# Patient Record
Sex: FEMALE | Race: WHITE | NOT HISPANIC OR LATINO | Employment: UNEMPLOYED | URBAN - METROPOLITAN AREA
[De-identification: names, ages, dates, MRNs, and addresses within clinical notes are randomized per-mention and may not be internally consistent; named-entity substitution may affect disease eponyms.]

---

## 2017-01-03 ENCOUNTER — TELEPHONE (OUTPATIENT)
Dept: OBSTETRICS AND GYNECOLOGY | Facility: CLINIC | Age: 32
End: 2017-01-03

## 2017-01-03 ENCOUNTER — ROUTINE PRENATAL (OUTPATIENT)
Dept: OBSTETRICS AND GYNECOLOGY | Facility: CLINIC | Age: 32
End: 2017-01-03
Payer: MEDICAID

## 2017-01-03 VITALS
BODY MASS INDEX: 23.12 KG/M2 | DIASTOLIC BLOOD PRESSURE: 64 MMHG | SYSTOLIC BLOOD PRESSURE: 102 MMHG | WEIGHT: 118.38 LBS

## 2017-01-03 DIAGNOSIS — Z36.89 ENCOUNTER FOR FETAL ANATOMIC SURVEY: ICD-10-CM

## 2017-01-03 DIAGNOSIS — O09.292 HISTORY OF INCOMPETENT CERVIX, CURRENTLY PREGNANT IN SECOND TRIMESTER: ICD-10-CM

## 2017-01-03 DIAGNOSIS — O34.32 CERVICAL CERCLAGE SUTURE PRESENT IN SECOND TRIMESTER: ICD-10-CM

## 2017-01-03 DIAGNOSIS — Z3A.18 18 WEEKS GESTATION OF PREGNANCY: Primary | ICD-10-CM

## 2017-01-03 PROCEDURE — 99213 OFFICE O/P EST LOW 20 MIN: CPT | Mod: TH,S$PBB,, | Performed by: OBSTETRICS & GYNECOLOGY

## 2017-01-03 PROCEDURE — 96372 THER/PROPH/DIAG INJ SC/IM: CPT | Mod: PBBFAC,PN

## 2017-01-03 PROCEDURE — 99212 OFFICE O/P EST SF 10 MIN: CPT | Mod: PBBFAC,PN,25 | Performed by: OBSTETRICS & GYNECOLOGY

## 2017-01-03 PROCEDURE — 99999 PR PBB SHADOW E&M-EST. PATIENT-LVL II: CPT | Mod: PBBFAC,,, | Performed by: OBSTETRICS & GYNECOLOGY

## 2017-01-03 RX ORDER — HYDROXYPROGESTERONE CAPROATE 250 MG/ML
250 INJECTION INTRAMUSCULAR
Status: DISCONTINUED | OUTPATIENT
Start: 2017-01-03 | End: 2017-01-10

## 2017-01-03 RX ORDER — HYDROXYPROGESTERONE CAPROATE 250 MG/ML
250 INJECTION INTRAMUSCULAR
Status: DISCONTINUED | OUTPATIENT
Start: 2017-01-04 | End: 2017-01-03

## 2017-01-03 RX ADMIN — HYDROXYPROGESTERONE CAPROATE 250 MG: 250 INJECTION INTRAMUSCULAR at 04:01

## 2017-01-03 NOTE — PROGRESS NOTES
Patient states feeling well.  7# weight gain noted.  No cramping, spotting or lof.  Continue weekly Neisha 250 mg IM.  For injection today and nurse visit next week.  RTC 2 wks for OV and anatomical survey.  Aware Quad screen was negative.

## 2017-01-03 NOTE — MR AVS SNAPSHOT
[facility]  [address]  [place] [address]  Phone: [phone]                  Ebony Rodriguez   1/3/2017 3:00 PM   Routine Prenatal    Description:  Female : 1985   Provider:  Radha Romero CNM   Department:  [facility]           Reason for Visit     Routine Prenatal Visit           Diagnoses this Visit        Comments    18 weeks gestation of pregnancy    -  Primary     Cervical cerclage suture present in second trimester         History of incompetent cervix, currently pregnant in second trimester         Encounter for fetal anatomic survey                To Do List           Future Appointments        Provider Department Dept Phone    1/10/2017 2:30 PM OB GYN NURSE, [facility] Gynecology [phone]    2017 2:30 PM ULTRA SOUND, OB-GYN-[facility] [phone]    2017 3:00 PM Radha Romero CNM [facility] Gynecology [phone]      Goals (5 Years of Data)     None      Follow-Up and Disposition     Return in about 2 weeks (around 2017).      [facility]     [facility] On Call Nurse [facility] -  Assistance  Registered nurses in the [facility] On Call Center provide clinical advisement, health education, appointment booking, and other advisory services.  Call for this free service at [phone].             Medications           Message regarding Medications     Verify the changes and/or additions to your medication regime listed below are the same as discussed with your clinician today.  If any of these changes or additions are incorrect, please notify your healthcare provider.             Verify that the below list of medications is an accurate representation of the medications you are currently taking.  If none reported, the list may be blank. If incorrect, please contact your healthcare provider. Carry this list with you in case of  emergency.           Current Medications     PRENATAL VIT/IRON FUMARATE/FA (PRENATAL 1+1 ORAL) Take by mouth.           Clinical Reference Information           Prenatal Vitals     Enc. Date GA Prenatal Vitals Prenatal Pulse Pain Level Urine Albumin/Glucose Edema Presentation Dilation/Effacement/Station    1/3/17 18w5d 102/64 / 53.7 kg (118 lb 6.2 oz) 18 cm / 145 / Present  0 Negative / Negative       16 16w5d 100/42 (A) / 50.7 kg (111 lb 12.4 oz)  / 133 u/s  / Absent  0        16 15w5d 108/72 / 51.5 kg (113 lb 8.6 oz)           16 14w5d 97/66 / 50.8 kg (111 lb 15.9 oz)  / 147 / Absent  0 Negative / Negative       16 14w0d 100/60 / 50.8 kg (111 lb 15.9 oz)  / 144 / Absent  0  None / None / None / No      16 13w5d 112/72 / 50.7 kg (111 lb 12.4 oz)  / 154 / Absent  0 Negative / Negative       16 13w1d 98/68 / 51 kg (112 lb 7 oz) 13 cm / 150 / Absent  0 2+ / Negative None / None / None / No         TW.7 kg (5 lb 15.2 oz)   Pregravid weight: 51 kg (112 lb 7 oz)   Number of fetuses: 1   Height: 5' (1.524 m)   BMI: 22.0       Vital Signs - Last Recorded  Most recent update: 1/3/2017  3:35 PM by Paula Jenkins MA    BP Wt LMP BMI       102/64 53.7 kg (118 lb 6.2 oz) 2016 23.12 kg/m2       Allergies as of 1/3/2017     No Known Allergies      Immunizations Administered on Date of Encounter - 1/3/2017     None      Orders Placed During Today's Visit     Future Labs/Procedures Expected by Expires    US OB/GYN Procedure ([facility])  As directed 1/3/2018      [facility] Sign-Up     Activating your [facility] account is as easy as 1-2-3!     1) Visit [email or web address], select Sign Up Now, enter this activation code and your date of birth, then select Next.  [email or web address]  Expires: 2017  4:37 PM      2) Create a username and password to use when you visit [facility] in the future and select a security question in case you lose your password and select Next.    3) Enter your e-mail  address and click Sign Up!    Additional Information  If you have questions, please e-mail [email or web address] or call [phone] to talk to our [facility] staff. Remember, [facility] is NOT to be used for urgent needs. For medical emergencies, dial 911.

## 2017-01-08 ENCOUNTER — HOSPITAL ENCOUNTER (EMERGENCY)
Facility: HOSPITAL | Age: 32
Discharge: HOME | End: 2017-01-08
Payer: MEDICAID

## 2017-01-08 VITALS
HEIGHT: 60 IN | DIASTOLIC BLOOD PRESSURE: 65 MMHG | TEMPERATURE: 98 F | BODY MASS INDEX: 22.38 KG/M2 | HEART RATE: 66 BPM | RESPIRATION RATE: 16 BRPM | SYSTOLIC BLOOD PRESSURE: 95 MMHG | WEIGHT: 114 LBS | OXYGEN SATURATION: 100 %

## 2017-01-08 DIAGNOSIS — O21.9 NAUSEA/VOMITING IN PREGNANCY: Primary | ICD-10-CM

## 2017-01-08 DIAGNOSIS — R20.2 PARESTHESIAS: ICD-10-CM

## 2017-01-08 DIAGNOSIS — N30.00 ACUTE CYSTITIS WITHOUT HEMATURIA: ICD-10-CM

## 2017-01-08 LAB
ALBUMIN SERPL BCP-MCNC: 3.3 G/DL
ALP SERPL-CCNC: 38 U/L
ALT SERPL W/O P-5'-P-CCNC: 5 U/L
ANION GAP SERPL CALC-SCNC: 11 MMOL/L
AST SERPL-CCNC: 21 U/L
BACTERIA #/AREA URNS HPF: ABNORMAL /HPF
BASOPHILS # BLD AUTO: 0 K/UL
BASOPHILS NFR BLD: 0 %
BILIRUB SERPL-MCNC: 0.6 MG/DL
BILIRUB UR QL STRIP: NEGATIVE
BUN SERPL-MCNC: 6 MG/DL
CALCIUM SERPL-MCNC: 8.6 MG/DL
CHLORIDE SERPL-SCNC: 104 MMOL/L
CLARITY UR: CLEAR
CO2 SERPL-SCNC: 21 MMOL/L
COLOR UR: YELLOW
CREAT SERPL-MCNC: 0.6 MG/DL
DIFFERENTIAL METHOD: ABNORMAL
EOSINOPHIL # BLD AUTO: 0 K/UL
EOSINOPHIL NFR BLD: 0.5 %
ERYTHROCYTE [DISTWIDTH] IN BLOOD BY AUTOMATED COUNT: 14 %
EST. GFR  (AFRICAN AMERICAN): >60 ML/MIN/1.73 M^2
EST. GFR  (NON AFRICAN AMERICAN): >60 ML/MIN/1.73 M^2
GLUCOSE SERPL-MCNC: 96 MG/DL
GLUCOSE UR QL STRIP: NEGATIVE
HCT VFR BLD AUTO: 35.2 %
HGB BLD-MCNC: 12.8 G/DL
HGB UR QL STRIP: NEGATIVE
KETONES UR QL STRIP: NEGATIVE
LEUKOCYTE ESTERASE UR QL STRIP: ABNORMAL
LYMPHOCYTES # BLD AUTO: 0.8 K/UL
LYMPHOCYTES NFR BLD: 12.7 %
MCH RBC QN AUTO: 32.7 PG
MCHC RBC AUTO-ENTMCNC: 36.4 %
MCV RBC AUTO: 90 FL
MICROSCOPIC COMMENT: ABNORMAL
MONOCYTES # BLD AUTO: 0.3 K/UL
MONOCYTES NFR BLD: 4.8 %
NEUTROPHILS # BLD AUTO: 5 K/UL
NEUTROPHILS NFR BLD: 82.3 %
NITRITE UR QL STRIP: NEGATIVE
PH UR STRIP: 6 [PH] (ref 5–8)
PLATELET # BLD AUTO: 135 K/UL
PMV BLD AUTO: 9.6 FL
POTASSIUM SERPL-SCNC: 4.2 MMOL/L
PROT SERPL-MCNC: 6.5 G/DL
PROT UR QL STRIP: NEGATIVE
RBC # BLD AUTO: 3.92 M/UL
RBC #/AREA URNS HPF: 0 /HPF (ref 0–4)
SODIUM SERPL-SCNC: 136 MMOL/L
SP GR UR STRIP: 1.02 (ref 1–1.03)
SQUAMOUS #/AREA URNS HPF: 20 /HPF
URN SPEC COLLECT METH UR: ABNORMAL
UROBILINOGEN UR STRIP-ACNC: ABNORMAL EU/DL
WBC # BLD AUTO: 6.06 K/UL
WBC #/AREA URNS HPF: 20 /HPF (ref 0–5)

## 2017-01-08 PROCEDURE — 96361 HYDRATE IV INFUSION ADD-ON: CPT

## 2017-01-08 PROCEDURE — 63600175 PHARM REV CODE 636 W HCPCS: Performed by: NURSE PRACTITIONER

## 2017-01-08 PROCEDURE — 96374 THER/PROPH/DIAG INJ IV PUSH: CPT

## 2017-01-08 PROCEDURE — 81000 URINALYSIS NONAUTO W/SCOPE: CPT

## 2017-01-08 PROCEDURE — 87088 URINE BACTERIA CULTURE: CPT

## 2017-01-08 PROCEDURE — 85025 COMPLETE CBC W/AUTO DIFF WBC: CPT

## 2017-01-08 PROCEDURE — 99283 EMERGENCY DEPT VISIT LOW MDM: CPT | Mod: 25

## 2017-01-08 PROCEDURE — 87186 SC STD MICRODIL/AGAR DIL: CPT

## 2017-01-08 PROCEDURE — 80053 COMPREHEN METABOLIC PANEL: CPT

## 2017-01-08 PROCEDURE — 25000003 PHARM REV CODE 250: Performed by: NURSE PRACTITIONER

## 2017-01-08 PROCEDURE — 87077 CULTURE AEROBIC IDENTIFY: CPT

## 2017-01-08 PROCEDURE — 87086 URINE CULTURE/COLONY COUNT: CPT

## 2017-01-08 RX ORDER — METOCLOPRAMIDE HYDROCHLORIDE 5 MG/ML
10 INJECTION INTRAMUSCULAR; INTRAVENOUS
Status: COMPLETED | OUTPATIENT
Start: 2017-01-08 | End: 2017-01-08

## 2017-01-08 RX ORDER — NITROFURANTOIN 25; 75 MG/1; MG/1
100 CAPSULE ORAL 2 TIMES DAILY
Qty: 14 CAPSULE | Refills: 0 | Status: SHIPPED | OUTPATIENT
Start: 2017-01-08 | End: 2017-01-15

## 2017-01-08 RX ORDER — DOXYLAMINE SUCCINATE AND PYRIDOXINE HYDROCHLORIDE, DELAYED RELEASE TABLETS 10 MG/10 MG 10; 10 MG/1; MG/1
TABLET, DELAYED RELEASE ORAL
Qty: 20 TABLET | Refills: 0 | Status: ON HOLD | OUTPATIENT
Start: 2017-01-08 | End: 2017-05-10 | Stop reason: HOSPADM

## 2017-01-08 RX ADMIN — METOCLOPRAMIDE 10 MG: 5 INJECTION, SOLUTION INTRAMUSCULAR; INTRAVENOUS at 12:01

## 2017-01-08 RX ADMIN — SODIUM CHLORIDE 1000 ML: 0.9 INJECTION, SOLUTION INTRAVENOUS at 12:01

## 2017-01-08 NOTE — ED PROVIDER NOTES
Encounter Date: 2017       History     Chief Complaint   Patient presents with    Nausea     Patient c/o nausea and lightheaded, patient states she is 19 weeeks      Review of patient's allergies indicates:  No Known Allergies  HPI Comments:  19w 0d 31 year old female presents with complaint of nausea/ vomiting x 2 yesterday associated with numbness, paresthesias and fingers that began gradually yesterday. Symptoms are intermittent and moderate. Pt reports she is nervous. No pain to describe. No radiation. Pt reported episodic lightheadedness earlier today but has no symptoms now. No fever. No pelvic pain, dysuria, urinary frequency, no abdominal pain, no vaginal dc or bleeding.    The history is provided by the patient.     Past Medical History   Diagnosis Date    Anemia     H/O cervical cerclage, currently pregnant      No past medical history pertinent negatives.  Past Surgical History   Procedure Laterality Date    Cervical cerclage       Family History   Problem Relation Age of Onset    Heart failure Father     Diabetes Mother      Social History   Substance Use Topics    Smoking status: Never Smoker    Smokeless tobacco: None    Alcohol use No     Review of Systems   Constitutional: Negative.  Negative for chills and fever.   HENT: Negative.  Negative for congestion and rhinorrhea.    Eyes: Negative.    Respiratory: Negative.  Negative for chest tightness, shortness of breath and wheezing.    Cardiovascular: Negative.  Negative for chest pain and palpitations.   Gastrointestinal: Positive for nausea and vomiting. Negative for abdominal pain.   Endocrine: Negative.    Genitourinary: Negative.    Musculoskeletal: Negative.  Negative for back pain and myalgias.   Skin: Negative.  Negative for pallor and rash.   Allergic/Immunologic: Negative.    Neurological: Negative.  Negative for weakness and headaches.   Hematological: Negative.    Psychiatric/Behavioral: The patient is nervous/anxious.     All other systems reviewed and are negative.      Physical Exam   Initial Vitals   BP Pulse Resp Temp SpO2   01/08/17 1106 01/08/17 1106 01/08/17 1106 01/08/17 1106 01/08/17 1106   109/68 90 18 97.6 °F (36.4 °C) 100 %     Physical Exam    Nursing note and vitals reviewed.  Constitutional: She appears well-developed and well-nourished.   HENT:   Head: Normocephalic and atraumatic.   Nose: Nose normal.   Mouth/Throat: Oropharynx is clear and moist.   Eyes: Conjunctivae and EOM are normal.   Neck: Normal range of motion. Neck supple.   Cardiovascular: Normal rate, regular rhythm and normal heart sounds.   No murmur heard.  Pulmonary/Chest: Breath sounds normal. No respiratory distress. She has no wheezes. She has no rhonchi. She has no rales. She exhibits no tenderness.   Abdominal: Soft. Bowel sounds are normal. She exhibits no distension. There is no tenderness. There is no rebound and no guarding.   Musculoskeletal: Normal range of motion.   Neurological: She is alert and oriented to person, place, and time. She has normal strength. No sensory deficit.   Skin: Skin is warm and dry.   Psychiatric: She has a normal mood and affect. Thought content normal.         ED Course   Procedures  Labs Reviewed   URINALYSIS - Abnormal; Notable for the following:        Result Value    Leukocytes, UA Trace (*)     All other components within normal limits   CULTURE, URINE   CBC W/ AUTO DIFFERENTIAL   COMPREHENSIVE METABOLIC PANEL   URINALYSIS MICROSCOPIC               Results for orders placed or performed during the hospital encounter of 01/08/17   Urinalysis   Result Value Ref Range    Specimen UA Urine, Clean Catch     Color, UA Yellow Yellow, Straw, Ester    Appearance, UA Clear Clear    Specific Gravity, UA 1.025 1.005 - 1.030    Protein, UA Negative Negative    Glucose, UA Negative Negative    Ketones, UA Negative Negative    Bilirubin (UA) Negative Negative    Occult Blood UA Negative Negative    Nitrite, UA Negative  Negative    Leukocytes, UA Trace (A) Negative         The patient is resting comfortably and is in no acute distress. Pt reports relief and no symptoms of n/v, paresthesias,lightheadedness, or abdominal pain. I have informed pt that a urine culture will be completed to determine the results of the urinalysis. There was high amount of sq cells noted in addition to bacteria, and wbc on hpf. I will treat with abx while culture is being performed. she states that her symptoms have improved after treatment within ER. Discussed test results, shared treatment plan, specific conditions for return, and importance of follow up with the appropriate physician with patient. she understands and agrees with the plan as discussed. Answered her questions at this time. she has remained hemodynamically stable throughout the ED course and is appropriate for discharge home.   Medical Decision Making:   Clinical Tests:   Lab Tests: Ordered and Reviewed  The following lab test(s) were unremarkable: CBC, CMP and Urinalysis       <> Summary of Lab: Unremarkable.   Medical Tests: Ordered and Reviewed      I discussed with patient and/or family/caretaker that evaluation in the ED does not suggest any emergent or life threatening medical conditions requiring immediate intervention beyond what was provided in the ED, and I believe patient is safe for discharge.  Regardless, an unremarkable evaluation in the ED does not preclude the development or presence of a serious of life threatening condition. As such, patient was instructed to return immediately for any worsening or change in current symptoms.               ED Course     Clinical Impression:   The primary encounter diagnosis was Nausea/vomiting in pregnancy. Diagnoses of Paresthesias and Acute cystitis without hematuria were also pertinent to this visit.          Brennon Pryor Jr., [facility]  01/08/17 1697

## 2017-01-08 NOTE — ED NOTES
Pt sitting in chair in NAD on cell phone, awaiting fluids to complete infusion, asking to go home, will evaluated when fluids complete. No current needs at this time

## 2017-01-08 NOTE — ED AVS SNAPSHOT
[facility]  [address] [address]               Ebony Rodriguez   2017 11:10 AM   ED    Description:  Female : 1985   Department:  [facility]           Your Care was Coordinated By:     Provider Role From To    VIPIN Hazel Jr. Nurse Practitioner 17 1110 --      Reason for Visit     Nausea           Diagnoses this Visit        Comments    Nausea/vomiting in pregnancy    -  Primary     Paresthesias         Acute cystitis without hematuria           ED Disposition     None           To Do List           Follow-up Information     [facility]. Schedule an appointment as soon as possible for a visit in 2 days.    Specialty:  Obstetrics and Gynecology    Why:  If symptoms worsen return to ED    Contact information:    [address] [address] [phone]    Additional information:    (off [address]) 4th floor, Please check in for your appointment in the Women's Services Department located through the doorway to the left of the elevators.       These Medications        Disp Refills Start End    nitrofurantoin, macrocrystal-monohydrate, (MACROBID) 100 MG capsule 14 capsule 0 2017 1/15/2017    Take 1 capsule (100 mg total) by mouth 2 (two) times daily. - Oral    Pharmacy: [facility] [address] [place] [address] MAIN  AT [facility] [address] Ph #: [phone]       doxylamine-pyridoxine (DICLEGIS) 10-10 mg TbEC 20 tablet 0 2017     Take as directed.    Pharmacy: [facility] [address] [place] [address] MAIN  AT [facility] [address] Ph #: [phone]         [facility]     [facility] Care [facility] -  Assistance  Registered nurses in the [facility] Center provide clinical advisement, health education, appointment booking, and other advisory services.  Call for this free service at [phone].             Medications            Message regarding Medications     Verify the changes and/or additions to your medication regime listed below are the same as discussed with your clinician today.  If any of these changes or additions are incorrect, please notify your healthcare provider.        START taking these NEW medications        Refills    nitrofurantoin, macrocrystal-monohydrate, (MACROBID) 100 MG capsule 0    Sig: Take 1 capsule (100 mg total) by mouth 2 (two) times daily.    Class: Print    Route: Oral    doxylamine-pyridoxine (DICLEGIS) 10-10 mg TbEC 0    Sig: Take as directed.    Class: Print      These medications were administered today        Dose Freq    metoclopramide HCl injection 10 mg 10 mg ED 1 Time    Sig: Inject 2 mLs (10 mg total) into the vein ED 1 Time.    Class: Normal    Route: Intravenous    sodium chloride 0.9% bolus 1,000 mL 1,000 mL ED 1 Time    Sig: Inject 1,000 mLs into the vein ED 1 Time.    Class: Normal    Route: Intravenous           Verify that the below list of medications is an accurate representation of the medications you are currently taking.  If none reported, the list may be blank. If incorrect, please contact your healthcare provider. Carry this list with you in case of emergency.           Current Medications     doxylamine-pyridoxine (DICLEGIS) 10-10 mg TbEC Take as directed.    JUNIE Oil 250 mg Inject 1 mL (250 mg total) into the muscle every 7 days.    JUNIE Oil 250 mg Inject 1 mL (250 mg total) into the muscle every 7 days.    JUNIE Oil 250 mg Inject 1 mL (250 mg total) into the muscle every 7 days.    nitrofurantoin, macrocrystal-monohydrate, (MACROBID) 100 MG capsule Take 1 capsule (100 mg total) by mouth 2 (two) times daily.    PRENATAL VIT/IRON FUMARATE/FA (PRENATAL 1+1 ORAL) Take by mouth.           Clinical Reference Information           Your Vitals Were     BP Pulse Temp Resp Height Weight    95/65 (BP Location: Left arm, Patient Position: Sitting, BP Method: Automatic) 66 97.6 °F  (36.4 °C) (Oral) 16 5' (1.524 m) 51.7 kg (114 lb)    Last Period SpO2 BMI          09/08/2016 100% 22.26 kg/m2        Allergies as of 1/8/2017     No Known Allergies      Immunizations Administered on Date of Encounter - 1/8/2017     None      ED Micro, Lab, POCT     Start Ordered       Status Ordering Provider    01/08/17 1422 01/08/17 1421  Urine culture **CANNOT BE ORDERED STAT**  Add-on      Completed     01/08/17 1134 01/08/17 1133  CBC auto differential  STAT      Collected     01/08/17 1134 01/08/17 1133  Comprehensive metabolic panel  STAT      Collected     01/08/17 1134 01/08/17 1133  Urinalysis  STAT      Collected       ED Imaging Orders     None      Discharge References/Attachments     PARAESTHESIAS (ENGLISH)      Your Scheduled Appointments     Facundo 10, 2017  2:30 PM CST   Nurse Visit with OB GYN NURSE, [facility] ([place])    [address] [place] [id number] [phone]            Jan 17, 2017  2:30 PM CST   Ultrasound with ULTRA SOUND, OB-GYN-[facility]ne[facility] ([place])    [address] [place] [address] [phone]            Jan 17, 2017  3:00 PM CST   Routine Prenatal Visit with Radha Romero CNM   [facility] ([place])    [address] [place] [address]   [phone]              [facility] Sign-Up     Activating your [facility] account is as easy as 1-2-3!     1) Visit [email or web address], select Sign Up Now, enter this activation code and your date of birth, then select Next.  [email or web address]  Expires: 1/27/2017  4:37 PM      2) Create a username and password to use when you visit [facility] in the future and select a security question in case you lose your password and select Next.    3) Enter your e-mail address and click Sign Up!    Additional Information  If you have questions, please e-mail [email or web address] or call [phone] to talk to our [facility]  staff. Remember, [facility] is NOT to be used for urgent needs. For medical emergencies, dial 911.          [facility] complies with applicable Federal civil rights laws and does not discriminate on the basis of race, color, national origin, age, disability, or sex.        Language Assistance Services     ATTENTION: Language assistance services are available, free of charge. Please call [phone].      ATENCIÓN: Si habla español, tiene a diallo disposición servicios gratuitos de asistencia lingüística. Llame al [phone].     CHÚ Ý: N?u b?n nói Ti?ng Vi?t, có các d?ch v? h? tr? ngôn ng? mi?n phí dành cho b?n. G?i s? [phone].

## 2017-01-10 ENCOUNTER — CLINICAL SUPPORT (OUTPATIENT)
Dept: OBSTETRICS AND GYNECOLOGY | Facility: CLINIC | Age: 32
End: 2017-01-10
Payer: MEDICAID

## 2017-01-10 DIAGNOSIS — O34.32 CERVICAL CERCLAGE SUTURE PRESENT IN SECOND TRIMESTER: ICD-10-CM

## 2017-01-10 PROCEDURE — 96372 THER/PROPH/DIAG INJ SC/IM: CPT | Mod: PBBFAC,PN

## 2017-01-10 RX ORDER — HYDROXYPROGESTERONE CAPROATE 250 MG/ML
250 INJECTION INTRAMUSCULAR
Status: DISCONTINUED | OUTPATIENT
Start: 2017-01-10 | End: 2017-03-07

## 2017-01-10 RX ADMIN — HYDROXYPROGESTERONE CAPROATE 250 MG: 250 INJECTION INTRAMUSCULAR at 03:01

## 2017-01-10 NOTE — PROGRESS NOTES
ID pt with name and , verified allergies.  Per MAR gave josh oil IM to left ventrogluteal. Pt tolerated well. Advised to remain in clinic 15 minutes,  Next dose due in 7 days, scheduled accordingly.  JACQUIE Moeller

## 2017-01-11 LAB — BACTERIA UR CULT: NORMAL

## 2017-01-12 ENCOUNTER — TELEPHONE (OUTPATIENT)
Dept: EMERGENCY MEDICINE | Facility: HOSPITAL | Age: 32
End: 2017-01-12

## 2017-01-12 NOTE — TELEPHONE ENCOUNTER
----- Message from Robbie Huston MD sent at 1/11/2017 11:31 AM CST -----  Patient with positive urine culture, please call in bactrim DS bid for 7 days.  Thanks

## 2017-01-17 ENCOUNTER — PROCEDURE VISIT (OUTPATIENT)
Dept: OBSTETRICS AND GYNECOLOGY | Facility: CLINIC | Age: 32
End: 2017-01-17
Payer: MEDICAID

## 2017-01-17 ENCOUNTER — ROUTINE PRENATAL (OUTPATIENT)
Dept: OBSTETRICS AND GYNECOLOGY | Facility: CLINIC | Age: 32
End: 2017-01-17
Payer: MEDICAID

## 2017-01-17 VITALS
SYSTOLIC BLOOD PRESSURE: 102 MMHG | WEIGHT: 117.06 LBS | DIASTOLIC BLOOD PRESSURE: 70 MMHG | BODY MASS INDEX: 22.86 KG/M2

## 2017-01-17 DIAGNOSIS — Z36.89 ENCOUNTER FOR FETAL ANATOMIC SURVEY: ICD-10-CM

## 2017-01-17 DIAGNOSIS — Z3A.20 20 WEEKS GESTATION OF PREGNANCY: Primary | ICD-10-CM

## 2017-01-17 DIAGNOSIS — N88.3 INCOMPETENT CERVIX: ICD-10-CM

## 2017-01-17 DIAGNOSIS — O34.32 CERVICAL CERCLAGE SUTURE PRESENT IN SECOND TRIMESTER: ICD-10-CM

## 2017-01-17 PROCEDURE — 99212 OFFICE O/P EST SF 10 MIN: CPT | Mod: PBBFAC,PN | Performed by: OBSTETRICS & GYNECOLOGY

## 2017-01-17 PROCEDURE — 99213 OFFICE O/P EST LOW 20 MIN: CPT | Mod: TH,S$PBB,, | Performed by: OBSTETRICS & GYNECOLOGY

## 2017-01-17 PROCEDURE — 96372 THER/PROPH/DIAG INJ SC/IM: CPT | Mod: PBBFAC,PN

## 2017-01-17 PROCEDURE — 99999 PR PBB SHADOW E&M-EST. PATIENT-LVL II: CPT | Mod: PBBFAC,,, | Performed by: OBSTETRICS & GYNECOLOGY

## 2017-01-17 PROCEDURE — 76805 OB US >/= 14 WKS SNGL FETUS: CPT | Mod: 26,S$PBB,, | Performed by: OBSTETRICS & GYNECOLOGY

## 2017-01-17 RX ORDER — HYDROXYPROGESTERONE CAPROATE 250 MG/ML
250 INJECTION INTRAMUSCULAR
Status: DISCONTINUED | OUTPATIENT
Start: 2017-01-18 | End: 2017-03-07

## 2017-01-17 RX ADMIN — HYDROXYPROGESTERONE CAPROATE 250 MG: 250 INJECTION INTRAMUSCULAR at 04:01

## 2017-01-17 NOTE — PROGRESS NOTES
Anatomy scan today with good growth, 38%tile, cephalic, kenia normal, 3V, post. Placenta. Male.  Sub-opt brain. Rpt 4 wks.  Continue weekly Neisha 250 mg/oil IM.  Denies any cramping, bleeding, spotting.  Notes much quickening.   Lost 1/2#.  4 # total  Watch wt gain and growth.  No complaints today.  QUAD screen was NEG and patient aware.   RTC weekly [place] and 2 wks ov.

## 2017-01-17 NOTE — MR AVS SNAPSHOT
[facility]  [address]  [place] [address]  Phone: [phone]                  Ebony Rodriguez   2017 3:00 PM   Routine Prenatal    Description:  Female : 1985   Provider:  Radha Romero CNM   Department:  [facility]           Reason for Visit     Routine Prenatal Visit           Diagnoses this Visit        Comments    20 weeks gestation of pregnancy    -  Primary     Incompetent cervix         Cervical cerclage suture present in second trimester         Encounter for fetal anatomic survey                To Do List           Future Appointments        Provider Department Dept Phone    2017 10:00 AM OB GYN NURSE, [facility] Gyne[facility] [phone]    2017 3:45 PM Radha Romero CNM [facility] Gynecology [phone]      Goals (5 Years of Data)     None      Follow-Up and Disposition     Return in about 2 weeks (around 2017).      [facility]     [facility] On Call Nurse [facility]  Assistance  Registered nurses in the [facility] provide clinical advisement, health education, appointment booking, and other advisory services.  Call for this free service at [phone].             Medications           Message regarding Medications     Verify the changes and/or additions to your medication regime listed below are the same as discussed with your clinician today.  If any of these changes or additions are incorrect, please notify your healthcare provider.        These medications were administered today        Dose Freq    JUNIE Oil 250 mg 250 mg Every 7 days    Starting on: 2017    Sig: Inject 1 mL (250 mg total) into the muscle every 7 days.    Class: Normal    Route: Intramuscular    Non-formulary Exception Code: Defer to pharmacy           Verify that the below list of medications is an accurate representation of the medications you are currently  taking.  If none reported, the list may be blank. If incorrect, please contact your healthcare provider. Carry this list with you in case of emergency.           Current Medications     doxylamine-pyridoxine (DICLEGIS) 10-10 mg TbEC Take as directed.    PRENATAL VIT/IRON FUMARATE/FA (PRENATAL 1+1 ORAL) Take by mouth.           Clinical Reference Information           Prenatal Vitals     Enc. Date GA Prenatal Vitals Prenatal Pulse Pain Level Urine Albumin/Glucose Edema Presentation Dilation/Effacement/Station    17 20w5d 102/70 / 53.1 kg (117 lb 1 oz) 20 cm / us 163 / Present  0 Negative / Negative None / None / None / No      1/3/17 18w5d 102/64 / 53.7 kg (118 lb 6.2 oz) 18 cm / 145 / Present  0 Negative / Negative None / None / None / No      16 16w5d 100/42 (A) / 50.7 kg (111 lb 12.4 oz)  / 133 u/s  / Absent  0        16 15w5d 108/72 / 51.5 kg (113 lb 8.6 oz)           16 14w5d 97/66 / 50.8 kg (111 lb 15.9 oz)  / 147 / Absent  0 Negative / Negative       16 14w0d 100/60 / 50.8 kg (111 lb 15.9 oz)  / 144 / Absent  0  None / None / None / No      16 13w5d 112/72 / 50.7 kg (111 lb 12.4 oz)  / 154 / Absent  0 Negative / Negative       16 13w1d 98/68 / 51 kg (112 lb 7 oz) 13 cm / 150 / Absent  0 2+ / Negative None / None / None / No         TW.1 kg (4 lb 10.1 oz)   Pregravid weight: 51 kg (112 lb 7 oz)   Number of fetuses: 1   Height: 5' (1.524 m)   BMI: 22.0       Vital Signs - Last Recorded  Most recent update: 2017  3:41 PM by Paula Jenkins MA    BP Wt LMP BMI       102/70 53.1 kg (117 lb 1 oz) 2016 22.86 kg/m2       Allergies as of 2017     No Known Allergies      Immunizations Administered on Date of Encounter - 2017     None      Administrations This Visit     JUNIE Oil 250 mg     Admin Date Action Dose Route Administered By             2017 Given 250 mg Intramuscular Princess Lau LPN                      [facility] Sign-Up     Activating  your [facility] account is as easy as 1-2-3!     1) Visit [email or web address], select Sign Up Now, enter this activation code and your date of birth, then select Next.  [email or web address]  Expires: 1/27/2017  4:37 PM      2) Create a username and password to use when you visit [facility] in the future and select a security question in case you lose your password and select Next.    3) Enter your e-mail address and click Sign Up!    Additional Information  If you have questions, please e-mail [email or web address] or call [phone] to talk to our [facility] staff. Remember, [facility] is NOT to be used for urgent needs. For medical emergencies, dial 911.

## 2017-01-24 ENCOUNTER — CLINICAL SUPPORT (OUTPATIENT)
Dept: OBSTETRICS AND GYNECOLOGY | Facility: CLINIC | Age: 32
End: 2017-01-24
Payer: MEDICAID

## 2017-01-24 DIAGNOSIS — O34.32 INCOMPETENT CERVIX IN PREGNANCY, SECOND TRIMESTER: Primary | ICD-10-CM

## 2017-01-24 PROCEDURE — 96372 THER/PROPH/DIAG INJ SC/IM: CPT | Mod: PBBFAC,PN

## 2017-01-24 RX ORDER — HYDROXYPROGESTERONE CAPROATE 250 MG/ML
250 INJECTION INTRAMUSCULAR
Status: COMPLETED | OUTPATIENT
Start: 2017-01-24 | End: 2017-01-24

## 2017-01-24 RX ADMIN — HYDROXYPROGESTERONE CAPROATE 250 MG: 250 INJECTION INTRAMUSCULAR at 10:01

## 2017-01-24 NOTE — PROGRESS NOTES
ID pt with name and , verified allergies, per MAR gave josh oil IM to left ventrogluteal.  PT tolerated well. Advised to remain in clinic 15 minutes. Next dose due in 7 days.  Scheduled accordingly.  JACQUIE Moeller

## 2017-01-24 NOTE — MR AVS SNAPSHOT
[facility] Obstetrics and Gynecology  [address]  [place] [address]  Phone: [phone]                  Ebony Rodriguez   2017 10:00 AM   Clinical Support    Description:  Female : 1985   Provider:  OB GYN NURSESHAYLEE   Department:  [facility] Obstetrics and Gynecology           Diagnoses this Visit        Comments    Incompetent cervix in pregnancy, second trimester    -  Primary            To Do List           Future Appointments        Provider Department Dept Phone    2017 3:45 PM Radha Romero CNM [facility] Obstetrics and Gynecology [phone]      Goals (5 Years of Data)     None      [facility]     [facility] On [facility] Nurse [facility] -  Assistance  Registered nurses in the [facility] On Call Center provide clinical advisement, health education, appointment booking, and other advisory services.  Call for this free service at [phone].             Medications           Message regarding Medications     Verify the changes and/or additions to your medication regime listed below are the same as discussed with your clinician today.  If any of these changes or additions are incorrect, please notify your healthcare provider.        These medications were administered today        Dose Freq    hydroxyprogesterone caproate ([place]) injection 250 mg 250 mg Clinic/HOD 1 time    Sig: Inject 250 mg into the muscle one time.    Class: Normal    Route: Intramuscular           Verify that the below list of medications is an accurate representation of the medications you are currently taking.  If none reported, the list may be blank. If incorrect, please contact your healthcare provider. Carry this list with you in case of emergency.           Current Medications     doxylamine-pyridoxine (DICLEGIS) 10-10 mg TbEC Take as directed.    PRENATAL VIT/IRON FUMARATE/FA (PRENATAL 1+1 ORAL) Take by mouth.           Clinical Reference Information           Prenatal Vitals     Enc.  Date GA Prenatal Vitals Prenatal Pulse Pain Level Urine Albumin/Glucose Edema Presentation Dilation/Effacement/Station    17 20w5d 102/70 / 53.1 kg (117 lb 1 oz) 20 cm / us 163 / Present  0 Negative / Negative None / None / None / No      1/3/17 18w5d 102/64 / 53.7 kg (118 lb 6.2 oz) 18 cm / 145 / Present  0 Negative / Negative None / None / None / No      16 16w5d 100/42 (A) / 50.7 kg (111 lb 12.4 oz)  / 133 u/s  / Absent  0        16 15w5d 108/72 / 51.5 kg (113 lb 8.6 oz)           16 14w5d 97/66 / 50.8 kg (111 lb 15.9 oz)  / 147 / Absent  0 Negative / Negative       16 14w0d 100/60 / 50.8 kg (111 lb 15.9 oz)  / 144 / Absent  0  None / None / None / No      16 13w5d 112/72 / 50.7 kg (111 lb 12.4 oz)  / 154 / Absent  0 Negative / Negative       16 13w1d 98/68 / 51 kg (112 lb 7 oz) 13 cm / 150 / Absent  0 2+ / Negative None / None / None / No         TW.1 kg (4 lb 10.1 oz)   Pregravid weight: 51 kg (112 lb 7 oz)   Number of fetuses: 1   Height: 5' (1.524 m)   BMI: 22.0       Vital Signs - Last Recorded     LMP                   2016           Allergies as of 2017     No Known Allergies      Immunizations Administered on Date of Encounter - 2017     None      Administrations This Visit     hydroxyprogesterone caproate ([place]) injection 250 mg     Admin Date Action Dose Route Administered By             2017 Given 250 mg Intramuscular Brittany D. Wallace, LPN [facility] Sign-Up     Activating your [facility] account is as easy as 1-2-3!     1) Visit [email or web address], select Sign Up Now, enter this activation code and your date of birth, then select Next.  [email or web address]  Expires: 2017  4:37 PM      2) Create a username and password to use when you visit [facility] in the future and select a security question in case you lose your password and select Next.    3) Enter your e-mail address and click Sign Up!    Additional  Information  If you have questions, please e-mail [email or web address] or call [phone] to talk to our [facility] staff. Remember, [facility] is NOT to be used for urgent needs. For medical emergencies, dial 911.

## 2017-01-31 ENCOUNTER — ROUTINE PRENATAL (OUTPATIENT)
Dept: OBSTETRICS AND GYNECOLOGY | Facility: CLINIC | Age: 32
End: 2017-01-31
Payer: MEDICAID

## 2017-01-31 VITALS
BODY MASS INDEX: 23.25 KG/M2 | WEIGHT: 119.06 LBS | DIASTOLIC BLOOD PRESSURE: 66 MMHG | SYSTOLIC BLOOD PRESSURE: 110 MMHG

## 2017-01-31 DIAGNOSIS — N88.3 INCOMPETENCY, CERVICAL: ICD-10-CM

## 2017-01-31 DIAGNOSIS — Z3A.22 22 WEEKS GESTATION OF PREGNANCY: Primary | ICD-10-CM

## 2017-01-31 DIAGNOSIS — Z36.89 ENCOUNTER FOR FETAL ANATOMIC SURVEY: ICD-10-CM

## 2017-01-31 PROCEDURE — 99213 OFFICE O/P EST LOW 20 MIN: CPT | Mod: TH,S$PBB,, | Performed by: OBSTETRICS & GYNECOLOGY

## 2017-01-31 PROCEDURE — 99212 OFFICE O/P EST SF 10 MIN: CPT | Mod: PBBFAC,PN | Performed by: OBSTETRICS & GYNECOLOGY

## 2017-01-31 PROCEDURE — 96372 THER/PROPH/DIAG INJ SC/IM: CPT | Mod: PBBFAC,PN

## 2017-01-31 PROCEDURE — 99999 PR PBB SHADOW E&M-EST. PATIENT-LVL II: CPT | Mod: PBBFAC,,, | Performed by: OBSTETRICS & GYNECOLOGY

## 2017-01-31 RX ORDER — HYDROXYPROGESTERONE CAPROATE 250 MG/ML
250 INJECTION INTRAMUSCULAR
Status: DISCONTINUED | OUTPATIENT
Start: 2017-02-01 | End: 2017-03-07

## 2017-01-31 RX ADMIN — HYDROXYPROGESTERONE CAPROATE 250 MG: 250 INJECTION INTRAMUSCULAR at 05:01

## 2017-01-31 NOTE — PROGRESS NOTES
Gained 2#. TWG 6#.  No cramping, bleeding, spotting.  Good movement noted.   Appropriate fh.  Needs US in 2 wks with vs to complete anatomical survey.  Looks and feels good.  Continue weekly Neisha 250 mg IM.  RTC 2 wks for vs and US.

## 2017-01-31 NOTE — MR AVS SNAPSHOT
[facility] Obstetrics and Gynecology  [address]  [place] [address]  Phone: [phone]                  Ebony Rodriguez   2017 3:45 PM   Routine Prenatal    Description:  Female : 1985   Provider:  Radha Romero CNM   Department:  [facility] Obstetrics and Gynecology           Reason for Visit     Routine Prenatal Visit           Diagnoses this Visit        Comments    22 weeks gestation of pregnancy    -  Primary     Incompetency, cervical         Encounter for fetal anatomic survey                To Do List           Future Appointments        Provider Department Dept Phone    2017 10:00 AM OB GYN NURSE, [facility] Obstetrics and Gynecology [phone]    2017 10:00 AM ULTRA SOUND, OB-GYN-[facility] Obstetrics and Gynecology [phone]    2017 10:45 AM Keila Lema MD [facility] Obstetrics and Gynecology [phone]      Goals (5 Years of Data)     None      Follow-Up and Disposition     Return in about 2 weeks (around 2017).    Follow-up and Disposition History      [facility]     [facility] On Call Nurse [facility] -  Assistance  Registered nurses in the [facility] On Call Center provide clinical advisement, health education, appointment booking, and other advisory services.  Call for this free service at [phone].             Medications           Message regarding Medications     Verify the changes and/or additions to your medication regime listed below are the same as discussed with your clinician today.  If any of these changes or additions are incorrect, please notify your healthcare provider.        These medications were administered today        Dose Freq    JUNIE Oil 250 mg 250 mg Every 7 days    Starting on: 2017    Sig: Inject 1 mL (250 mg total) into the muscle every 7 days.    Class: Normal    Route: Intramuscular    Non-formulary Exception Code: Treatment failure with formulary alternative           Verify that  the below list of medications is an accurate representation of the medications you are currently taking.  If none reported, the list may be blank. If incorrect, please contact your healthcare provider. Carry this list with you in case of emergency.           Current Medications     doxylamine-pyridoxine (DICLEGIS) 10-10 mg TbEC Take as directed.    PRENATAL VIT/IRON FUMARATE/FA (PRENATAL 1+1 ORAL) Take by mouth.           Clinical Reference Information           Prenatal Vitals     Enc. Date GA Prenatal Vitals Prenatal Pulse Pain Level Urine Albumin/Glucose Edema Presentation Dilation/Effacement/Station    1/31/17 22w5d 110/66 / 54 kg (119 lb 0.8 oz) 22 cm / 142 / Present  0        1/17/17 20w5d 102/70 / 53.1 kg (117 lb 1 oz) 20 cm / us 163 / Present  0 Negative / Negative None / None / None / No      1/3/17 18w5d 102/64 / 53.7 kg (118 lb 6.2 oz) 18 cm / 145 / Present  0 Negative / Negative None / None / None / No      12/20/16 16w5d 100/42 (A) / 50.7 kg (111 lb 12.4 oz)  / 133 u/s  / Absent  0        12/13/16 15w5d 108/72 / 51.5 kg (113 lb 8.6 oz)           12/6/16 14w5d 97/66 / 50.8 kg (111 lb 15.9 oz)  / 147 / Absent  0 Negative / Negative       12/1/16 14w0d 100/60 / 50.8 kg (111 lb 15.9 oz)  / 144 / Absent  0  None / None / None / No      11/29/16 13w5d 112/72 / 50.7 kg (111 lb 12.4 oz)  / 154 / Absent  0 Negative / Negative       11/25/16 13w1d 98/68 / 51 kg (112 lb 7 oz) 13 cm / 150 / Absent  0 2+ / Negative None / None / None / No         TWG: 3 kg (6 lb 9.8 oz)   Pregravid weight: 51 kg (112 lb 7 oz)   Number of fetuses: 1   Height: 5' (1.524 m)   BMI: 22.0       Vital Signs - Last Recorded  Most recent update: 1/31/2017  4:23 PM by Amparo Garcia MA    BP Wt LMP BMI       110/66 54 kg (119 lb 0.8 oz) 09/08/2016 23.25 kg/m2       Allergies as of 1/31/2017     No Known Allergies      Immunizations Administered on Date of Encounter - 1/31/2017     None      Orders Placed During Today's Visit     Future  Labs/Procedures Expected by Expires    US OB/GYN Procedure ([facility])  As directed 1/31/2018      [facility] Sign-Up     Activating your [facility] account is as easy as 1-2-3!     1) Visit [email or web address], select Sign Up Now, enter this activation code and your date of birth, then select Next.  [email or web address]  Expires: 3/17/2017  5:01 PM      2) Create a username and password to use when you visit [facility] in the future and select a security question in case you lose your password and select Next.    3) Enter your e-mail address and click Sign Up!    Additional Information  If you have questions, please e-mail [email or web address] or call [phone] to talk to our [facility] staff. Remember, [facility] is NOT to be used for urgent needs. For medical emergencies, dial 911.

## 2017-02-02 ENCOUNTER — TELEPHONE (OUTPATIENT)
Dept: OBSTETRICS AND GYNECOLOGY | Facility: CLINIC | Age: 32
End: 2017-02-02

## 2017-02-07 ENCOUNTER — TELEPHONE (OUTPATIENT)
Dept: OBSTETRICS AND GYNECOLOGY | Facility: CLINIC | Age: 32
End: 2017-02-07

## 2017-02-07 ENCOUNTER — CLINICAL SUPPORT (OUTPATIENT)
Dept: OBSTETRICS AND GYNECOLOGY | Facility: CLINIC | Age: 32
End: 2017-02-07
Payer: MEDICAID

## 2017-02-07 DIAGNOSIS — N88.3 INCOMPETENT CERVIX: Primary | ICD-10-CM

## 2017-02-07 PROCEDURE — 96372 THER/PROPH/DIAG INJ SC/IM: CPT | Mod: PBBFAC,PN

## 2017-02-07 RX ORDER — HYDROXYPROGESTERONE CAPROATE 250 MG/ML
250 INJECTION INTRAMUSCULAR
Status: COMPLETED | OUTPATIENT
Start: 2017-02-07 | End: 2017-02-07

## 2017-02-07 RX ADMIN — HYDROXYPROGESTERONE CAPROATE 250 MG: 250 INJECTION INTRAMUSCULAR at 02:02

## 2017-02-07 NOTE — PROGRESS NOTES
ID pt with name and , verified allergies. Per MAR gave josh oil  IM to right ventrogluteal.  Pt tolerated well. Advised to remain in clinic 15 minutes, next dose due in 7 days. Scheduled accordingly.  JACQUIE Moeller

## 2017-02-07 NOTE — TELEPHONE ENCOUNTER
----- Message from Eve Mckay sent at 2/7/2017  2:02 PM CST -----  Contact: [facility] [phone]  States that pt needs refill on josh. Please call back at [phone]//thank you acc

## 2017-02-13 ENCOUNTER — TELEPHONE (OUTPATIENT)
Dept: OBSTETRICS AND GYNECOLOGY | Facility: CLINIC | Age: 32
End: 2017-02-13

## 2017-02-13 NOTE — TELEPHONE ENCOUNTER
----- Message from Thomas Wetzel sent at 2/13/2017  3:48 PM CST -----  Contact: Bowen Cortes   States she is calling scheduling the delivery of her mckeyna and can be reached at [phone] option 4 //thanks/dbsolomon

## 2017-02-14 ENCOUNTER — PROCEDURE VISIT (OUTPATIENT)
Dept: OBSTETRICS AND GYNECOLOGY | Facility: CLINIC | Age: 32
End: 2017-02-14
Payer: MEDICAID

## 2017-02-14 ENCOUNTER — ROUTINE PRENATAL (OUTPATIENT)
Dept: OBSTETRICS AND GYNECOLOGY | Facility: CLINIC | Age: 32
End: 2017-02-14
Payer: MEDICAID

## 2017-02-14 VITALS
DIASTOLIC BLOOD PRESSURE: 70 MMHG | WEIGHT: 122.56 LBS | SYSTOLIC BLOOD PRESSURE: 116 MMHG | BODY MASS INDEX: 23.94 KG/M2

## 2017-02-14 DIAGNOSIS — O09.892 HISTORY OF PRETERM DELIVERY, CURRENTLY PREGNANT, SECOND TRIMESTER: Primary | ICD-10-CM

## 2017-02-14 DIAGNOSIS — Z34.82 PRENATAL CARE, SUBSEQUENT PREGNANCY, SECOND TRIMESTER: ICD-10-CM

## 2017-02-14 DIAGNOSIS — Z36.89 ENCOUNTER FOR FETAL ANATOMIC SURVEY: ICD-10-CM

## 2017-02-14 PROCEDURE — 99999 PR PBB SHADOW E&M-EST. PATIENT-LVL II: CPT | Mod: PBBFAC,,, | Performed by: OBSTETRICS & GYNECOLOGY

## 2017-02-14 PROCEDURE — 76816 OB US FOLLOW-UP PER FETUS: CPT | Mod: 26,S$PBB,, | Performed by: OBSTETRICS & GYNECOLOGY

## 2017-02-14 PROCEDURE — 99213 OFFICE O/P EST LOW 20 MIN: CPT | Mod: TH,S$PBB,, | Performed by: OBSTETRICS & GYNECOLOGY

## 2017-02-14 PROCEDURE — 99212 OFFICE O/P EST SF 10 MIN: CPT | Mod: PBBFAC,PN | Performed by: OBSTETRICS & GYNECOLOGY

## 2017-02-14 PROCEDURE — 96372 THER/PROPH/DIAG INJ SC/IM: CPT | Mod: PBBFAC,PN

## 2017-02-14 RX ORDER — HYDROXYPROGESTERONE CAPROATE 250 MG/ML
250 INJECTION INTRAMUSCULAR
Status: COMPLETED | OUTPATIENT
Start: 2017-02-14 | End: 2017-02-14

## 2017-02-14 RX ADMIN — HYDROXYPROGESTERONE CAPROATE 250 MG: 250 INJECTION INTRAMUSCULAR at 10:02

## 2017-02-14 NOTE — MR AVS SNAPSHOT
[facility] Obstetrics and Gynecology  [address]  [place] [address]  Phone: [phone]                  Ebony Rodriguez   2017 10:45 AM   Routine Prenatal    Description:  Female : 1985   Provider:  Keila Lema MD   Department:  [facility] Obstetrics and Gynecology           Reason for Visit     Routine Prenatal Visit           Diagnoses this Visit        Comments    History of  delivery, currently pregnant, second trimester    -  Primary            To Do List           Future Appointments        Provider Department Dept Phone    2017 10:00 AM OB GYN NURSE, [facility] Obstetrics and Gynecology [phone]    2017 10:30 AM Keila Lema MD [facility] Obstetrics [facility] Gynecology [phone]      Goals (5 Years of Data)     None      [facility]     [facility] -  Assistance  Registered nurses in the [facility] Center provide clinical advisement, health education, appointment booking, and other advisory services.  Call for this free service at [phone].             Medications           Message regarding Medications     Verify the changes and/or additions to your medication regime listed below are the same as discussed with your clinician today.  If any of these changes or additions are incorrect, please notify your healthcare provider.        These medications were administered today        Dose Freq    JUNIE Oil 250 mg 250 mg Clinic/HOD 1 time    Sig: Inject 1 mL (250 mg total) into the muscle one time.    Class: Normal    Route: Intramuscular    Non-formulary Exception Code: Treatment failure with formulary alternative           Verify that the below list of medications is an accurate representation of the medications you are currently taking.  If none reported, the list may be blank. If incorrect, please contact your healthcare provider. Carry this list with you in case of emergency.           Current Medications      doxylamine-pyridoxine (DICLEGIS) 10-10 mg TbEC Take as directed.    PRENATAL VIT/IRON FUMARATE/FA (PRENATAL 1+1 ORAL) Take by mouth.           Clinical Reference Information           Prenatal Vitals     Enc. Date GA Prenatal Vitals Prenatal Pulse Pain Level Urine Albumin/Glucose Edema Presentation Dilation/Effacement/Station    17 24w5d 116/70 / 55.6 kg (122 lb 9.2 oz)  /  / Present  0        17 22w5d 110/66 / 54 kg (119 lb 0.8 oz) 22 cm / 142 / Present  0        17 20w5d 102/70 / 53.1 kg (117 lb 1 oz) 20 cm / us 163 / Present  0 Negative / Negative None / None / None / No      1/3/17 18w5d 102/64 / 53.7 kg (118 lb 6.2 oz) 18 cm / 145 / Present  0 Negative / Negative None / None / None / No      16 16w5d 100/42 (A) / 50.7 kg (111 lb 12.4 oz)  / 133 u/s  / Absent  0        16 15w5d 108/72 / 51.5 kg (113 lb 8.6 oz)           16 14w5d 97/66 / 50.8 kg (111 lb 15.9 oz)  / 147 / Absent  0 Negative / Negative       16 14w0d 100/60 / 50.8 kg (111 lb 15.9 oz)  / 144 / Absent  0  None / None / None / No      16 13w5d 112/72 / 50.7 kg (111 lb 12.4 oz)  / 154 / Absent  0 Negative / Negative       16 13w1d 98/68 / 51 kg (112 lb 7 oz) 13 cm / 150 / Absent  0 2+ / Negative None / None / None / No         TW.6 kg (10 lb 2.3 oz)   Pregravid weight: 51 kg (112 lb 7 oz)   Number of fetuses: 1   Height: 5' (1.524 m)   BMI: 22.0       Your Vitals Were     BP Weight Last Period BMI       116/70 55.6 kg (122 lb 9.2 oz) 2016 23.94 kg/m2       Allergies as of 2017     No Known Allergies      Immunizations Administered on Date of Encounter - 2017     None      [facility] Sign-Up     Activating your [facility] account is as easy as 1-2-3!     1) Visit [email or web address], select Sign Up Now, enter this activation code and your date of birth, then select Next.  [email or web address]  Expires: 3/17/2017  5:01 PM      2) Create a username and password to use when you visit  [facility] in the future and select a security question in case you lose your password and select Next.    3) Enter your e-mail address and click Sign Up!    Additional Information  If you have questions, please e-mail [email or web address] or call [phone] to talk to our [facility] staff. Remember, [facility] is NOT to be used for urgent needs. For medical emergencies, dial 911.         Language Assistance Services     ATTENTION: Language assistance services are available, free of charge. Please call [phone].      ATENCIÓN: Si habla español, tiene a diallo disposición servicios gratuitos de asistencia lingüística. Llame al [phone].     CHÚ Ý: N?u b?n nói Ti?ng Vi?t, có các d?ch v? h? tr? ngôn ng? mi?n phí dành cho b?n. G?i s? [phone].         [place] [facility] Ob[facility]s [facility] Gyne[facility] complies with applicable Federal civil rights laws and does not discriminate on the basis of race, color, national origin, age, disability, or sex.

## 2017-02-14 NOTE — PROGRESS NOTES
ID pt with name and , verified allergies. Per MAR gave josh oil IM to left ventrogluteal. Pt tolerated well. Advised to remain in clinic 15 minutes.  Next dose due in 7 days, scheduled accordingly.  JACQUIE Moeller

## 2017-02-15 NOTE — PROGRESS NOTES
josh injection today  Reports no problems, denies contractions or vaginal bleeding   +fetal movment,no srom, no vag bleeding  palns glucola/hct next visit

## 2017-02-21 ENCOUNTER — LAB VISIT (OUTPATIENT)
Dept: LAB | Facility: HOSPITAL | Age: 32
End: 2017-02-21
Attending: OBSTETRICS & GYNECOLOGY
Payer: MEDICAID

## 2017-02-21 ENCOUNTER — CLINICAL SUPPORT (OUTPATIENT)
Dept: OBSTETRICS AND GYNECOLOGY | Facility: CLINIC | Age: 32
End: 2017-02-21
Payer: MEDICAID

## 2017-02-21 DIAGNOSIS — Z34.82 PRENATAL CARE, SUBSEQUENT PREGNANCY, SECOND TRIMESTER: ICD-10-CM

## 2017-02-21 DIAGNOSIS — O34.32 CERVICAL CERCLAGE SUTURE PRESENT IN SECOND TRIMESTER: Primary | ICD-10-CM

## 2017-02-21 DIAGNOSIS — O09.892 HISTORY OF PRETERM DELIVERY, CURRENTLY PREGNANT, SECOND TRIMESTER: ICD-10-CM

## 2017-02-21 LAB
ERYTHROCYTE [DISTWIDTH] IN BLOOD BY AUTOMATED COUNT: 13.4 %
GLUCOSE SERPL-MCNC: 146 MG/DL
HCT VFR BLD AUTO: 34.5 %
HGB BLD-MCNC: 11.9 G/DL
MCH RBC QN AUTO: 32.5 PG
MCHC RBC AUTO-ENTMCNC: 34.5 %
MCV RBC AUTO: 94 FL
PLATELET # BLD AUTO: 139 K/UL
PMV BLD AUTO: 9.6 FL
RBC # BLD AUTO: 3.66 M/UL
WBC # BLD AUTO: 6.54 K/UL

## 2017-02-21 PROCEDURE — 96372 THER/PROPH/DIAG INJ SC/IM: CPT | Mod: PBBFAC,PN

## 2017-02-21 RX ORDER — HYDROXYPROGESTERONE CAPROATE 250 MG/ML
250 INJECTION INTRAMUSCULAR
Status: COMPLETED | OUTPATIENT
Start: 2017-02-21 | End: 2017-02-21

## 2017-02-21 RX ADMIN — HYDROXYPROGESTERONE CAPROATE 250 MG: 250 INJECTION INTRAMUSCULAR at 10:02

## 2017-02-23 ENCOUNTER — TELEPHONE (OUTPATIENT)
Dept: OBSTETRICS AND GYNECOLOGY | Facility: CLINIC | Age: 32
End: 2017-02-23

## 2017-02-23 DIAGNOSIS — O99.810 ABNORMAL GLUCOSE AFFECTING PREGNANCY: Primary | ICD-10-CM

## 2017-02-23 NOTE — TELEPHONE ENCOUNTER
Advised of glucose results. Scheduled 3 hr gtt.  Advised to fast the night before. BDWallace, LPN

## 2017-02-23 NOTE — TELEPHONE ENCOUNTER
----- Message from Keila Lema MD sent at 2/22/2017  7:05 AM CST -----  Please advise pt, her 1 hr glucola is abnl, needs to do 3 hr gtt; must fast overnight; test takes 3 hrs

## 2017-02-24 ENCOUNTER — LAB VISIT (OUTPATIENT)
Dept: LAB | Facility: HOSPITAL | Age: 32
End: 2017-02-24
Attending: OBSTETRICS & GYNECOLOGY
Payer: MEDICAID

## 2017-02-24 DIAGNOSIS — O99.810 ABNORMAL GLUCOSE AFFECTING PREGNANCY: ICD-10-CM

## 2017-02-24 LAB
GLUCOSE SERPL-MCNC: 117 MG/DL
GLUCOSE SERPL-MCNC: 78 MG/DL
GLUCOSE SERPL-MCNC: 90 MG/DL
GLUCOSE SERPL-MCNC: 92 MG/DL

## 2017-02-24 PROCEDURE — 82951 GLUCOSE TOLERANCE TEST (GTT): CPT

## 2017-02-24 PROCEDURE — 36415 COLL VENOUS BLD VENIPUNCTURE: CPT | Mod: PO

## 2017-02-28 ENCOUNTER — ROUTINE PRENATAL (OUTPATIENT)
Dept: OBSTETRICS AND GYNECOLOGY | Facility: CLINIC | Age: 32
End: 2017-02-28
Payer: MEDICAID

## 2017-02-28 VITALS — DIASTOLIC BLOOD PRESSURE: 60 MMHG | WEIGHT: 124.13 LBS | BODY MASS INDEX: 24.24 KG/M2 | SYSTOLIC BLOOD PRESSURE: 96 MMHG

## 2017-02-28 DIAGNOSIS — N88.3 INCOMPETENT CERVIX: ICD-10-CM

## 2017-02-28 DIAGNOSIS — O34.32 CERVICAL CERCLAGE SUTURE PRESENT IN SECOND TRIMESTER: Primary | ICD-10-CM

## 2017-02-28 PROCEDURE — 96372 THER/PROPH/DIAG INJ SC/IM: CPT | Mod: PBBFAC,PN

## 2017-02-28 PROCEDURE — 99999 PR PBB SHADOW E&M-EST. PATIENT-LVL III: CPT | Mod: PBBFAC,,, | Performed by: OBSTETRICS & GYNECOLOGY

## 2017-02-28 PROCEDURE — 99213 OFFICE O/P EST LOW 20 MIN: CPT | Mod: TH,S$PBB,, | Performed by: OBSTETRICS & GYNECOLOGY

## 2017-02-28 PROCEDURE — 99213 OFFICE O/P EST LOW 20 MIN: CPT | Mod: PBBFAC,PN | Performed by: OBSTETRICS & GYNECOLOGY

## 2017-02-28 RX ORDER — HYDROXYPROGESTERONE CAPROATE 250 MG/ML
250 INJECTION INTRAMUSCULAR
Status: COMPLETED | OUTPATIENT
Start: 2017-02-28 | End: 2017-02-28

## 2017-02-28 RX ADMIN — HYDROXYPROGESTERONE CAPROATE 250 MG: 250 INJECTION INTRAMUSCULAR at 11:02

## 2017-02-28 NOTE — MR AVS SNAPSHOT
[facility] Obstetrics and Gynecology  [address]  [place] [address]  Phone: [phone]                  Ebony Rodriguez   2017 10:30 AM   Routine Prenatal    Description:  Female : 1985   Provider:  Keila Lema MD   Department:  [facility] Obstetrics and Gynecology           Reason for Visit     Routine Prenatal Visit           Diagnoses this Visit        Comments    Incompetent cervix    -  Primary            To Do List           Future Appointments        Provider Department Dept Phone    3/7/2017 10:00 AM OB GYN NURSE, [facility] Obstetrics and Gynecology [phone]      Goals (5 Years of Data)     None      [facility]     [facility] On [facility] Nurse [facility] Line -  Assistance  Registered nurses in the [facility] On Call Center provide clinical advisement, health education, appointment booking, and other advisory services.  Call for this free service at [phone].             Medications           Message regarding Medications     Verify the changes and/or additions to your medication regime listed below are the same as discussed with your clinician today.  If any of these changes or additions are incorrect, please notify your healthcare provider.        These medications were administered today        Dose Freq    JUNIE Oil 250 mg 250 mg Clinic/HOD 1 time    Sig: Inject 1 mL (250 mg total) into the muscle one time.    Class: Normal    Route: Intramuscular    Non-formulary Exception Code: Treatment failure with formulary alternative           Verify that the below list of medications is an accurate representation of the medications you are currently taking.  If none reported, the list may be blank. If incorrect, please contact your healthcare provider. Carry this list with you in case of emergency.           Current Medications     doxylamine-pyridoxine (DICLEGIS) 10-10 mg TbEC Take as directed.    PRENATAL VIT/IRON FUMARATE/FA (PRENATAL 1+1 ORAL) Take by mouth.            Clinical Reference Information           Prenatal Vitals     Enc. Date GA Prenatal Vitals Prenatal Pulse Pain Level Urine Albumin/Glucose Edema Presentation Dilation/Effacement/Station    17 26w5d 96/60 / 56.3 kg (124 lb 1.9 oz)  / 140 / Present  0  None / None / None / No      17 24w5d 116/70 / 55.6 kg (122 lb 9.2 oz)  /  / Present  0        17 22w5d 110/66 / 54 kg (119 lb 0.8 oz) 22 cm / 142 / Present  0        17 20w5d 102/70 / 53.1 kg (117 lb 1 oz) 20 cm / us 163 / Present  0 Negative / Negative None / None / None / No      1/3/17 18w5d 102/64 / 53.7 kg (118 lb 6.2 oz) 18 cm / 145 / Present  0 Negative / Negative None / None / None / No      16 16w5d 100/42 (A) / 50.7 kg (111 lb 12.4 oz)  / 133 u/s  / Absent  0        16 15w5d 108/72 / 51.5 kg (113 lb 8.6 oz)           16 14w5d 97/66 / 50.8 kg (111 lb 15.9 oz)  / 147 / Absent  0 Negative / Negative       16 14w0d 100/60 / 50.8 kg (111 lb 15.9 oz)  / 144 / Absent  0  None / None / None / No      16 13w5d 112/72 / 50.7 kg (111 lb 12.4 oz)  / 154 / Absent  0 Negative / Negative       16 13w1d 98/68 / 51 kg (112 lb 7 oz) 13 cm / 150 / Absent  0 2+ / Negative None / None / None / No         TW.3 kg (11 lb 11 oz)   Pregravid weight: 51 kg (112 lb 7 oz)   Number of fetuses: 1   Height: 5' (1.524 m)   BMI: 22.0       Your Vitals Were     BP                   96/60           Allergies as of 2017     No Known Allergies      Immunizations Administered on Date of Encounter - 2017     None      [facility] Sign-Up     Activating your [facility] account is as easy as 1-2-3!     1) Visit [email or web address], select Sign Up Now, enter this activation code and your date of birth, then select Next.  [email or web address]  Expires: 3/17/2017  5:01 PM      2) Create a username and password to use when you visit [facility] in the future and select a security question in case you lose your password and select  Next.    3) Enter your e-mail address and click Sign Up!    Additional Information  If you have questions, please e-mail [email or web address] or call [phone] to talk to our [facility] staff. Remember, [facility] is NOT to be used for urgent needs. For medical emergencies, dial 911.         Language Assistance Services     ATTENTION: Language assistance services are available, free of charge. Please call [phone].      ATENCIÓN: Si habla jessica, tiene a diallo disposición servicios gratuitos de asistencia lingüística. Llame al [phone].     CHÚ Ý: N?u b?n nói Ti?ng Vi?t, có các d?ch v? h? tr? ngôn ng? mi?n phí dành cho b?n. G?i s? [phone].         [facility] Obstetrics and Gynecology complies with applicable Federal civil rights laws and does not discriminate on the basis of race, color, national origin, age, disability, or sex.

## 2017-02-28 NOTE — PROGRESS NOTES
+fetal movement, no srom, no vag bleeding  josh today  Aware 3 hr gtt wnl  Continue weekly josh until 35w

## 2017-03-07 ENCOUNTER — CLINICAL SUPPORT (OUTPATIENT)
Dept: OBSTETRICS AND GYNECOLOGY | Facility: CLINIC | Age: 32
End: 2017-03-07
Payer: MEDICAID

## 2017-03-07 DIAGNOSIS — O09.892 HISTORY OF PRETERM DELIVERY, CURRENTLY PREGNANT, SECOND TRIMESTER: Primary | ICD-10-CM

## 2017-03-07 PROCEDURE — 96372 THER/PROPH/DIAG INJ SC/IM: CPT | Mod: PBBFAC,PN

## 2017-03-07 RX ORDER — HYDROXYPROGESTERONE CAPROATE 250 MG/ML
250 INJECTION INTRAMUSCULAR
Status: DISCONTINUED | OUTPATIENT
Start: 2017-03-07 | End: 2017-03-07

## 2017-03-07 RX ORDER — HYDROXYPROGESTERONE CAPROATE 250 MG/ML
250 INJECTION INTRAMUSCULAR
Status: DISCONTINUED | OUTPATIENT
Start: 2017-03-07 | End: 2017-03-14

## 2017-03-07 RX ADMIN — HYDROXYPROGESTERONE CAPROATE 250 MG: 250 INJECTION INTRAMUSCULAR at 11:03

## 2017-03-07 NOTE — MR AVS SNAPSHOT
[facility] Obstetrics and Gynecology  [address]  [place] [address]  Phone: [phone]                  Ebony Rodriguez   3/7/2017 10:00 AM   Appointment    Description:  Female : 1985   Provider:  OB GYN NURSESHAYLEE   Department:  [facility] Obstetrics and Gynecology                To Do List           Future Appointments        Provider Department Dept Phone    3/14/2017 10:00 AM OB GYN NURSE, [facility] Obstetrics and Gynecology [phone]      Goals (5 Years of Data)     None      [facility]     [facility] On Call Nurse Care Line -  Assistance  Registered nurses in the [facility] On Call Center provide clinical advisement, health education, appointment booking, and other advisory services.  Call for this free service at [phone].             Medications           Message regarding Medications     Verify the changes and/or additions to your medication regime listed below are the same as discussed with your clinician today.  If any of these changes or additions are incorrect, please notify your healthcare provider.             Verify that the below list of medications is an accurate representation of the medications you are currently taking.  If none reported, the list may be blank. If incorrect, please contact your healthcare provider. Carry this list with you in case of emergency.           Current Medications     doxylamine-pyridoxine (DICLEGIS) 10-10 mg TbEC Take as directed.    PRENATAL VIT/IRON FUMARATE/FA (PRENATAL 1+1 ORAL) Take by mouth.           Clinical Reference Information           Prenatal Vitals     Enc. Date GA Prenatal Vitals Prenatal Pulse Pain Level Urine Albumin/Glucose Edema Presentation Dilation/Effacement/Station    17 26w5d 96/60 / 56.3 kg (124 lb 1.9 oz)  / 140 / Present  0  None / None / None / No      17 24w5d 116/70 / 55.6 kg (122 lb 9.2 oz)  /  / Present  0        17 22w5d 110/66 / 54 kg (119 lb 0.8 oz) 22 cm / 142  / Present  0        17 20w5d 102/70 / 53.1 kg (117 lb 1 oz) 20 cm / us 163 / Present  0 Negative / Negative None / None / None / No      1/3/17 18w5d 102/64 / 53.7 kg (118 lb 6.2 oz) 18 cm / 145 / Present  0 Negative / Negative None / None / None / No      16 16w5d 100/42 (A) / 50.7 kg (111 lb 12.4 oz)  / 133 u/s  / Absent  0        16 15w5d 108/72 / 51.5 kg (113 lb 8.6 oz)           16 14w5d 97/66 / 50.8 kg (111 lb 15.9 oz)  / 147 / Absent  0 Negative / Negative       16 14w0d 100/60 / 50.8 kg (111 lb 15.9 oz)  / 144 / Absent  0  None / None / None / No      16 13w5d 112/72 / 50.7 kg (111 lb 12.4 oz)  / 154 / Absent  0 Negative / Negative       16 13w1d 98/68 / 51 kg (112 lb 7 oz) 13 cm / 150 / Absent  0 2+ / Negative None / None / None / No         TW.3 kg (11 lb 11 oz)   Pregravid weight: 51 kg (112 lb 7 oz)   Number of fetuses: 1   Height: 5' (1.524 m)   BMI: 22.0       Your Vitals Were     Last Period                   2016           Allergies as of 3/7/2017     No Known Allergies      Immunizations Administered on Date of Encounter - 3/7/2017     None      [facility] Sign-Up     Activating your [facility] account is as easy as 1-2-3!     1) Visit [email or web address], select Sign Up Now, enter this activation code and your date of birth, then select Next.  [email or web address]  Expires: 3/17/2017  5:01 PM      2) Create a username and password to use when you visit [facility] in the future and select a security question in case you lose your password and select Next.    3) Enter your e-mail address and click Sign Up!    Additional Information  If you have questions, please e-mail [email or web address] or call [phone] to talk to our [facility] staff. Remember, [facility] is NOT to be used for urgent needs. For medical emergencies, dial 911.         Language Assistance Services     ATTENTION: Language assistance services are available, free of charge. Please call  [phone].      ATENCIÓN: Si habla español, tiene a diallo disposición servicios gratuitos de asistencia lingüística. Llame al [phone].     CHÚ Ý: N?u b?n nói Ti?ng Vi?t, có các d?ch v? h? tr? ngôn ng? mi?n phí dành cho b?n. G?i s? [phone].         [facility] Obst[facility]s [facility] Gynecology complies with applicable Federal civil rights laws and does not discriminate on the basis of race, color, national origin, age, disability, or sex.

## 2017-03-07 NOTE — PROGRESS NOTES
After identifying patient with 2 identifiers, verifying that patient wished to receive Neisha for pre-term labor prevention,  reviewing allergies, [place] 250 mg/ml administered to left ventrogluteal.  Patient tolerated well.  Patient instructed to wait 15 minutes and verbalized understanding.  Next appointment scheduled and AVS printed and given to patient.

## 2017-03-14 ENCOUNTER — CLINICAL SUPPORT (OUTPATIENT)
Dept: OBSTETRICS AND GYNECOLOGY | Facility: CLINIC | Age: 32
End: 2017-03-14
Payer: MEDICAID

## 2017-03-14 DIAGNOSIS — O09.892 HISTORY OF PRETERM DELIVERY, CURRENTLY PREGNANT, SECOND TRIMESTER: ICD-10-CM

## 2017-03-14 PROCEDURE — 96372 THER/PROPH/DIAG INJ SC/IM: CPT | Mod: PBBFAC,PN

## 2017-03-14 RX ORDER — HYDROXYPROGESTERONE CAPROATE 250 MG/ML
250 INJECTION INTRAMUSCULAR
Status: DISCONTINUED | OUTPATIENT
Start: 2017-03-21 | End: 2017-05-10 | Stop reason: HOSPADM

## 2017-03-14 RX ORDER — HYDROXYPROGESTERONE CAPROATE 250 MG/ML
250 INJECTION INTRAMUSCULAR
Status: COMPLETED | OUTPATIENT
Start: 2017-03-14 | End: 2017-03-14

## 2017-03-14 RX ADMIN — HYDROXYPROGESTERONE CAPROATE 250 MG: 250 INJECTION INTRAMUSCULAR at 11:03

## 2017-03-14 NOTE — PROGRESS NOTES
ID pt with name and , verified allergies. Per MAR gave josh oil IM to right ventrogluteal. Pt tolerated well. Advised to remain in clinic 15 minutes, next dose due in 7 days. Scheduled accordingly. JACQUIE Rubio

## 2017-03-21 ENCOUNTER — ROUTINE PRENATAL (OUTPATIENT)
Dept: OBSTETRICS AND GYNECOLOGY | Facility: CLINIC | Age: 32
End: 2017-03-21
Payer: MEDICAID

## 2017-03-21 VITALS — BODY MASS INDEX: 26.17 KG/M2 | WEIGHT: 134 LBS | DIASTOLIC BLOOD PRESSURE: 70 MMHG | SYSTOLIC BLOOD PRESSURE: 108 MMHG

## 2017-03-21 DIAGNOSIS — O09.293 HISTORY OF INCOMPETENT CERVIX, CURRENTLY PREGNANT IN THIRD TRIMESTER: ICD-10-CM

## 2017-03-21 DIAGNOSIS — Z3A.29 29 WEEKS GESTATION OF PREGNANCY: Primary | ICD-10-CM

## 2017-03-21 PROBLEM — Z36.89 ENCOUNTER FOR FETAL ANATOMIC SURVEY: Status: RESOLVED | Noted: 2017-01-17 | Resolved: 2017-03-21

## 2017-03-21 PROCEDURE — 96372 THER/PROPH/DIAG INJ SC/IM: CPT | Mod: PBBFAC,PN

## 2017-03-21 PROCEDURE — 99999 PR PBB SHADOW E&M-EST. PATIENT-LVL II: CPT | Mod: PBBFAC,,, | Performed by: OBSTETRICS & GYNECOLOGY

## 2017-03-21 PROCEDURE — 99213 OFFICE O/P EST LOW 20 MIN: CPT | Mod: TH,S$PBB,, | Performed by: OBSTETRICS & GYNECOLOGY

## 2017-03-21 PROCEDURE — 99212 OFFICE O/P EST SF 10 MIN: CPT | Mod: PBBFAC,PN | Performed by: OBSTETRICS & GYNECOLOGY

## 2017-03-21 PROCEDURE — 90715 TDAP VACCINE 7 YRS/> IM: CPT | Mod: PBBFAC,PN | Performed by: OBSTETRICS & GYNECOLOGY

## 2017-03-21 PROCEDURE — 90471 IMMUNIZATION ADMIN: CPT | Mod: PBBFAC,PN | Performed by: OBSTETRICS & GYNECOLOGY

## 2017-03-21 RX ORDER — HYDROXYPROGESTERONE CAPROATE 250 MG/ML
250 INJECTION INTRAMUSCULAR
Status: DISCONTINUED | OUTPATIENT
Start: 2017-03-21 | End: 2017-04-04

## 2017-03-21 RX ADMIN — HYDROXYPROGESTERONE CAPROATE 250 MG: 250 INJECTION INTRAMUSCULAR at 11:03

## 2017-03-21 NOTE — MR AVS SNAPSHOT
[facility] Obstetrics and Gynecology  [address]  [place] [address]  Phone: [phone]                  Ebony Rodriguez   3/21/2017 11:45 AM   Routine Prenatal    Description:  Female : 1985   Provider:  Radha Romero CNM   Department:  [facility] Obstetrics and Gynecology           Reason for Visit     Routine Prenatal Visit           Diagnoses this Visit        Comments    29 weeks gestation of pregnancy    -  Primary     History of incompetent cervix, currently pregnant in third trimester                To Do List           Future Appointments        Provider Department Dept Phone    3/21/2017 11:45 AM Radha Romero CNM [facility] Obstetrics and Gynecology [phone]    3/28/2017 10:30 AM OB GYN NURSE, [facility] [phone]    2017 11:15 AM Radha Romero CNM [facility] Obstetrics and Gynecology [phone]      Goals (5 Years of Data)     None      Follow-Up and Disposition     Return in about 2 weeks (around 2017).      [facility] On Call     [facility] On Call Nurse [facility] -  Assistance  Registered nurses in the [facility] Center provide clinical advisement, health education, appointment booking, and other advisory services.  Call for this free service at [phone].             Medications           Message regarding Medications     Verify the changes and/or additions to your medication regime listed below are the same as discussed with your clinician today.  If any of these changes or additions are incorrect, please notify your healthcare provider.             Verify that the below list of medications is an accurate representation of the medications you are currently taking.  If none reported, the list may be blank. If incorrect, please contact your healthcare provider. Carry this list with you in case of emergency.           Current Medications     doxylamine-pyridoxine (DICLEGIS) 10-10 mg TbEC Take as directed.     PRENATAL VIT/IRON FUMARATE/FA (PRENATAL 1+1 ORAL) Take by mouth.           Clinical Reference Information           Prenatal Vitals     Enc. Date GA Prenatal Vitals Prenatal Pulse Pain Level Urine Albumin/Glucose Edema Presentation Dilation/Effacement/Station    3/21/17 29w5d 108/70 / 60.8 kg (134 lb) 29 cm / 140 / Present  2  None / None / None / No Vertex     17 26w5d 96/60 / 56.3 kg (124 lb 1.9 oz)  / 140 / Present  0  None / None / None / No      17 24w5d 116/70 / 55.6 kg (122 lb 9.2 oz)  /  / Present  0        17 22w5d 110/66 / 54 kg (119 lb 0.8 oz) 22 cm / 142 / Present  0        17 20w5d 102/70 / 53.1 kg (117 lb 1 oz) 20 cm / us 163 / Present  0 Negative / Negative None / None / None / No      1/3/17 18w5d 102/64 / 53.7 kg (118 lb 6.2 oz) 18 cm / 145 / Present  0 Negative / Negative None / None / None / No      16 16w5d 100/42 (A) / 50.7 kg (111 lb 12.4 oz)  / 133 u/s  / Absent  0        16 15w5d 108/72 / 51.5 kg (113 lb 8.6 oz)           16 14w5d 97/66 / 50.8 kg (111 lb 15.9 oz)  / 147 / Absent  0 Negative / Negative       16 14w0d 100/60 / 50.8 kg (111 lb 15.9 oz)  / 144 / Absent  0  None / None / None / No      16 13w5d 112/72 / 50.7 kg (111 lb 12.4 oz)  / 154 / Absent  0 Negative / Negative       16 13w1d 98/68 / 51 kg (112 lb 7 oz) 13 cm / 150 / Absent  0 2+ / Negative None / None / None / No         TW.782 kg (21 lb 9.1 oz)   Pregravid weight: 51 kg (112 lb 7 oz)   Number of fetuses: 1   Height: 5' (1.524 m)   BMI: 22.0       Your Vitals Were     BP Weight Last Period BMI       108/70 60.8 kg (134 lb) 2016 26.17 kg/m2       Allergies as of 3/21/2017     No Known Allergies      Immunizations Administered on Date of Encounter - 3/21/2017     Name Date Dose VIS Date Route    TDAP  Incomplete 0.5 mL 2015 Intramuscular      Orders Placed During Today's Visit      Normal Orders This Visit    Tdap Vaccine       [facility] Sign-Up      Activating your [facility] account is as easy as 1-2-3!     1) Visit [email or web address], select Sign Up Now, enter this activation code and your date of birth, then select Next.  [email or web address]  Expires: 5/5/2017 11:09 AM      2) Create a username and password to use when you visit [facility] in the future and select a security question in case you lose your password and select Next.    3) Enter your e-mail address and click Sign Up!    Additional Information  If you have questions, please e-mail [email or web address] or call [phone] to talk to our [facility] staff. Remember, [facility] is NOT to be used for urgent needs. For medical emergencies, dial 911.         Language Assistance Services     ATTENTION: Language assistance services are available, free of charge. Please call [phone].      ATENCIÓN: Si habla jessica, tiene a diallo disposición servicios gratuitos de asistencia lingüística. Llame al [phone].     CHYNA Ý: N?u b?n nói Ti?ng Vi?t, có các d?ch v? h? tr? ngôn ng? mi?n phí dành cho b?n. G?i s? [phone].         [place] - Obstetrics and Gynecology complies with applicable Federal civil rights laws and does not discriminate on the basis of race, color, national origin, age, disability, or sex.

## 2017-03-21 NOTE — LETTER
[place] - Obstetrics and Gynecology  [address]  [place] [address]  Phone: [phone]   To Whom It May Concern,    Please be advised that Mrs. Rodriguez has a high risk pregnancy which places her at risk for  delivery.    She will be having her cervical stitches removed at 36-37 weeks of pregnancy and it is possible that she may labor and birth shortly thereafter requiring medical leave to start earlier than her expected due date of 2017.    Respectfully,    Radha Romero, MSN, APRN-CNM

## 2017-03-21 NOTE — PROGRESS NOTES
After identifying patient with 2 identifiers, verifying that patient wished to receive Tdap, reviewing allergies, 0.5 ml Tdap administered to right deltoid.      [place] 250 mg/1 ml administered to right ventrogluteal.      Patient tolerated well.  Patient instructed to wait 15 minutes and verbalized understanding.  Next appointment scheduled and AVS printed and given to patient.

## 2017-03-21 NOTE — PROGRESS NOTES
Good fm/fh.  Common discomforts only.  No ctx, lof, bleeding or spotting.   Desires TDAP/ and for Neisha today.   Gained 10#- TWG 21#   Looks and feels good.   needs FMLA letter.   Will have ready for next week.   RTC  Next wk for [place] and 2 weeks for ob .

## 2017-03-28 ENCOUNTER — CLINICAL SUPPORT (OUTPATIENT)
Dept: OBSTETRICS AND GYNECOLOGY | Facility: CLINIC | Age: 32
End: 2017-03-28
Payer: MEDICAID

## 2017-03-28 PROCEDURE — 96372 THER/PROPH/DIAG INJ SC/IM: CPT | Mod: PBBFAC,PN

## 2017-03-28 RX ORDER — HYDROXYPROGESTERONE CAPROATE 250 MG/ML
250 INJECTION INTRAMUSCULAR
Status: COMPLETED | OUTPATIENT
Start: 2017-03-28 | End: 2017-03-28

## 2017-03-28 RX ADMIN — HYDROXYPROGESTERONE CAPROATE 250 MG: 250 INJECTION INTRAMUSCULAR at 02:03

## 2017-03-28 NOTE — PROGRESS NOTES
After identifying patient with 2 identifiers, verifying that patient wished to receive Neisha, reviewing allergies, 1 ml Neisha administered to left ventrogluteal.  Patient tolerated well.  Patient instructed to wait 15 minutes and verbalized understanding.  Next appointment scheduled and AVS printed and given to patient.

## 2017-03-29 ENCOUNTER — TELEPHONE (OUTPATIENT)
Dept: OBSTETRICS AND GYNECOLOGY | Facility: CLINIC | Age: 32
End: 2017-03-29

## 2017-03-29 NOTE — TELEPHONE ENCOUNTER
----- Message from Aby Haas MA sent at 3/29/2017  7:35 AM CDT -----      ----- Message -----     From: Marcella Millan LPN     Sent: 3/28/2017   2:41 PM       To: [facility] Staff    Please reorder Neisha on March 31 for this patient.  Thank you,  Marcella

## 2017-04-04 ENCOUNTER — ROUTINE PRENATAL (OUTPATIENT)
Dept: OBSTETRICS AND GYNECOLOGY | Facility: CLINIC | Age: 32
End: 2017-04-04
Payer: MEDICAID

## 2017-04-04 ENCOUNTER — PROCEDURE VISIT (OUTPATIENT)
Dept: OBSTETRICS AND GYNECOLOGY | Facility: CLINIC | Age: 32
End: 2017-04-04
Payer: MEDICAID

## 2017-04-04 VITALS
BODY MASS INDEX: 24.97 KG/M2 | WEIGHT: 127.88 LBS | SYSTOLIC BLOOD PRESSURE: 108 MMHG | DIASTOLIC BLOOD PRESSURE: 64 MMHG

## 2017-04-04 DIAGNOSIS — O09.293 HISTORY OF INCOMPETENT CERVIX, CURRENTLY PREGNANT IN THIRD TRIMESTER: ICD-10-CM

## 2017-04-04 DIAGNOSIS — Z3A.31 31 WEEKS GESTATION OF PREGNANCY: ICD-10-CM

## 2017-04-04 DIAGNOSIS — O09.293 HISTORY OF INCOMPETENT CERVIX, CURRENTLY PREGNANT IN THIRD TRIMESTER: Primary | ICD-10-CM

## 2017-04-04 PROCEDURE — 76819 FETAL BIOPHYS PROFIL W/O NST: CPT | Mod: 26,S$PBB,, | Performed by: OBSTETRICS & GYNECOLOGY

## 2017-04-04 PROCEDURE — 76816 OB US FOLLOW-UP PER FETUS: CPT | Mod: 26,S$PBB,, | Performed by: OBSTETRICS & GYNECOLOGY

## 2017-04-04 PROCEDURE — 99999 PR PBB SHADOW E&M-EST. PATIENT-LVL II: CPT | Mod: PBBFAC,,, | Performed by: OBSTETRICS & GYNECOLOGY

## 2017-04-04 PROCEDURE — 99213 OFFICE O/P EST LOW 20 MIN: CPT | Mod: TH,S$PBB,, | Performed by: OBSTETRICS & GYNECOLOGY

## 2017-04-04 PROCEDURE — 76819 FETAL BIOPHYS PROFIL W/O NST: CPT | Mod: PBBFAC,PN | Performed by: OBSTETRICS & GYNECOLOGY

## 2017-04-04 PROCEDURE — 76816 OB US FOLLOW-UP PER FETUS: CPT | Mod: PBBFAC,PN | Performed by: OBSTETRICS & GYNECOLOGY

## 2017-04-04 RX ORDER — HYDROXYPROGESTERONE CAPROATE 250 MG/ML
250 INJECTION INTRAMUSCULAR
Status: SHIPPED | OUTPATIENT
Start: 2017-04-11 | End: 2017-05-09

## 2017-04-04 RX ADMIN — HYDROXYPROGESTERONE CAPROATE 250 MG: 250 INJECTION INTRAMUSCULAR at 11:04

## 2017-04-04 NOTE — PROGRESS NOTES
ADDENDUM US:  Fhr 144, MVP 4.8 cm, ROBE 11.3, growth 12%tile ( 3# 8 oz), BPP 8/8. Overall reassuring.

## 2017-04-04 NOTE — PROGRESS NOTES
States not feeling well overall with decreased appetite. Anti-emetics make nausea worse.  Lost 7#.   FH < 2 cm.  Vertex present.  No ctx, leak, spotting or cramping.  No s/s PTL/ PRE-E.  Neisha 250 mg IM cont. Till 36 wks.  Plan for cerclage removal at that time.  US today to evaluate growth, fluid, bpp/kenia.   RTC weekly for [place] and OV 2 wks if US today reassuring.

## 2017-04-04 NOTE — MR AVS SNAPSHOT
[facility] Obstetrics and Gynecology  [address] [place] [address]  [place] [address]  Phone: [phone]                  Ebony Rodriguez   2017 11:15 AM   Routine Prenatal    Description:  Female : 1985   Provider:  Radha Romero CNM   Department:  [facility] Obstetrics and Gynecology           Reason for Visit     Routine Prenatal Visit           Diagnoses this Visit        Comments    History of incompetent cervix, currently pregnant in third trimester    -  Primary     31 weeks gestation of pregnancy                To Do List           Future Appointments        Provider Department Dept Phone    2017 2:00 PM ULTRA SOUND, OB-GYN-[facility] Obstetrics and Gynecology [phone]    2017 10:00 AM OB GYN NURSE, [facility] Obstetrics [facility] Gynecology [phone]    2017 11:30 AM Radha Romero CNM [facility] Obstetrics [facility] Gynecology [phone]      Goals (5 Years of Data)     None      Follow-Up and Disposition     Return in about 2 weeks (around 2017).      [facility]     [facility] Nurse Care Line -  Assistance  Unless otherwise directed by your provider, please contact [facility], our nurse care line that is available for  assistance.     Registered nurses in the [facility] Center provide: appointment scheduling, clinical advisement, health education, and other advisory services.  Call: [phone] (toll free)               Medications           Message regarding Medications     Verify the changes and/or additions to your medication regime listed below are the same as discussed with your clinician today.  If any of these changes or additions are incorrect, please notify your healthcare provider.        STOP taking these medications     JUNIE Oil 250 mg            Verify that the below list of medications is an accurate representation of the medications you are currently taking.  If none reported, the list may be blank. If incorrect,  please contact your healthcare provider. Carry this list with you in case of emergency.           Current Medications     PRENATAL VIT/IRON FUMARATE/FA (PRENATAL 1+1 ORAL) Take by mouth.    doxylamine-pyridoxine (DICLEGIS) 10-10 mg TbEC Take as directed.           Clinical Reference Information           Prenatal Vitals     Enc. Date GA Prenatal Vitals Prenatal Pulse Pain Level Urine Albumin/Glucose Edema Presentation Dilation/Effacement/Station    17 31w5d 108/64 / 58 kg (127 lb 13.9 oz) 30 cm / 158 / Present  0  None / None / None / No Vertex     3/21/17 29w5d 108/70 / 60.8 kg (134 lb) 29 cm / 140 / Present  2  None / None / None / No Vertex     17 26w5d 96/60 / 56.3 kg (124 lb 1.9 oz)  / 140 / Present  0  None / None / None / No      17 24w5d 116/70 / 55.6 kg (122 lb 9.2 oz)  /  / Present  0        17 22w5d 110/66 / 54 kg (119 lb 0.8 oz) 22 cm / 142 / Present  0        17 20w5d 102/70 / 53.1 kg (117 lb 1 oz) 20 cm / us 163 / Present  0 Negative / Negative None / None / None / No      1/3/17 18w5d 102/64 / 53.7 kg (118 lb 6.2 oz) 18 cm / 145 / Present  0 Negative / Negative None / None / None / No      16 16w5d 100/42 (A) / 50.7 kg (111 lb 12.4 oz)  / 133 u/s  / Absent  0        16 15w5d 108/72 / 51.5 kg (113 lb 8.6 oz)           16 14w5d 97/66 / 50.8 kg (111 lb 15.9 oz)  / 147 / Absent  0 Negative / Negative       16 14w0d 100/60 / 50.8 kg (111 lb 15.9 oz)  / 144 / Absent  0  None / None / None / No      16 13w5d 112/72 / 50.7 kg (111 lb 12.4 oz)  / 154 / Absent  0 Negative / Negative       16 13w1d 98/68 / 51 kg (112 lb 7 oz) 13 cm / 150 / Absent  0 2+ / Negative None / None / None / No         TW kg (15 lb 6.9 oz)   Pregravid weight: 51 kg (112 lb 7 oz)   Number of fetuses: 1   Height: 5' (1.524 m)   BMI: 22.0       Your Vitals Were     BP Weight Last Period BMI       108/64 58 kg (127 lb 13.9 oz) 2016 24.97 kg/m2       Allergies as of  4/4/2017     No Known Allergies      Immunizations Administered on Date of Encounter - 4/4/2017     None      Orders Placed During Today's Visit     Future Labs/Procedures Expected by Expires    US OB/GYN Procedure ([facility])  As directed 4/4/2018      [facility] Sign-Up     Activating your [facility] account is as easy as 1-2-3!     1) Visit [email or web address], select Sign Up Now, enter this activation code and your date of birth, then select Next.  [email or web address]  Expires: 5/5/2017 11:09 AM      2) Create a username and password to use when you visit [facility] in the future and select a security question in case you lose your password and select Next.    3) Enter your e-mail address and click Sign Up!    Additional Information  If you have questions, please e-mail [email or web address] or call [phone] to talk to our [facility] staff. Remember, [facility] is NOT to be used for urgent needs. For medical emergencies, dial 911.         Language Assistance Services     ATTENTION: Language assistance services are available, free of charge. Please call [phone].      ATENCIÓN: Si habla español, tiene a diallo disposición servicios gratuitos de asistencia lingüística. Llame al [phone].     CHYNA Ý: N?u b?n nói Ti?ng Vi?t, có các d?ch v? h? tr? ngôn ng? mi?n phí dành cho b?n. G?i s? [phone].         [facility] Obstetrics and Gynecology complies with applicable Federal civil rights laws and does not discriminate on the basis of race, color, national origin, age, disability, or sex.

## 2017-04-04 NOTE — PROGRESS NOTES
After identifying patient with 2 identifiers, verifying that patient wished to receive Neisha, reviewing allergies, 1 ml Neisha administered right ventrogluteal.  Patient tolerated well.  Patient instructed to wait 15 minutes and verbalized understanding.  Next appointment scheduled and AVS printed and given to patient.

## 2017-04-11 ENCOUNTER — CLINICAL SUPPORT (OUTPATIENT)
Dept: OBSTETRICS AND GYNECOLOGY | Facility: CLINIC | Age: 32
End: 2017-04-11
Payer: MEDICAID

## 2017-04-11 DIAGNOSIS — O09.293 HISTORY OF INCOMPETENT CERVIX, CURRENTLY PREGNANT IN THIRD TRIMESTER: ICD-10-CM

## 2017-04-11 PROCEDURE — 96372 THER/PROPH/DIAG INJ SC/IM: CPT | Mod: PBBFAC,PN

## 2017-04-11 PROCEDURE — 99999 PR PBB SHADOW E&M-EST. PATIENT-LVL I: CPT | Mod: PBBFAC,,,

## 2017-04-11 PROCEDURE — 99211 OFF/OP EST MAY X REQ PHY/QHP: CPT | Mod: PBBFAC,PN

## 2017-04-11 RX ORDER — HYDROXYPROGESTERONE CAPROATE 250 MG/ML
250 INJECTION INTRAMUSCULAR
Status: COMPLETED | OUTPATIENT
Start: 2017-04-11 | End: 2017-04-11

## 2017-04-11 RX ADMIN — HYDROXYPROGESTERONE CAPROATE 250 MG: 250 INJECTION INTRAMUSCULAR at 10:04

## 2017-04-11 NOTE — PROGRESS NOTES
[place] 250mg given IM as ordered by . Pt verbalized understanding and tolerated injection well.

## 2017-04-12 ENCOUNTER — HOSPITAL ENCOUNTER (OUTPATIENT)
Facility: HOSPITAL | Age: 32
Discharge: HOME | End: 2017-04-12
Attending: OBSTETRICS & GYNECOLOGY | Admitting: OBSTETRICS & GYNECOLOGY
Payer: MEDICAID

## 2017-04-12 PROBLEM — Z34.93 THIRD TRIMESTER PREGNANCY AT LESS THAN 36 WEEKS: Status: ACTIVE | Noted: 2017-04-12

## 2017-04-18 ENCOUNTER — ROUTINE PRENATAL (OUTPATIENT)
Dept: OBSTETRICS AND GYNECOLOGY | Facility: CLINIC | Age: 32
End: 2017-04-18
Payer: MEDICAID

## 2017-04-18 VITALS — WEIGHT: 130 LBS | SYSTOLIC BLOOD PRESSURE: 108 MMHG | DIASTOLIC BLOOD PRESSURE: 70 MMHG | BODY MASS INDEX: 25.39 KG/M2

## 2017-04-18 DIAGNOSIS — O47.9 BRAXTON HICKS CONTRACTIONS: ICD-10-CM

## 2017-04-18 DIAGNOSIS — Z3A.33 33 WEEKS GESTATION OF PREGNANCY: Primary | ICD-10-CM

## 2017-04-18 DIAGNOSIS — O09.293 HISTORY OF INCOMPETENT CERVIX, CURRENTLY PREGNANT IN THIRD TRIMESTER: ICD-10-CM

## 2017-04-18 PROCEDURE — 96372 THER/PROPH/DIAG INJ SC/IM: CPT | Mod: PBBFAC,PN

## 2017-04-18 PROCEDURE — 99999 PR PBB SHADOW E&M-EST. PATIENT-LVL II: CPT | Mod: PBBFAC,,, | Performed by: OBSTETRICS & GYNECOLOGY

## 2017-04-18 PROCEDURE — 99212 OFFICE O/P EST SF 10 MIN: CPT | Mod: PBBFAC,PN | Performed by: OBSTETRICS & GYNECOLOGY

## 2017-04-18 PROCEDURE — 99213 OFFICE O/P EST LOW 20 MIN: CPT | Mod: TH,S$PBB,, | Performed by: OBSTETRICS & GYNECOLOGY

## 2017-04-18 RX ORDER — HYDROXYPROGESTERONE CAPROATE 250 MG/ML
250 INJECTION INTRAMUSCULAR
Status: DISCONTINUED | OUTPATIENT
Start: 2017-04-18 | End: 2017-05-10 | Stop reason: HOSPADM

## 2017-04-18 RX ADMIN — HYDROXYPROGESTERONE CAPROATE 250 MG: 250 INJECTION INTRAMUSCULAR at 12:04

## 2017-04-18 NOTE — PROGRESS NOTES
Seen in hospital for cramping episode. No VE done as was told not having regular ctx.  Patient denies spotting or bleeding or lof.   Still with much BH and increased white discharge w/o itching or burning.   Gentle VE done. Cerclage intact. Cervix closed/ 70% eff. , vertex engaged, contributing to pelvic pressure and inner groin discomfort.  No bleeding or pink on glove.   PTL precautions/pelvic rest continue.   [place] 250 mg IM today   Will see me next week and then discuss POC for stitch removal with MD.   RTC 1 wk.

## 2017-04-18 NOTE — PROGRESS NOTES
After identifying patient with 2 identifiers, verifying that patient wished to receive flu vaccine, reviewing allergies, Neisha administered to right ventrogluteal.  Patient tolerated well.  Patient instructed to wait 15 minutes and verbalized understanding.  Next appointment scheduled and AVS printed and given to patient.

## 2017-04-25 ENCOUNTER — ROUTINE PRENATAL (OUTPATIENT)
Dept: OBSTETRICS AND GYNECOLOGY | Facility: CLINIC | Age: 32
End: 2017-04-25
Payer: MEDICAID

## 2017-04-25 VITALS — WEIGHT: 130 LBS | SYSTOLIC BLOOD PRESSURE: 113 MMHG | BODY MASS INDEX: 25.39 KG/M2 | DIASTOLIC BLOOD PRESSURE: 70 MMHG

## 2017-04-25 DIAGNOSIS — O09.293: Primary | ICD-10-CM

## 2017-04-25 DIAGNOSIS — Z3A.34 34 WEEKS GESTATION OF PREGNANCY: ICD-10-CM

## 2017-04-25 PROCEDURE — 99213 OFFICE O/P EST LOW 20 MIN: CPT | Mod: TH,S$PBB,, | Performed by: OBSTETRICS & GYNECOLOGY

## 2017-04-25 PROCEDURE — 99213 OFFICE O/P EST LOW 20 MIN: CPT | Mod: PBBFAC,PN | Performed by: OBSTETRICS & GYNECOLOGY

## 2017-04-25 PROCEDURE — 99999 PR PBB SHADOW E&M-EST. PATIENT-LVL III: CPT | Mod: PBBFAC,,, | Performed by: OBSTETRICS & GYNECOLOGY

## 2017-04-25 RX ORDER — HYDROXYPROGESTERONE CAPROATE 250 MG/ML
250 INJECTION INTRAMUSCULAR
Status: DISCONTINUED | OUTPATIENT
Start: 2017-04-25 | End: 2017-04-25

## 2017-04-25 RX ORDER — HYDROXYPROGESTERONE CAPROATE 250 MG/ML
250 INJECTION INTRAMUSCULAR
Status: DISCONTINUED | OUTPATIENT
Start: 2017-04-26 | End: 2017-05-10 | Stop reason: HOSPADM

## 2017-04-25 RX ADMIN — HYDROXYPROGESTERONE CAPROATE 250 MG: 250 INJECTION INTRAMUSCULAR at 12:04

## 2017-04-25 NOTE — PROGRESS NOTES
Good fm/fm.   No further cramping secondary to increasing fluid intake.    No indication for VE today.   Feels and looks good.  No spotting, lof.  Baby active.   [place] 250 mg IM today, with last injection next ov.  Sees Dr. Lema next week for cerclage removal in office per her request.  RTC 1 week.     [place] 250 mg IM today. LVG. Lot # [id number]  MW

## 2017-04-25 NOTE — MR AVS SNAPSHOT
[facility] Obstetrics and Gynecology  [address]  [place] [address]  Phone: [phone]                  Ebony Rodriguez   2017 2:00 PM   Routine Prenatal    Description:  Female : 1985   Provider:  Radha Romero CNM   Department:  [facility] Obstetrics and Gynecology           Reason for Visit     Routine Prenatal Visit           Diagnoses this Visit        Comments    History of incompetent cervix, currently pregnant, third trimester    -  Primary     34 weeks gestation of pregnancy                To Do List           Future Appointments        Provider Department Dept Phone    2017 2:00 PM Radha Romero CNM [facility] Obstetrics and Gynecology [phone]    2017 10:45 AM Keila Lema MD [facility] Obstetrics [facility] Gynecology [phone]      Goals (5 Years of Data)     None      Follow-Up and Disposition     Return in about 1 week (around 2017).      [facility]     [facility] On Call Nurse Care Line -  Assistance  Unless otherwise directed by your provider, please contact [facility], our nurse care line that is available for  assistance.     Registered nurses in the [facility] On Call Center provide: appointment scheduling, clinical advisement, health education, and other advisory services.  Call: [phone] (toll free)               Medications           Message regarding Medications     Verify the changes and/or additions to your medication regime listed below are the same as discussed with your clinician today.  If any of these changes or additions are incorrect, please notify your healthcare provider.        These medications were administered today        Dose Freq    JUNIE Oil 250 mg 250 mg Every 7 days    Sig: Inject 1 mL (250 mg total) into the muscle every 7 days.    Class: Normal    Route: Intramuscular    Non-formulary Exception Code: Defer to pharmacy           Verify that the below list of medications is an accurate  representation of the medications you are currently taking.  If none reported, the list may be blank. If incorrect, please contact your healthcare provider. Carry this list with you in case of emergency.           Current Medications     doxylamine-pyridoxine (DICLEGIS) 10-10 mg TbEC Take as directed.    PRENATAL VIT/IRON FUMARATE/FA (PRENATAL 1+1 ORAL) Take by mouth.           Clinical Reference Information           Prenatal Vitals     Enc. Date GA Prenatal Vitals Prenatal Pulse Pain Level Urine Albumin/Glucose Edema Presentation Dilation/Effacement/Station    4/25/17 34w5d 113/70 / 59 kg (130 lb) 34 cm / 148 / Present          4/18/17 33w5d 108/70 / 59 kg (130 lb) 33 cm / 142 / Present    None / None / None / No Vertex 0 / 70 / 0    4/12/17 32w6d Admission Dept: [facility] L&D    4/4/17 31w5d 108/64 / 58 kg (127 lb 13.9 oz) 30 cm / 158 / Present  0  None / None / None / No Vertex     3/21/17 29w5d 108/70 / 60.8 kg (134 lb) 29 cm / 140 / Present  2  None / None / None / No Vertex     2/28/17 26w5d 96/60 / 56.3 kg (124 lb 1.9 oz)  / 140 / Present  0  None / None / None / No      2/14/17 24w5d 116/70 / 55.6 kg (122 lb 9.2 oz)  /  / Present  0        1/31/17 22w5d 110/66 / 54 kg (119 lb 0.8 oz) 22 cm / 142 / Present  0        1/17/17 20w5d 102/70 / 53.1 kg (117 lb 1 oz) 20 cm / us 163 / Present  0 Negative / Negative None / None / None / No      1/3/17 18w5d 102/64 / 53.7 kg (118 lb 6.2 oz) 18 cm / 145 / Present  0 Negative / Negative None / None / None / No      12/20/16 16w5d 100/42 (A) / 50.7 kg (111 lb 12.4 oz)  / 133 u/s  / Absent  0        12/13/16 15w5d 108/72 / 51.5 kg (113 lb 8.6 oz)           12/6/16 14w5d 97/66 / 50.8 kg (111 lb 15.9 oz)  / 147 / Absent  0 Negative / Negative       12/1/16 14w0d 100/60 / 50.8 kg (111 lb 15.9 oz)  / 144 / Absent  0  None / None / None / No      11/29/16 13w5d 112/72 / 50.7 kg (111 lb 12.4 oz)  / 154 / Absent  0 Negative / Negative       11/25/16 13w1d 98/68 / 51 kg (112 lb  7 oz) 13 cm / 150 / Absent  0 2+ / Negative None / None / None / No         TW.968 kg (17 lb 9.1 oz)   Pregravid weight: 51 kg (112 lb 7 oz)   Number of fetuses: 1   Height: 5' (1.524 m)   BMI: 22.0       Your Vitals Were     BP Weight Last Period BMI       113/70 59 kg (130 lb) 2016 25.39 kg/m2       Allergies as of 2017     No Known Allergies      Immunizations Administered on Date of Encounter - 2017     None      [facility] Sign-Up     Activating your [facility] account is as easy as 1-2-3!     1) Visit [email or web address], select Sign Up Now, enter this activation code and your date of birth, then select Next.  [email or web address]  Expires: 2017 11:09 AM      2) Create a username and password to use when you visit [facility] in the future and select a security question in case you lose your password and select Next.    3) Enter your e-mail address and click Sign Up!    Additional Information  If you have questions, please e-mail [email or web address] or call [phone] to talk to our [facility] staff. Remember, [facility] is NOT to be used for urgent needs. For medical emergencies, dial 911.         Language Assistance Services     ATTENTION: Language assistance services are available, free of charge. Please call [phone].      ATENCIÓN: Si habla español, tiene a diallo disposición servicios gratuitos de asistencia lingüística. Llame al [phone].     CHÚ Ý: N?u b?n nói Ti?ng Vi?t, có các d?ch v? h? tr? ngôn ng? mi?n phí dành cho b?n. G?i s? [phone].         [facility] Obstetrics [facility] Gynecology complies with applicable Federal civil rights laws and does not discriminate on the basis of race, color, national origin, age, disability, or sex.

## 2017-05-02 ENCOUNTER — LAB VISIT (OUTPATIENT)
Dept: LAB | Facility: HOSPITAL | Age: 32
End: 2017-05-02
Attending: OBSTETRICS & GYNECOLOGY
Payer: MEDICAID

## 2017-05-02 ENCOUNTER — ROUTINE PRENATAL (OUTPATIENT)
Dept: OBSTETRICS AND GYNECOLOGY | Facility: CLINIC | Age: 32
End: 2017-05-02
Payer: MEDICAID

## 2017-05-02 ENCOUNTER — TELEPHONE (OUTPATIENT)
Dept: OBSTETRICS AND GYNECOLOGY | Facility: HOSPITAL | Age: 32
End: 2017-05-02

## 2017-05-02 VITALS — BODY MASS INDEX: 25.39 KG/M2 | DIASTOLIC BLOOD PRESSURE: 68 MMHG | SYSTOLIC BLOOD PRESSURE: 108 MMHG | WEIGHT: 130 LBS

## 2017-05-02 DIAGNOSIS — O09.293: ICD-10-CM

## 2017-05-02 DIAGNOSIS — O09.293: Primary | ICD-10-CM

## 2017-05-02 LAB
ERYTHROCYTE [DISTWIDTH] IN BLOOD BY AUTOMATED COUNT: 13.3 %
HCT VFR BLD AUTO: 34.8 %
HGB BLD-MCNC: 11.9 G/DL
MCH RBC QN AUTO: 31.1 PG
MCHC RBC AUTO-ENTMCNC: 34.2 %
MCV RBC AUTO: 91 FL
PLATELET # BLD AUTO: 118 K/UL
PMV BLD AUTO: 9.9 FL
RBC # BLD AUTO: 3.83 M/UL
WBC # BLD AUTO: 6.22 K/UL

## 2017-05-02 PROCEDURE — 86592 SYPHILIS TEST NON-TREP QUAL: CPT

## 2017-05-02 PROCEDURE — 85027 COMPLETE CBC AUTOMATED: CPT

## 2017-05-02 PROCEDURE — 36415 COLL VENOUS BLD VENIPUNCTURE: CPT | Mod: PO

## 2017-05-02 PROCEDURE — 99213 OFFICE O/P EST LOW 20 MIN: CPT | Mod: TH,S$PBB,, | Performed by: OBSTETRICS & GYNECOLOGY

## 2017-05-02 PROCEDURE — 86703 HIV-1/HIV-2 1 RESULT ANTBDY: CPT

## 2017-05-02 PROCEDURE — 99999 PR PBB SHADOW E&M-EST. PATIENT-LVL II: CPT | Mod: 25,PBBFAC,, | Performed by: OBSTETRICS & GYNECOLOGY

## 2017-05-02 RX ORDER — HYDROXYPROGESTERONE CAPROATE 250 MG/ML
250 INJECTION INTRAMUSCULAR
Status: COMPLETED | OUTPATIENT
Start: 2017-05-02 | End: 2017-05-02

## 2017-05-02 RX ADMIN — HYDROXYPROGESTERONE CAPROATE 250 MG: 250 INJECTION INTRAMUSCULAR at 11:05

## 2017-05-02 NOTE — PROGRESS NOTES
gbs today  C/o increased pressure  Cerclage to be removed in 2 days  Cbc, hiv,rpr ordered  sono to confirm position  +fetal movement, no srom, no vag bleeding

## 2017-05-02 NOTE — MR AVS SNAPSHOT
[facility] Obstetrics and Gynecology  [address]  [place] [address]  Phone: [phone]                  Ebony Rodriguez   2017 10:45 AM   Routine Prenatal    Description:  Female : 1985   Provider:  Keila Lema MD   Department:  [facility] Obstetrics and Gynecology           Reason for Visit     Routine Prenatal Visit           Diagnoses this Visit        Comments    History of incompetent cervix, currently pregnant, third trimester    -  Primary            To Do List           Future Appointments        Provider Department Dept Phone    2017 11:50 AM LABORATORY, [facility]     2017 2:30 PM ULTRA SOUND, OB-GYN-[facility] Obstetrics and Gynecology [phone]    2017 3:00 PM Keila Lema MD [facility] Ob[facility]etric[facility] Gynecology [phone]      Goals (5 Years of Data)     None      Follow-Up and Disposition     Return in about 2 days (around 2017) for cerclage removal.    Follow-up and Disposition History      [facility]     [facility] Nurse Care Line -  Assistance  Unless otherwise directed by your provider, please contact [facility], our nurse care line that is available for  assistance.     Registered nurses in the [facility] provide: appointment scheduling, clinical advisement, health education, and other advisory services.  Call: [phone] (toll free)               Medications           Message regarding Medications     Verify the changes and/or additions to your medication regime listed below are the same as discussed with your clinician today.  If any of these changes or additions are incorrect, please notify your healthcare provider.        These medications were administered today        Dose Freq    JUNIE Oil 250 mg 250 mg Clinic/HOD 1 time    Sig: Inject 1 mL (250 mg total) into the muscle one time.    Class: Normal    Route: Intramuscular    Non-formulary Exception Code: Defer  to pharmacy           Verify that the below list of medications is an accurate representation of the medications you are currently taking.  If none reported, the list may be blank. If incorrect, please contact your healthcare provider. Carry this list with you in case of emergency.           Current Medications     doxylamine-pyridoxine (DICLEGIS) 10-10 mg TbEC Take as directed.    PRENATAL VIT/IRON FUMARATE/FA (PRENATAL 1+1 ORAL) Take by mouth.           Clinical Reference Information           Prenatal Vitals     Enc. Date GA Prenatal Vitals Prenatal Pulse Pain Level Urine Albumin/Glucose Edema Presentation Dilation/Effacement/Station    5/2/17 35w5d 108/68 / 59 kg (130 lb)  / 136 / Present          4/25/17 34w5d 113/70 / 59 kg (130 lb) 34 cm / 148 / Present          4/18/17 33w5d 108/70 / 59 kg (130 lb) 33 cm / 142 / Present    None / None / None / No Vertex 0 / 70 / 0    4/12/17 32w6d Admission Dept: [facility] L&D    4/4/17 31w5d 108/64 / 58 kg (127 lb 13.9 oz) 30 cm / 158 / Present  0  None / None / None / No Vertex     3/21/17 29w5d 108/70 / 60.8 kg (134 lb) 29 cm / 140 / Present  2  None / None / None / No Vertex     2/28/17 26w5d 96/60 / 56.3 kg (124 lb 1.9 oz)  / 140 / Present  0  None / None / None / No      2/14/17 24w5d 116/70 / 55.6 kg (122 lb 9.2 oz)  /  / Present  0        1/31/17 22w5d 110/66 / 54 kg (119 lb 0.8 oz) 22 cm / 142 / Present  0        1/17/17 20w5d 102/70 / 53.1 kg (117 lb 1 oz) 20 cm / us 163 / Present  0 Negative / Negative None / None / None / No      1/3/17 18w5d 102/64 / 53.7 kg (118 lb 6.2 oz) 18 cm / 145 / Present  0 Negative / Negative None / None / None / No      12/20/16 16w5d 100/42 (A) / 50.7 kg (111 lb 12.4 oz)  / 133 u/s  / Absent  0        12/13/16 15w5d 108/72 / 51.5 kg (113 lb 8.6 oz)           12/6/16 14w5d 97/66 / 50.8 kg (111 lb 15.9 oz)  / 147 / Absent  0 Negative / Negative       12/1/16 14w0d 100/60 / 50.8 kg (111 lb 15.9 oz)  / 144 / Absent  0  None / None / None  / No      16 13w5d 112/72 / 50.7 kg (111 lb 12.4 oz)  / 154 / Absent  0 Negative / Negative       16 13w1d 98/68 / 51 kg (112 lb 7 oz) 13 cm / 150 / Absent  0 2+ / Negative None / None / None / No         TW.968 kg (17 lb 9.1 oz)   Pregravid weight: 51 kg (112 lb 7 oz)   Number of fetuses: 1   Height: 5' (1.524 m)   BMI: 22.0       Your Vitals Were     BP Weight Last Period BMI       108/68 59 kg (130 lb) 2016 25.39 kg/m2       Allergies as of 2017     No Known Allergies      Immunizations Administered on Date of Encounter - 2017     None      Orders Placed During Today's Visit      Normal Orders This Visit    Strep B Screen, Vaginal / Rectal     Future Labs/Procedures Expected by Expires    CBC Without Differential  2017    HIV-1 and HIV-2 antibodies  2017    RPR  2017    US OB/GYN Procedure (Viewpoint)  As directed 2018      [facility] Sign-Up     Activating your [facility] account is as easy as 1-2-3!     1) Visit [email or web address], select Sign Up Now, enter this activation code and your date of birth, then select Next.  [email or web address]  Expires: 2017 11:09 AM      2) Create a username and password to use when you visit [facility] in the future and select a security question in case you lose your password and select Next.    3) Enter your e-mail address and click Sign Up!    Additional Information  If you have questions, please e-mail [email or web address] or call [phone] to talk to our [facility] staff. Remember, [facility] is NOT to be used for urgent needs. For medical emergencies, dial 911.         Language Assistance Services     ATTENTION: Language assistance services are available, free of charge. Please call [phone].      ATENCIÓN: Si habla español, tiene a diallo disposición servicios gratuitos de asistencia lingüística. Glenn al [phone].     CHYNA Ý: N?u b?n nói Ti?ng Vi?t, có các d?ch v? h? tr? ngôn ng? mi?n [facility] ricardo  malia barrett?isha STEVEN?i s? [phone].         [place] - Obstetrics and Gynecology complies with applicable Federal civil rights laws and does not discriminate on the basis of race, color, national origin, age, disability, or sex.

## 2017-05-02 NOTE — PROGRESS NOTES
After identifying patient with 2 identifiers, verifying that patient wished to receive flu vaccine, reviewing allergies, Neisha 250 mg/ml administered to right ventrogluteal.  Patient tolerated well.  Patient instructed to wait 15 minutes and verbalized understanding.  Next appointment scheduled and AVS printed and given to patient.

## 2017-05-03 LAB
HIV 1+2 AB+HIV1 P24 AG SERPL QL IA: NEGATIVE
RPR SER QL: NORMAL

## 2017-05-03 NOTE — TELEPHONE ENCOUNTER
Pt called c/o perineal swelling and increased pressure. Denies bleeding or contractions. Explained normal in pregnancy, apply ice to perineum. Reassess in the AM. If pain worsens through the night, water breaks, or has bleeding come in to be assessed.

## 2017-05-05 LAB — BACTERIA SPEC AEROBE CULT: NORMAL

## 2017-05-09 ENCOUNTER — PROCEDURE VISIT (OUTPATIENT)
Dept: OBSTETRICS AND GYNECOLOGY | Facility: CLINIC | Age: 32
End: 2017-05-09
Payer: MEDICAID

## 2017-05-09 ENCOUNTER — ROUTINE PRENATAL (OUTPATIENT)
Dept: OBSTETRICS AND GYNECOLOGY | Facility: CLINIC | Age: 32
End: 2017-05-09
Payer: MEDICAID

## 2017-05-09 VITALS — BODY MASS INDEX: 26.57 KG/M2 | WEIGHT: 136 LBS | DIASTOLIC BLOOD PRESSURE: 69 MMHG | SYSTOLIC BLOOD PRESSURE: 108 MMHG

## 2017-05-09 DIAGNOSIS — O09.293: ICD-10-CM

## 2017-05-09 DIAGNOSIS — O26.843 UTERINE SIZE DATE DISCREPANCY PREGNANCY, THIRD TRIMESTER: ICD-10-CM

## 2017-05-09 DIAGNOSIS — O34.33 CERVICAL CERCLAGE SUTURE PRESENT IN THIRD TRIMESTER: Primary | ICD-10-CM

## 2017-05-09 PROCEDURE — 76816 OB US FOLLOW-UP PER FETUS: CPT | Mod: 26,S$PBB,, | Performed by: OBSTETRICS & GYNECOLOGY

## 2017-05-09 PROCEDURE — 59871 REMOVE CERCLAGE SUTURE: CPT | Mod: PBBFAC,PN | Performed by: OBSTETRICS & GYNECOLOGY

## 2017-05-09 PROCEDURE — 99212 OFFICE O/P EST SF 10 MIN: CPT | Mod: S$PBB,TH,25, | Performed by: OBSTETRICS & GYNECOLOGY

## 2017-05-09 PROCEDURE — 76819 FETAL BIOPHYS PROFIL W/O NST: CPT | Mod: 26,S$PBB,, | Performed by: OBSTETRICS & GYNECOLOGY

## 2017-05-09 PROCEDURE — 99212 OFFICE O/P EST SF 10 MIN: CPT | Mod: PBBFAC,PN | Performed by: OBSTETRICS & GYNECOLOGY

## 2017-05-09 PROCEDURE — 99999 PR PBB SHADOW E&M-EST. PATIENT-LVL II: CPT | Mod: PBBFAC,,, | Performed by: OBSTETRICS & GYNECOLOGY

## 2017-05-09 NOTE — PROCEDURES
Procedures     After verbal consent obtained  Patient in supine position in brock stirrups  Large graves speculem placed  sture identified at 12 oclock position  Using ring forcep, suture grasped and elevated  Long scissors used to cut edge of stitch  Suture removed intact  Patient tolerated procedure wll  No complications

## 2017-05-10 ENCOUNTER — HOSPITAL ENCOUNTER (OUTPATIENT)
Facility: HOSPITAL | Age: 32
Discharge: HOME | End: 2017-05-10
Attending: OBSTETRICS & GYNECOLOGY | Admitting: OBSTETRICS & GYNECOLOGY
Payer: MEDICAID

## 2017-05-10 VITALS
SYSTOLIC BLOOD PRESSURE: 123 MMHG | HEART RATE: 71 BPM | DIASTOLIC BLOOD PRESSURE: 73 MMHG | BODY MASS INDEX: 26.7 KG/M2 | WEIGHT: 136 LBS | HEIGHT: 60 IN | TEMPERATURE: 98 F | RESPIRATION RATE: 18 BRPM

## 2017-05-10 DIAGNOSIS — N89.8 CLEAR VAGINAL DISCHARGE: ICD-10-CM

## 2017-05-10 PROCEDURE — 59025 FETAL NON-STRESS TEST: CPT | Mod: 26,,, | Performed by: ADVANCED PRACTICE MIDWIFE

## 2017-05-10 PROCEDURE — 99213 OFFICE O/P EST LOW 20 MIN: CPT | Mod: 25,TH,, | Performed by: ADVANCED PRACTICE MIDWIFE

## 2017-05-10 PROCEDURE — 99211 OFF/OP EST MAY X REQ PHY/QHP: CPT | Mod: 25

## 2017-05-10 PROCEDURE — 59025 FETAL NON-STRESS TEST: CPT

## 2017-05-10 RX ORDER — ONDANSETRON 8 MG/1
8 TABLET, ORALLY DISINTEGRATING ORAL EVERY 8 HOURS PRN
Status: DISCONTINUED | OUTPATIENT
Start: 2017-05-10 | End: 2017-05-10 | Stop reason: HOSPADM

## 2017-05-10 RX ORDER — ACETAMINOPHEN 500 MG
500 TABLET ORAL EVERY 6 HOURS PRN
Status: DISCONTINUED | OUTPATIENT
Start: 2017-05-10 | End: 2017-05-10 | Stop reason: HOSPADM

## 2017-05-10 NOTE — IP AVS SNAPSHOT
[facility]  [id number] [place] [address] [address] [place] [address]           Patient Discharge Instructions   Our goal is to set you up for success. This packet includes information on your condition, medications, and your home care.  It will help you care for yourself to prevent having to return to the hospital.     Please ask your nurse if you have any questions.      There are many details to remember when preparing to leave the hospital. Here is what you will need to do:    1. Take your medicine. If you are prescribed medications, review your Medication List on the following pages. You may have new medications to  at the pharmacy and others that you'll need to stop taking. Review the instructions for how and when to take your medications. Talk with your doctor or nurses if you are unsure of what to do.     2. Go to your follow-up appointments. Specific follow-up information is listed in the following pages. Your may be contacted by a nurse or clinical provider about future appointments. Be sure we have all of the phone numbers to reach you. Please contact your provider's office if you are unable to make an appointment.     3. Watch for warning signs. Your doctor or nurse will give you detailed warning signs to watch for and when to call for assistance. These instructions may also include educational information about your condition. If you experience any of warning signs to your health, call your doctor.           [facility]  Unless otherwise directed by your provider, please   contact [facility], our nurse care line   that is available for 24/7 assistance.     [phone] (toll-free)     Registered nurses in the [facility]   provide: appointment scheduling, clinical advisement, health education, and other advisory services.                  ** Verify the list of medication(s) below is accurate and up to date. Carry this with you in case of emergency. If your  medications have changed, please notify your healthcare provider.             Medication List      CONTINUE taking these medications        Additional Info                      PRENATAL 1+1 ORAL   Refills:  0    Instructions:  Take by mouth.     Begin Date    AM    Noon    PM    Bedtime         STOP taking these medications     doxylamine-pyridoxine 10-10 mg Tbec   Commonly known as:  DICLEGIS                  Please bring to all follow up appointments:    1. A copy of your discharge instructions.  2. All medicines you are currently taking in their original bottles.  3. Identification and insurance card.    Please arrive 15 minutes ahead of scheduled appointment time.    Please call 24 hours in advance if you must reschedule your appointment and/or time.        Your Scheduled Appointments     May 16, 2017  9:45 AM CDT   Routine Prenatal Visit with MD [place] [facility] - Obstetrics and Gynecology ([facility])    [address] [place] [id number] [phone]              Follow-up Information     Follow up In 1 week.          Discharge Instructions        Discharge Instructions    Self Care Instructions:    Diet:  · Eat from the five basic food groups  · Fruits and proteins are good choices  · Limit fast foods and added salt/sugar  · Moderate carbonated and caffeine drinks    Hydration:  · Drink at least 8 large glasses of water a day    Kick Counts:  · After a meal, rest on your side and note the baby's movements until you have 8-10 movements in a 2 hour counting period.    · If you do not feel your baby move 8-10 times within 2 hours or you sense a change in the type or character of the baby's movement, you should come in to the hospital at once.  · Remember; your baby can sleep for 20-40 minutes at a time.      When to notify your provider:    · Vaginal bleeding like a period;  You may spot if we examined your cervix.  · If your water breaks, come to the birth center.  Note  time, color and odor.  · Abdominal tenderness or pain that does not go away  · Contractions every 3 to 5 minutes for 1 to 2 hours.  True contractions move from front to back, are regular; usually get longer, stronger and closer together and do not stop if you change your position or activity.  · Any burning, urgency or frequency in relation to emptying your bladder.  · Any temperature greater than 100.4 degrees, chills, flu-like symptoms            Primary Diagnosis     Your primary diagnosis was:  Clear Vaginal Discharge      Admission Information     Date & Time Provider Department CSN    5/10/2017  7:38 PM Keila Lema MD [facility] -  [id number]      Care Providers     Provider Role Specialty Primary office phone    Keila Lema MD Attending Provider Obstetrics and Gynecology [phone]      Your Vitals Were     Last Period                   09/08/2016           Recent Lab Values     No lab values to display.      Allergies as of 5/10/2017     No Known Allergies      Advance Directives     An advance directive is a document which, in the event you are no longer able to make decisions for yourself, tells your healthcare team what kind of treatment you do or do not want to receive, or who you would like to make those decisions for you.  If you do not currently have an advance directive, [facility] encourages you to create one.  For more information call:  [phone]), [phone] ([phone]),  or log on to [email or web address].        Language Assistance Services     ATTENTION: Language assistance services are available, free of charge. Please call [phone].      ATENCIÓN: Si habla español, tiene a diallo disposición servicios gratuitos de asistencia lingüística. Llame al [phone].     [facility] Ý: N?u b?n nói Ti?ng Vi?t, có các d?ch v? h? tr? ngôn ng? mi?n phí dành cho b?n. G?i s? [phone].        [facility] Sign-Up     Activating your [facility] account is as easy  as 1-2-3!     1) Visit [email or web address], select Sign Up Now, enter this activation code and your date of birth, then select Next.  [email or web address]  Expires: 6/24/2017  8:15 PM      2) Create a username and password to use when you visit [facility] in the future and select a security question in case you lose your password and select Next.    3) Enter your e-mail address and click Sign Up!    Additional Information  If you have questions, please e-mail [email or web address] or call [phone] to talk to our [facility] staff. Remember, [facility] is NOT to be used for urgent needs. For medical emergencies, dial 911.          [facility] complies with applicable Federal civil rights laws and does not discriminate on the basis of race, color, national origin, age, disability, or sex.

## 2017-05-11 NOTE — DISCHARGE SUMMARY
[facility]  Obstetrics  Discharge Summary      Patient Name: Ebony Rodriguez  MRN: [id number]  Admission Date: 5/10/2017  Hospital Length of Stay: 0 days  Discharge Date and Time:  05/10/2017 8:17 PM  Attending Physician: Keila Lema MD   Discharging Provider: Jazmyne New CNM  Primary Care Provider: Primary Doctor No    HPI:  IUP at 36w6d cerclage removed yesterday    * No surgery found *     Hospital Course:   Patient here c/o gush of clear fluid  nitrazine pool negative          Final Active Diagnoses:    Diagnosis Date Noted POA    PRINCIPAL PROBLEM:  Clear vaginal discharge [N89.8] 05/10/2017 Yes      Problems Resolved During this Admission:    Diagnosis Date Noted Date Resolved POA            Feeding Method: breast    Immunizations     None          This patient has no babies on file.  Pending Diagnostic Studies:     None          Discharged Condition: good    Disposition:     Follow Up:  Follow-up Information     Follow up In 1 week.        Patient Instructions:   No discharge procedures on file.  Medications:  Current Discharge Medication List      CONTINUE these medications which have NOT CHANGED    Details   PRENATAL VIT/IRON FUMARATE/FA (PRENATAL 1+1 ORAL) Take by mouth.         STOP taking these medications       doxylamine-pyridoxine (DICLEGIS) 10-10 mg TbEC Comments:   Reason for Stopping:               Jazmyne New CNM  Obstetrics  [facility]

## 2017-05-11 NOTE — SUBJECTIVE & OBJECTIVE
Obstetric HPI:  Patient reports None contractions, active fetal movement, No vaginal bleeding , Yes loss of fluid     This pregnancy has been complicated by cervical incompetence    Obstetric History       T2      TAB0   SAB2   E0   M0   L4       # Outcome Date GA Lbr Andres/2nd Weight Sex Delivery Anes PTL Lv   7 Current            6 Term 14 38w0d   M Vag-Spont   Y      Complications: Cervical cerclage suture present   5 Term 13 38w0d   M Vag-Spont   Y      Complications: Cervical cerclage suture present   4 SAB 12 20w0d   F SAB   ND   3  10/31/09 36w0d   F Vag-Spont   Y      Complications: Cervical cerclage suture present   2  05 36w0d   F Vag-Spont   Y      Complications: Cervical cerclage suture present   1 SAB 00 20w0d   M    ND        Past Medical History:   Diagnosis Date    Anemia     H/O cervical cerclage, currently pregnant     Miscarriage      Past Surgical History:   Procedure Laterality Date    CERVICAL CERCLAGE         Facility-Administered Medications Prior to Admission   Medication    JUNIE Oil 250 mg    JUNIE Oil 250 mg    JUNIE Oil 250 mg     PTA Medications   Medication Sig    doxylamine-pyridoxine (DICLEGIS) 10-10 mg TbEC Take as directed.    PRENATAL VIT/IRON FUMARATE/FA (PRENATAL 1+1 ORAL) Take by mouth.       Review of patient's allergies indicates:  No Known Allergies     Family History     Problem Relation (Age of Onset)    Diabetes Mother    Heart failure Father        Social History Main Topics    Smoking status: Never Smoker    Smokeless tobacco: Not on file    Alcohol use No    Drug use: No    Sexual activity: Not Currently     Partners: Male     Review of Systems   Constitutional: Negative.    HENT: Negative.    Eyes: Negative.    Respiratory: Negative.    Cardiovascular: Negative.    Gastrointestinal: Negative.    Endocrine: Negative.    Genitourinary: Negative.    Musculoskeletal: Negative.    Skin:  Negative.    Neurological: Negative.    Hematological: Negative.    Psychiatric/Behavioral: Negative.    Breast: negative.    All other systems reviewed and are negative.     Objective:     Vital Signs (Most Recent):    Vital Signs (24h Range):           There is no height or weight on file to calculate BMI.    FHT: 138Cat 1 (reassuring)  TOCO:  Q no contractions minutes    Physical Exam    Cervix:  Dilation:  0  Effacement:  25%  Station: -1  Presentation: Vertex     Significant Labs:  Lab Results   Component Value Date    GROUPTRH AB POS 11/18/2016    HEPBSAG Negative 11/18/2016    STREPBCULT No Group B Streptococcus isolated 05/02/2017       I have personallly reviewed all pertinent lab results from the last 24 hours.

## 2017-05-16 ENCOUNTER — ROUTINE PRENATAL (OUTPATIENT)
Dept: OBSTETRICS AND GYNECOLOGY | Facility: CLINIC | Age: 32
End: 2017-05-16
Payer: MEDICAID

## 2017-05-16 ENCOUNTER — HOSPITAL ENCOUNTER (OUTPATIENT)
Facility: HOSPITAL | Age: 32
LOS: 1 days | Discharge: HOME | End: 2017-05-17
Attending: OBSTETRICS & GYNECOLOGY | Admitting: OBSTETRICS & GYNECOLOGY
Payer: MEDICAID

## 2017-05-16 VITALS
DIASTOLIC BLOOD PRESSURE: 64 MMHG | WEIGHT: 134.69 LBS | BODY MASS INDEX: 26.31 KG/M2 | SYSTOLIC BLOOD PRESSURE: 110 MMHG

## 2017-05-16 DIAGNOSIS — O47.03 THREATENED PREMATURE LABOR IN THIRD TRIMESTER: Primary | ICD-10-CM

## 2017-05-16 DIAGNOSIS — O09.293: Primary | ICD-10-CM

## 2017-05-16 DIAGNOSIS — O47.9 THREATENED LABOR AT TERM: ICD-10-CM

## 2017-05-16 DIAGNOSIS — O60.00 PRETERM LABOR: ICD-10-CM

## 2017-05-16 LAB
BASOPHILS # BLD AUTO: 0.01 K/UL
BASOPHILS NFR BLD: 0.1 %
DIFFERENTIAL METHOD: ABNORMAL
EOSINOPHIL # BLD AUTO: 0 K/UL
EOSINOPHIL NFR BLD: 0.1 %
ERYTHROCYTE [DISTWIDTH] IN BLOOD BY AUTOMATED COUNT: 13.2 %
HCT VFR BLD AUTO: 33 %
HGB BLD-MCNC: 11.5 G/DL
LYMPHOCYTES # BLD AUTO: 1.4 K/UL
LYMPHOCYTES NFR BLD: 15.3 %
MCH RBC QN AUTO: 30.7 PG
MCHC RBC AUTO-ENTMCNC: 34.8 %
MCV RBC AUTO: 88 FL
MONOCYTES # BLD AUTO: 0.5 K/UL
MONOCYTES NFR BLD: 6 %
NEUTROPHILS # BLD AUTO: 7 K/UL
NEUTROPHILS NFR BLD: 78.5 %
PLATELET # BLD AUTO: 104 K/UL
PMV BLD AUTO: 9.7 FL
RBC # BLD AUTO: 3.75 M/UL
WBC # BLD AUTO: 8.94 K/UL

## 2017-05-16 PROCEDURE — 85025 COMPLETE CBC W/AUTO DIFF WBC: CPT

## 2017-05-16 PROCEDURE — 99211 OFF/OP EST MAY X REQ PHY/QHP: CPT | Mod: 25,27

## 2017-05-16 PROCEDURE — 86900 BLOOD TYPING SEROLOGIC ABO: CPT

## 2017-05-16 PROCEDURE — G0378 HOSPITAL OBSERVATION PER HR: HCPCS

## 2017-05-16 PROCEDURE — 99999 PR PBB SHADOW E&M-EST. PATIENT-LVL II: CPT | Mod: PBBFAC,,, | Performed by: OBSTETRICS & GYNECOLOGY

## 2017-05-16 PROCEDURE — 99213 OFFICE O/P EST LOW 20 MIN: CPT | Mod: TH,S$PBB,, | Performed by: OBSTETRICS & GYNECOLOGY

## 2017-05-16 PROCEDURE — 59025 FETAL NON-STRESS TEST: CPT

## 2017-05-16 PROCEDURE — 86850 RBC ANTIBODY SCREEN: CPT

## 2017-05-16 RX ORDER — ACETAMINOPHEN 500 MG
500 TABLET ORAL EVERY 6 HOURS PRN
Status: DISCONTINUED | OUTPATIENT
Start: 2017-05-16 | End: 2017-05-17 | Stop reason: HOSPADM

## 2017-05-16 RX ORDER — ONDANSETRON 8 MG/1
8 TABLET, ORALLY DISINTEGRATING ORAL EVERY 8 HOURS PRN
Status: DISCONTINUED | OUTPATIENT
Start: 2017-05-16 | End: 2017-05-17 | Stop reason: HOSPADM

## 2017-05-16 NOTE — MR AVS SNAPSHOT
[facility]stetric[facility] Gyne[facility]  [address]  [place] [address]  Phone: [phone]                  Ebony Rodriguez   2017 9:45 AM   Routine Prenatal    Description:  Female : 1985   Provider:  Keila Lema MD   Department:  [facility] Obstetrics and Gynecology           Reason for Visit     Routine Prenatal Visit                To Do List           Future Appointments        Provider Department Dept Phone    2017 9:45 AM Keila Lema MD [facility] Ob[facility]etric[facility] Gynecology [phone]    2017 10:00 AM Keila Lema MD [facility]ne[facility] [phone]      Goals (5 Years of Data)     None      [facility]     [facility] On Call Nurse Care Line -  Assistance  Unless otherwise directed by your provider, please contact [facility], our nurse care line that is available for  assistance.     Registered nurses in the [facility] On Call Center provide: appointment scheduling, clinical advisement, health education, and other advisory services.  Call: [phone] (toll free)               Medications           Message regarding Medications     Verify the changes and/or additions to your medication regime listed below are the same as discussed with your clinician today.  If any of these changes or additions are incorrect, please notify your healthcare provider.             Verify that the below list of medications is an accurate representation of the medications you are currently taking.  If none reported, the list may be blank. If incorrect, please contact your healthcare provider. Carry this list with you in case of emergency.           Current Medications     PRENATAL VIT/IRON FUMARATE/FA (PRENATAL 1+1 ORAL) Take by mouth.           Clinical Reference Information           Prenatal Vitals     Enc. Date GA Prenatal Vitals Prenatal Pulse Pain Level Urine Albumin/Glucose Edema Presentation Dilation/Effacement/Station    17 37w5d  110/64 / 61.1 kg (134 lb 11.2 oz)  / 143 / Present  0   Vertex 2 / 80 / -1    5/10/17 36w6d Admission Dx: Clear vaginal discharge Dept: [facility]    5/9/17 36w5d 108/69 / 61.7 kg (136 lb 0.4 oz)  / us 152 / Present  0        5/2/17 35w5d 108/68 / 59 kg (130 lb)  / 136 / Present          4/25/17 34w5d 113/70 / 59 kg (130 lb) 34 cm / 148 / Present          4/18/17 33w5d 108/70 / 59 kg (130 lb) 33 cm / 142 / Present    None / None / None / No Vertex 0 / 70 / 0    4/12/17 32w6d Admission Dept: [facility]    4/4/17 31w5d 108/64 / 58 kg (127 lb 13.9 oz) 30 cm / 158 / Present  0  None / None / None / No Vertex     3/21/17 29w5d 108/70 / 60.8 kg (134 lb) 29 cm / 140 / Present  2  None / None / None / No Vertex     2/28/17 26w5d 96/60 / 56.3 kg (124 lb 1.9 oz)  / 140 / Present  0  None / None / None / No      2/14/17 24w5d 116/70 / 55.6 kg (122 lb 9.2 oz)  /  / Present  0        1/31/17 22w5d 110/66 / 54 kg (119 lb 0.8 oz) 22 cm / 142 / Present  0        1/17/17 20w5d 102/70 / 53.1 kg (117 lb 1 oz) 20 cm / us 163 / Present  0 Negative / Negative None / None / None / No      1/3/17 18w5d 102/64 / 53.7 kg (118 lb 6.2 oz) 18 cm / 145 / Present  0 Negative / Negative None / None / None / No      12/20/16 16w5d 100/42 (A) / 50.7 kg (111 lb 12.4 oz)  / 133 u/s  / Absent  0        12/13/16 15w5d 108/72 / 51.5 kg (113 lb 8.6 oz)           12/6/16 14w5d 97/66 / 50.8 kg (111 lb 15.9 oz)  / 147 / Absent  0 Negative / Negative       12/1/16 14w0d 100/60 / 50.8 kg (111 lb 15.9 oz)  / 144 / Absent  0  None / None / None / No      11/29/16 13w5d 112/72 / 50.7 kg (111 lb 12.4 oz)  / 154 / Absent  0 Negative / Negative       11/25/16 13w1d 98/68 / 51 kg (112 lb 7 oz) 13 cm / 150 / Absent  0 2+ / Negative None / None / None / No         TWG: 10.1 kg (22 lb 4.3 oz)   Pregravid weight: 51 kg (112 lb 7 oz)   Number of fetuses: 1   Height: 5' (1.524 m)   BMI: 22.0       Your Vitals Were     BP Weight Last Period BMI       110/64 61.1 kg (134  lb 11.2 oz) 09/08/2016 26.31 kg/m2       Allergies as of 5/16/2017     No Known Allergies      Immunizations Administered on Date of Encounter - 5/16/2017     None      [facility] Sign-Up     Activating your [facility] account is as easy as 1-2-3!     1) Visit [email or web address], select Sign Up Now, enter this activation code and your date of birth, then select Next.  [email or web address]  Expires: 6/24/2017  8:15 PM      2) Create a username and password to use when you visit [facility] in the future and select a security question in case you lose your password and select Next.    3) Enter your e-mail address and click Sign Up!    Additional Information  If you have questions, please e-mail [email or web address] or call [phone] to talk to our [facility] staff. Remember, [facility] is NOT to be used for urgent needs. For medical emergencies, dial 911.         Language Assistance Services     ATTENTION: Language assistance services are available, free of charge. Please call [phone].      ATENCIÓN: Si habla español, tiene a diallo disposición servicios gratuitos de asistencia lingüística. Llame al [phone].     CHYNA Ý: N?u b?n nói Ti?ng Vi?t, có các d?ch v? h? tr? ngôn ng? mi?n phí dành cho b?n. G?i s? [phone].         [facility] complies with applicable Federal civil rights laws and does not discriminate on the basis of race, color, national origin, age, disability, or sex.

## 2017-05-16 NOTE — SUBJECTIVE & OBJECTIVE
Obstetric HPI:  Patient reports Frequency: Every 5-8 minutes contractions, active fetal movement, No vaginal bleeding , No loss of fluid     This pregnancy has been complicated by incompetent cervix     Obstetric History       T2      TAB0   SAB2   E0   M0   L4       # Outcome Date GA Lbr Andres/2nd Weight Sex Delivery Anes PTL Lv   7 Current            6 Term 14 38w0d   M Vag-Spont   Y      Complications: Cervical cerclage suture present   5 Term 13 38w0d   M Vag-Spont   Y      Complications: Cervical cerclage suture present   4 SAB 12 20w0d   F SAB   ND   3  10/31/09 36w0d   F Vag-Spont   Y      Complications: Cervical cerclage suture present   2  05 36w0d   F Vag-Spont   Y      Complications: Cervical cerclage suture present   1 SAB 00 20w0d   M    ND        Past Medical History:   Diagnosis Date    Anemia     H/O cervical cerclage, currently pregnant     Miscarriage      Past Surgical History:   Procedure Laterality Date    CERVICAL CERCLAGE         PTA Medications   Medication Sig    PRENATAL VIT/IRON FUMARATE/FA (PRENATAL 1+1 ORAL) Take by mouth.       Review of patient's allergies indicates:  No Known Allergies     Family History     Problem Relation (Age of Onset)    Diabetes Mother    Heart failure Father        Social History Main Topics    Smoking status: Never Smoker    Smokeless tobacco: Not on file    Alcohol use No    Drug use: No    Sexual activity: Not Currently     Partners: Male     Review of Systems   Constitutional: Negative.    Respiratory: Negative.    Cardiovascular: Negative.    Gastrointestinal: Negative.    Musculoskeletal: Negative.    Skin:  Negative.   Psychiatric/Behavioral: Negative.       Objective:     Vital Signs (Most Recent):  Temp: 98.6 °F (37 °C) (17)  Pulse: 100 (17)  Resp: 18 (17)  BP: 114/86 (17) Vital Signs (24h Range):  Temp:  [98.6 °F (37 °C)] 98.6 °F (37  °C)  Pulse:  [100] 100  Resp:  [18] 18  BP: (110-114)/(64-86) 114/86     Weight: 60.8 kg (134 lb)  Body mass index is 26.17 kg/(m^2).    FHT: 135 bpm Cat 1 (reassuring)  TOCO:  Q 2-5 minutes    Physical Exam:   Constitutional: She is oriented to person, place, and time. She appears well-developed and well-nourished. No distress.    HENT:   Head: Normocephalic.      Cardiovascular: Normal rate, regular rhythm and normal heart sounds.     Pulmonary/Chest: Effort normal and breath sounds normal.        Abdominal: Soft. Bowel sounds are normal.     Genitourinary: Vagina normal.           Musculoskeletal: Normal range of motion and moves all extremeties.       Neurological: She is alert and oriented to person, place, and time. She has normal reflexes.    Skin: Skin is warm and dry.    Psychiatric: She has a normal mood and affect. Her behavior is normal. Judgment and thought content normal.       Cervix:  Dilation:  4  Effacement:  70%  Station: -2  Presentation: Vertex     Significant Labs:  Lab Results   Component Value Date    GROUPTRH AB POS 11/18/2016    HEPBSAG Negative 11/18/2016    STREPBCULT No Group B Streptococcus isolated 05/02/2017       None

## 2017-05-16 NOTE — IP AVS SNAPSHOT
[facility]  [id number] [place] [address] [address] [place] [id number]           Patient Discharge Instructions   Our goal is to set you up for success. This packet includes information on your condition, medications, and your home care.  It will help you care for yourself to prevent having to return to the hospital.     Please ask your nurse if you have any questions.      There are many details to remember when preparing to leave the hospital. Here is what you will need to do:    1. Take your medicine. If you are prescribed medications, review your Medication List on the following pages. You may have new medications to  at the pharmacy and others that you'll need to stop taking. Review the instructions for how and when to take your medications. Talk with your doctor or nurses if you are unsure of what to do.     2. Go to your follow-up appointments. Specific follow-up information is listed in the following pages. Your may be contacted by a nurse or clinical provider about future appointments. Be sure we have all of the phone numbers to reach you. Please contact your provider's office if you are unable to make an appointment.     3. Watch for warning signs. Your doctor or nurse will give you detailed warning signs to watch for and when to call for assistance. These instructions may also include educational information about your condition. If you experience any of warning signs to your health, call your doctor.           [facility]  Unless otherwise directed by your provider, please   contact [facility], our nurse care line   that is available for 24/7 assistance.     [phone] (toll-free)     Registered nurses in the [facility]   provide: appointment scheduling, clinical advisement, health education, and other advisory services.                  ** Verify the list of medication(s) below is accurate and up to date. Carry this with you in case of emergency. If your  medications have changed, please notify your healthcare provider.             Medication List      CONTINUE taking these medications        Additional Info                      PRENATAL 1+1 ORAL   Refills:  0    Instructions:  Take by mouth.     Begin Date    AM    Noon    PM    Bedtime                  Please bring to all follow up appointments:    1. A copy of your discharge instructions.  2. All medicines you are currently taking in their original bottles.  3. Identification and insurance card.    Please arrive 15 minutes ahead of scheduled appointment time.    Please call 24 hours in advance if you must reschedule your appointment and/or time.        Your Scheduled Appointments     May 23, 2017 10:00 AM CDT   Routine Prenatal Visit with Keila Lema MD   [facility] ([facility])    [address] [place] [id number] [phone]              Follow-up Information     Follow up with Keila Lema MD On 2017.    Specialty:  Obstetrics and Gynecology    Why:  routine OB follow-up    Contact information:    [address] [place] [id number] [phone]          Discharge Instructions     Future Orders    Activity as tolerated     Diet general     Questions:    Total calories:      Fat restriction, if any:      Protein restriction, if any:      Na restriction, if any:      Fluid restriction:      Additional restrictions:          Discharge Instructions        Discharge Instructions    Self Care Instructions:    Diet:  · Eat from the five basic food groups  · Fruits and proteins are good choices  · Limit fast foods and added salt/sugar  · Moderate carbonated and caffeine drinks    Hydration:  · Drink at least 8 large glasses of water a day    Kick Counts:  · After a meal, rest on your side and note the baby's movements until you have 8-10 movements in a 2 hour counting period.    · If you do not feel your baby move 8-10 times within 2 hours or you sense a  change in the type or character of the baby's movement, you should come in to the hospital at once.  · Remember; your baby can sleep for 20-40 minutes at a time.      When to notify your provider:    · Vaginal bleeding like a period;  You may spot if we examined your cervix.  · If your water breaks, come to the birth center.  Note time, color and odor.  · Abdominal tenderness or pain that does not go away  · Contractions every 3 to 5 minutes for 1 to 2 hours.  True contractions move from front to back, are regular; usually get longer, stronger and closer together and do not stop if you change your position or activity.  · Any burning, urgency or frequency in relation to emptying your bladder.  · Any temperature greater than 100.4 degrees, chills, flu-like symptoms          Primary Diagnosis     Your primary diagnosis was:  Threatened Premature Labor In Third Trimester      Admission Information     Date & Time Provider Department CSN    5/16/2017  5:28 PM Keila Lema MD [facility] -  [id number]      Care Providers     Provider Role Specialty Primary office phone    Keila Lema MD Attending Provider Obstetrics and Gynecology [phone]      Your Vitals Were     BP Pulse Temp Resp Height Weight    113/80 92 98.2 °F (36.8 °C) (Oral) 16 5' (1.524 m) 60.8 kg (134 lb)    Last Period BMI             09/08/2016 26.17 kg/m2         Recent Lab Values     No lab values to display.      Allergies as of 5/17/2017     No Known Allergies      Advance Directives     An advance directive is a document which, in the event you are no longer able to make decisions for yourself, tells your healthcare team what kind of treatment you do or do not want to receive, or who you would like to make those decisions for you.  If you do not currently have an advance directive, [facility] encourages you to create one.  For more information call:  [phone]), [phone] ([phone]),  or log on to  [email or web address].        Language Assistance Services     ATTENTION: Language assistance services are available, free of charge. Please call [phone].      ATENCIÓN: Si mack lopez, tiene a diallo disposición servicios gratuitos de asistencia lingüística. Llame al [phone].     CHÚ Ý: N?u b?n nói Ti?ng Vi?t, có các d?ch v? h? tr? ngôn ng? mi?n phí dành cho b?n. G?i s? [phone].        [facility] Sign-Up     Activating your [facility] account is as easy as 1-2-3!     1) Visit [email or web address], select Sign Up Now, enter this activation code and your date of birth, then select Next.  [email or web address]  Expires: 6/24/2017  8:15 PM      2) Create a username and password to use when you visit [facility] in the future and select a security question in case you lose your password and select Next.    3) Enter your e-mail address and click Sign Up!    Additional Information  If you have questions, please e-mail [email or web address] or call [phone] to talk to our [facility] staff. Remember, [facility] is NOT to be used for urgent needs. For medical emergencies, dial 911.          [facility] complies with applicable Federal civil rights laws and does not discriminate on the basis of race, color, national origin, age, disability, or sex.

## 2017-05-16 NOTE — SUBJECTIVE & OBJECTIVE
Obstetric HPI:  Patient reports Date/time of onset: 2017 contractions, active fetal movement, No vaginal bleeding , No loss of fluid     This pregnancy has been complicated by incompetent cervix     Obstetric History       T2      TAB0   SAB2   E0   M0   L4       # Outcome Date GA Lbr Andres/2nd Weight Sex Delivery Anes PTL Lv   7 Current            6 Term 14 38w0d   M Vag-Spont   Y      Complications: Cervical cerclage suture present   5 Term 13 38w0d   M Vag-Spont   Y      Complications: Cervical cerclage suture present   4 SAB 12 20w0d   F SAB   ND   3  10/31/09 36w0d   F Vag-Spont   Y      Complications: Cervical cerclage suture present   2  05 36w0d   F Vag-Spont   Y      Complications: Cervical cerclage suture present   1 SAB 00 20w0d   M    ND        Past Medical History:   Diagnosis Date    Anemia     H/O cervical cerclage, currently pregnant     Miscarriage      Past Surgical History:   Procedure Laterality Date    CERVICAL CERCLAGE         PTA Medications   Medication Sig    PRENATAL VIT/IRON FUMARATE/FA (PRENATAL 1+1 ORAL) Take by mouth.       Review of patient's allergies indicates:  No Known Allergies     Family History     Problem Relation (Age of Onset)    Diabetes Mother    Heart failure Father        Social History Main Topics    Smoking status: Never Smoker    Smokeless tobacco: Not on file    Alcohol use No    Drug use: No    Sexual activity: Not Currently     Partners: Male     Review of Systems   Objective:     Vital Signs (Most Recent):  Temp: 98.6 °F (37 °C) (17 174)  Pulse: 100 (17 174)  Resp: 18 (17)  BP: 114/86 (17) Vital Signs (24h Range):  Temp:  [98.6 °F (37 °C)] 98.6 °F (37 °C)  Pulse:  [100] 100  Resp:  [18] 18  BP: (110-114)/(64-86) 114/86     Weight: 60.8 kg (134 lb)  Body mass index is 26.17 kg/(m^2).    FHT: 130 bpm Cat 1 (reassuring)  TOCO:  Q *** minutes    Physical  "Exam    Cervix:  Dilation:  {Select Dilation:73234}  Effacement:  {Select Effacement:30725}  Station: {Select Station:55611}  Presentation: {Select Presentation:57752}     Significant Labs:  Lab Results   Component Value Date    GROUPTRH AB POS 11/18/2016    HEPBSAG Negative 11/18/2016    STREPBCULT No Group B Streptococcus isolated 05/02/2017       {Results:59724::"I have personallly reviewed all pertinent lab results from the last 24 hours."}  "

## 2017-05-16 NOTE — PROGRESS NOTES
+fetal movement, no srom, no vag bleeding  C/o increased pressure; reports intermittent contractions  Labor precautions reviewed; kick counts reviwed;   plans breast

## 2017-05-16 NOTE — H&P
[facility] -   Obstetrics  History & Physical    Patient Name: Ebony Rodriguez  MRN: [id number]  Admission Date: 2017  Primary Care Provider: Primary Doctor No    Subjective:     Principal Problem:<principal problem not specified>    History of Present Illness:  Seen in office today, c/o contractions and vaginal discharge. No leaking of fluid.     Obstetric HPI:  Patient reports Frequency: Every 5-8 minutes contractions, active fetal movement, No vaginal bleeding , No loss of fluid     This pregnancy has been complicated by incompetent cervix     Obstetric History       T2      TAB0   SAB2   E0   M0   L4       # Outcome Date GA Lbr Andres/2nd Weight Sex Delivery Anes PTL Lv   7 Current            6 Term 14 38w0d   M Vag-Spont   Y      Complications: Cervical cerclage suture present   5 Term 13 38w0d   M Vag-Spont   Y      Complications: Cervical cerclage suture present   4 SAB 12 20w0d   F SAB   ND   3  10/31/09 36w0d   F Vag-Spont   Y      Complications: Cervical cerclage suture present   2  05 36w0d   F Vag-Spont   Y      Complications: Cervical cerclage suture present   1 SAB 00 20w0d   M    ND        Past Medical History:   Diagnosis Date    Anemia     H/O cervical cerclage, currently pregnant     Miscarriage      Past Surgical History:   Procedure Laterality Date    CERVICAL CERCLAGE         PTA Medications   Medication Sig    PRENATAL VIT/IRON FUMARATE/FA (PRENATAL 1+1 ORAL) Take by mouth.       Review of patient's allergies indicates:  No Known Allergies     Family History     Problem Relation (Age of Onset)    Diabetes Mother    Heart failure Father        Social History Main Topics    Smoking status: Never Smoker    Smokeless tobacco: Not on file    Alcohol use No    Drug use: No    Sexual activity: Not Currently     Partners: Male     Review of Systems   Constitutional: Negative.    Respiratory: Negative.     Cardiovascular: Negative.    Gastrointestinal: Negative.    Musculoskeletal: Negative.    Skin:  Negative.   Psychiatric/Behavioral: Negative.       Objective:     Vital Signs (Most Recent):  Temp: 98.6 °F (37 °C) (17)  Pulse: 100 (17)  Resp: 18 (17)  BP: 114/86 (17) Vital Signs (24h Range):  Temp:  [98.6 °F (37 °C)] 98.6 °F (37 °C)  Pulse:  [100] 100  Resp:  [18] 18  BP: (110-114)/(64-86) 114/86     Weight: 60.8 kg (134 lb)  Body mass index is 26.17 kg/(m^2).    FHT: 135 bpm Cat 1 (reassuring)  TOCO:  Q 2-5 minutes    Physical Exam:   Constitutional: She is oriented to person, place, and time. She appears well-developed and well-nourished. No distress.    HENT:   Head: Normocephalic.      Cardiovascular: Normal rate, regular rhythm and normal heart sounds.     Pulmonary/Chest: Effort normal and breath sounds normal.        Abdominal: Soft. Bowel sounds are normal.     Genitourinary: Vagina normal.           Musculoskeletal: Normal range of motion and moves all extremeties.       Neurological: She is alert and oriented to person, place, and time. She has normal reflexes.    Skin: Skin is warm and dry.    Psychiatric: She has a normal mood and affect. Her behavior is normal. Judgment and thought content normal.       Cervix:  Dilation:  4  Effacement:  70%  Station: -2  Presentation: Vertex     Significant Labs:  Lab Results   Component Value Date    GROUPTRH AB POS 2016    HEPBSAG Negative 2016    STREPBCULT No Group B Streptococcus isolated 2017       None    Assessment/Plan:     32 y.o. female  at 37w5d for:    Threatened labor at term  NST X 20 min  Observation X 2 hours, repeat SVE to assess for cervical change       Sharon Lema CNM  Obstetrics  [facility]

## 2017-05-17 VITALS
RESPIRATION RATE: 16 BRPM | WEIGHT: 134 LBS | DIASTOLIC BLOOD PRESSURE: 80 MMHG | TEMPERATURE: 98 F | BODY MASS INDEX: 26.31 KG/M2 | HEART RATE: 92 BPM | SYSTOLIC BLOOD PRESSURE: 113 MMHG | HEIGHT: 60 IN

## 2017-05-17 PROBLEM — O47.03 THREATENED PREMATURE LABOR IN THIRD TRIMESTER: Status: RESOLVED | Noted: 2017-05-17 | Resolved: 2017-05-17

## 2017-05-17 PROBLEM — O47.03 THREATENED PREMATURE LABOR IN THIRD TRIMESTER: Status: RESOLVED | Noted: 2017-05-16 | Resolved: 2017-05-17

## 2017-05-17 PROBLEM — O47.03 THREATENED PREMATURE LABOR IN THIRD TRIMESTER: Status: ACTIVE | Noted: 2017-05-17

## 2017-05-17 PROBLEM — O47.03 THREATENED PREMATURE LABOR IN THIRD TRIMESTER: Status: ACTIVE | Noted: 2017-05-16

## 2017-05-17 PROBLEM — N89.8 CLEAR VAGINAL DISCHARGE: Status: RESOLVED | Noted: 2017-05-10 | Resolved: 2017-05-17

## 2017-05-17 PROBLEM — O60.00 PRETERM LABOR: Status: ACTIVE | Noted: 2017-05-16

## 2017-05-17 PROBLEM — Z34.93 THIRD TRIMESTER PREGNANCY AT LESS THAN 36 WEEKS: Status: RESOLVED | Noted: 2017-04-12 | Resolved: 2017-05-17

## 2017-05-17 LAB
ABO + RH BLD: NORMAL
BLD GP AB SCN CELLS X3 SERPL QL: NORMAL

## 2017-05-17 PROCEDURE — 99213 OFFICE O/P EST LOW 20 MIN: CPT | Mod: 25,TH,, | Performed by: ADVANCED PRACTICE MIDWIFE

## 2017-05-17 PROCEDURE — 72100002 HC LABOR CARE, 1ST 8 HOURS

## 2017-05-17 PROCEDURE — G0378 HOSPITAL OBSERVATION PER HR: HCPCS

## 2017-05-17 PROCEDURE — 59025 FETAL NON-STRESS TEST: CPT | Mod: 26,,, | Performed by: ADVANCED PRACTICE MIDWIFE

## 2017-05-17 RX ORDER — SODIUM CHLORIDE, SODIUM LACTATE, POTASSIUM CHLORIDE, CALCIUM CHLORIDE 600; 310; 30; 20 MG/100ML; MG/100ML; MG/100ML; MG/100ML
INJECTION, SOLUTION INTRAVENOUS CONTINUOUS
Status: DISCONTINUED | OUTPATIENT
Start: 2017-05-17 | End: 2017-05-17 | Stop reason: HOSPADM

## 2017-05-17 RX ORDER — CARBOPROST TROMETHAMINE 250 UG/ML
250 INJECTION, SOLUTION INTRAMUSCULAR
Status: DISCONTINUED | OUTPATIENT
Start: 2017-05-17 | End: 2017-05-17 | Stop reason: HOSPADM

## 2017-05-17 RX ORDER — ONDANSETRON 8 MG/1
8 TABLET, ORALLY DISINTEGRATING ORAL EVERY 8 HOURS PRN
Status: DISCONTINUED | OUTPATIENT
Start: 2017-05-17 | End: 2017-05-17 | Stop reason: HOSPADM

## 2017-05-17 RX ORDER — OXYTOCIN/RINGER'S LACTATE 20/1000 ML
20 PLASTIC BAG, INJECTION (ML) INTRAVENOUS ONCE
Status: DISCONTINUED | OUTPATIENT
Start: 2017-05-17 | End: 2017-05-17 | Stop reason: HOSPADM

## 2017-05-17 RX ORDER — MISOPROSTOL 200 UG/1
200 TABLET ORAL
Status: DISCONTINUED | OUTPATIENT
Start: 2017-05-17 | End: 2017-05-17 | Stop reason: HOSPADM

## 2017-05-17 RX ORDER — OXYTOCIN/RINGER'S LACTATE 20/1000 ML
41.65 PLASTIC BAG, INJECTION (ML) INTRAVENOUS CONTINUOUS
Status: DISCONTINUED | OUTPATIENT
Start: 2017-05-17 | End: 2017-05-17 | Stop reason: HOSPADM

## 2017-05-17 RX ORDER — METHYLERGONOVINE MALEATE 0.2 MG/ML
200 INJECTION INTRAVENOUS
Status: DISCONTINUED | OUTPATIENT
Start: 2017-05-17 | End: 2017-05-17 | Stop reason: HOSPADM

## 2017-05-17 NOTE — SUBJECTIVE & OBJECTIVE
Interval History:  Ebony is a 32 y.o.  at 37w5d. She is doing well. Reports occasional pressure, but denies pain.     Objective:     Vital Signs (Most Recent):  Temp: 98.6 °F (37 °C) (17)  Pulse: 100 (17 174)  Resp: 18 (17)  BP: 114/86 (17) Vital Signs (24h Range):  Temp:  [98.6 °F (37 °C)] 98.6 °F (37 °C)  Pulse:  [100] 100  Resp:  [18] 18  BP: (110-114)/(64-86) 114/86     Weight: 60.8 kg (134 lb)  Body mass index is 26.17 kg/(m^2).    FHT: 125 Cat 1 (reassuring)  TOCO:  Q 2-3 minutes    Cervical Exam:  Dilation:  5  Effacement:  80%  Station: 2  Presentation: Vertex     Significant Labs:  Lab Results   Component Value Date    GROUPTRH AB POS 2016    HEPBSAG Negative 2016    STREPBCULT No Group B Streptococcus isolated 2017       I have personallly reviewed all pertinent lab results from the last 24 hours.    Physical Exam:   Constitutional: She is oriented to person, place, and time. She appears well-developed and well-nourished. No distress.        Pulmonary/Chest: Effort normal.        Abdominal: Soft.     Genitourinary: Vagina normal.           Musculoskeletal: Normal range of motion.       Neurological: She is alert and oriented to person, place, and time.    Skin: Skin is warm and dry.    Psychiatric: She has a normal mood and affect. Her behavior is normal. Judgment and thought content normal.

## 2017-05-17 NOTE — PROGRESS NOTES
[facility]  Obstetrics  Labor Progress Note    Patient Name: Ebony Rodriguez  MRN: [id number]  Admission Date: 2017  Hospital Length of Stay: 0 days  Attending Physician: Keila Lema MD  Primary Care Provider: Primary Doctor No    Subjective:     Principal Problem:  labor    Hospital Course:  Admitted for observation secondary to  gestation and advanced dilation with minimal contractions.  Last exam 5-6cm, still with minimal pain/contractions. Discussed with Dr. Lema. Orders received to begin Pitocin augmentation.    Interval History:  Ebony is a 32 y.o.  at 37w6d. She is doing well. Denies pain with contractions.     Objective:     Vital Signs (Most Recent):  Temp: 98.4 °F (36.9 °C) (17 0402)  Pulse: (!) 58 (17 0501)  Resp: 16 (17 0402)  BP: 118/70 (17 0501) Vital Signs (24h Range):  Temp:  [97.4 °F (36.3 °C)-98.6 °F (37 °C)] 98.4 °F (36.9 °C)  Pulse:  [] 58  Resp:  [16-18] 16  BP: (110-136)/(64-86) 118/70     Weight: 60.8 kg (134 lb)  Body mass index is 26.17 kg/(m^2).    FHT: Cat 1 (reassuring)  TOCO: Q 3-4 minutes    Cervical Exam:  Dilation:  5-6  Effacement:  80%  Station: +2  Presentation: Vertex     Significant Labs:  Lab Results   Component Value Date    GROUPTRH AB POS 2017    HEPBSAG Negative 2016    STREPBCULT No Group B Streptococcus isolated 2017       I have personallly reviewed all pertinent lab results from the last 24 hours.    Physical Exam:   Constitutional: She is oriented to person, place, and time. She appears well-developed and well-nourished. No distress.    HENT:   Head: Normocephalic and atraumatic.       Pulmonary/Chest: Effort normal.        Abdominal: Soft. There is no tenderness.             Musculoskeletal: Moves all extremeties.       Neurological: She is alert and oriented to person, place, and time.     Psychiatric: She has a normal mood and affect.       Assessment/Plan:     32 y.o.  female  at 37w6d for:     labor  Not in active labor, but ctx are becoming more regular now, about q 4 min. Multiparous with advanced dilation. Discussed with Dr. Lema. Will not send home at this point due to concern for precipitous delivery at home. Ok to intervene if needed. Pt desires expectant mgmt until  returns to hospital. Reassured patient of fetal wellbeing, no need to rush delivery at this time. Will continue to monitor. Pt will let us know when  has arrived and she would like to proceed with AROM or Pitocin augmentation.    Francoise Sánchez CNM  Obstetrics  [facility]

## 2017-05-17 NOTE — ASSESSMENT & PLAN NOTE
Patient last examined per RN at 0230, 7.5 hours ago. Exam was noted to be 5-6/80/+2. Current exam 4/70/0. Patient made some cervical change from yesterday's office exam (2cm) to now. However, no cervical change in 7.5 hours now and not regularly eddy. Left message for Dr. Lema to return call to discuss plan of care.

## 2017-05-17 NOTE — PROGRESS NOTES
[facility] -   Obstetrics  Labor Progress Note    Patient Name: Ebony Rodriguez  MRN: [id number]  Admission Date: 2017  Hospital Length of Stay: 0 days  Attending Physician: Keila Lema MD  Primary Care Provider: Primary Doctor No    Subjective:     Principal Problem:<principal problem not specified>    Hospital Course:  Admit for observation    Interval History:  Ebony is a 32 y.o.  at 37w5d. She is doing well. Reports occasional pressure, but denies pain.     Objective:     Vital Signs (Most Recent):  Temp: 98.6 °F (37 °C) (17 174)  Pulse: 100 (17 174)  Resp: 18 (17)  BP: 114/86 (17) Vital Signs (24h Range):  Temp:  [98.6 °F (37 °C)] 98.6 °F (37 °C)  Pulse:  [100] 100  Resp:  [18] 18  BP: (110-114)/(64-86) 114/86     Weight: 60.8 kg (134 lb)  Body mass index is 26.17 kg/(m^2).    FHT: 125 Cat 1 (reassuring)  TOCO:  Q 2-3 minutes    Cervical Exam:  Dilation:  5  Effacement:  80%  Station: 2  Presentation: Vertex     Significant Labs:  Lab Results   Component Value Date    GROUPTRH AB POS 2016    HEPBSAG Negative 2016    STREPBCULT No Group B Streptococcus isolated 2017       I have personallly reviewed all pertinent lab results from the last 24 hours.    Physical Exam:   Constitutional: She is oriented to person, place, and time. She appears well-developed and well-nourished. No distress.        Pulmonary/Chest: Effort normal.        Abdominal: Soft.     Genitourinary: Vagina normal.           Musculoskeletal: Normal range of motion.       Neurological: She is alert and oriented to person, place, and time.    Skin: Skin is warm and dry.    Psychiatric: She has a normal mood and affect. Her behavior is normal. Judgment and thought content normal.       Assessment/Plan:     32 y.o. female  at 37w5d for:    Threatened labor at term  Overnight observation        Sharon Lema CNM  Obstetrics  [facility]

## 2017-05-17 NOTE — ASSESSMENT & PLAN NOTE
Not in active labor, but ctx are becoming more regular now, about q 4 min. Multiparous with advanced dilation. Discussed with Dr. Lema. Will not send home at this point due to concern for precipitous delivery at home. Ok to intervene if needed. Pt desires expectant mgmt until  returns to hospital. Reassured patient of fetal wellbeing, no need to rush delivery at this time. Will continue to monitor. Pt will let us know when  has arrived and she would like to proceed with AROM or Pitocin augmentation.

## 2017-05-17 NOTE — SUBJECTIVE & OBJECTIVE
Interval History:  Ebony is a 32 y.o.  at 37w6d. She is doing well. Still denies pain.    Objective:     Vital Signs (Most Recent):  Temp: 98.2 °F (36.8 °C) (17 0701)  Pulse: 92 (17 0801)  Resp: 16 (17 0402)  BP: 113/80 (17 0801) Vital Signs (24h Range):  Temp:  [97.4 °F (36.3 °C)-98.6 °F (37 °C)] 98.2 °F (36.8 °C)  Pulse:  [] 92  Resp:  [16-18] 16  BP: (113-136)/(69-93) 113/80     Weight: 60.8 kg (134 lb)  Body mass index is 26.17 kg/(m^2).    FHT: Cat 1 (reassuring)  TOCO:  Q 3-5 minutes    Cervical Exam:  Dilation:  4  Effacement:  75%  Station: 0  Presentation: Vertex     Significant Labs:  Lab Results   Component Value Date    GROUPTRH AB POS 2017    HEPBSAG Negative 2016    STREPBCULT No Group B Streptococcus isolated 2017       I have personallly reviewed all pertinent lab results from the last 24 hours.    Physical Exam:   Constitutional: She is oriented to person, place, and time. She appears well-developed and well-nourished. No distress.        Pulmonary/Chest: Effort normal.        Abdominal: Soft. There is no tenderness.     Genitourinary: Vagina normal.           Musculoskeletal: Moves all extremeties.       Neurological: She is alert and oriented to person, place, and time.     Psychiatric: She has a normal mood and affect.

## 2017-05-17 NOTE — NURSING
"Discussed feeding choice with mother.  Reviewed benefits of breastfeeding.  Patient given "What to Expect in the First 48 Hours" handout. Mother states her intention is Bottle Feeding.     "

## 2017-05-17 NOTE — SUBJECTIVE & OBJECTIVE
Interval History:  Ebony is a 32 y.o.  at 37w6d. She is doing well. Denies pain with contractions.     Objective:     Vital Signs (Most Recent):  Temp: 98.4 °F (36.9 °C) (17 0402)  Pulse: (!) 58 (17 0501)  Resp: 16 (17 0402)  BP: 118/70 (17 0501) Vital Signs (24h Range):  Temp:  [97.4 °F (36.3 °C)-98.6 °F (37 °C)] 98.4 °F (36.9 °C)  Pulse:  [] 58  Resp:  [16-18] 16  BP: (110-136)/(64-86) 118/70     Weight: 60.8 kg (134 lb)  Body mass index is 26.17 kg/(m^2).    FHT: Cat 1 (reassuring)  TOCO: Q 3-4 minutes    Cervical Exam:  Dilation:  5-6  Effacement:  80%  Station: +2  Presentation: Vertex     Significant Labs:  Lab Results   Component Value Date    GROUPTRH AB POS 2017    HEPBSAG Negative 2016    STREPBCULT No Group B Streptococcus isolated 2017       I have personallly reviewed all pertinent lab results from the last 24 hours.    Physical Exam:   Constitutional: She is oriented to person, place, and time. She appears well-developed and well-nourished. No distress.    HENT:   Head: Normocephalic and atraumatic.       Pulmonary/Chest: Effort normal.        Abdominal: Soft. There is no tenderness.             Musculoskeletal: Moves all extremeties.       Neurological: She is alert and oriented to person, place, and time.     Psychiatric: She has a normal mood and affect.

## 2017-05-17 NOTE — PROGRESS NOTES
[facility] -   Obstetrics  Labor Progress Note    Patient Name: Ebony Rodriguez  MRN: [id number]  Admission Date: 2017  Hospital Length of Stay: 0 days  Attending Physician: Keila Lema MD  Primary Care Provider: Primary Doctor No    Subjective:     Principal Problem:<principal problem not specified>    Hospital Course:  Admitted for prolonged observation secondary to  contractions and likely early labor. Slow cervical change was made throughout observation period. Patient admitted as inpatient 17 at 0720 secondary to advanced dilation and multiparity.     Interval History:  Ebony is a 32 y.o.  at 37w6d. She is doing well. Still denies pain.    Objective:     Vital Signs (Most Recent):  Temp: 98.2 °F (36.8 °C) (17 0701)  Pulse: 92 (17 0801)  Resp: 16 (17 0402)  BP: 113/80 (17 0801) Vital Signs (24h Range):  Temp:  [97.4 °F (36.3 °C)-98.6 °F (37 °C)] 98.2 °F (36.8 °C)  Pulse:  [] 92  Resp:  [16-18] 16  BP: (113-136)/(69-93) 113/80     Weight: 60.8 kg (134 lb)  Body mass index is 26.17 kg/(m^2).    FHT: Cat 1 (reassuring)  TOCO:  Q 3-5 minutes    Cervical Exam:  Dilation:  4  Effacement:  75%  Station: 0  Presentation: Vertex     Significant Labs:  Lab Results   Component Value Date    GROUPTRH AB POS 2017    HEPBSAG Negative 2016    STREPBCULT No Group B Streptococcus isolated 2017       I have personallly reviewed all pertinent lab results from the last 24 hours.    Physical Exam:   Constitutional: She is oriented to person, place, and time. She appears well-developed and well-nourished. No distress.        Pulmonary/Chest: Effort normal.        Abdominal: Soft. There is no tenderness.     Genitourinary: Vagina normal.           Musculoskeletal: Moves all extremeties.       Neurological: She is alert and oriented to person, place, and time.     Psychiatric: She has a normal mood and affect.       Assessment/Plan:     32 y.o.  female  at 37w6d for:    Threatened premature labor in third trimester  Patient last examined per RN at 0230, 7.5 hours ago. Exam was noted to be 5-/+2. Current exam /0. Patient made some cervical change from yesterday's office exam (2cm) to now. However, no cervical change in 7.5 hours now and not regularly eddy. Left message for Dr. Lema to return call to discuss plan of care.        Francoise Sánchez CNM  Obstetrics  [facility]

## 2017-05-17 NOTE — DISCHARGE SUMMARY
[facility] -   Obstetrics  Discharge Summary      Patient Name: Ebony Rodriguez  MRN: [id number]  Admission Date: 2017  Hospital Length of Stay: 1 days  Discharge Date and Time:  2017   Attending Physician: Keila Lema MD   Discharging Provider: Joi Morel CNM  Primary Care Provider: Primary Doctor No    HPI: Seen in office today, c/o contractions and vaginal discharge. No leaking of fluid.     * No surgery found *     Hospital Course:   Admitted for prolonged observation secondary to  contractions and likely early labor. Slow cervical change was made throughout observation period. Patient admitted as inpatient 17 at 0720 secondary to advanced dilation and multiparity.   0230 cervical exam per RN /+2  1000 cervical exam per CNM /0. Patient denies pain/ctx.   Discussed plan of care with Dr. Lema. Offered patient longer observation period to rule out early labor. Patient declines. Discharged home with labor precautions and L&D phone # to call with questions.      Consults         Status Ordering Provider     Inpatient consult to Anesthesiology  Once     Provider:  (Not yet assigned)    Acknowledged JOI MOREL          Final Active Diagnoses:    Diagnosis Date Noted POA      Problems Resolved During this Admission:    Diagnosis Date Noted Date Resolved POA    PRINCIPAL PROBLEM:  Threatened premature labor in third trimester [O47.03] 2017 Yes    Threatened premature labor in third trimester [O47.03] 2017 Yes        Labs: All labs within the past 24 hours have been reviewed    Feeding Method: breast    Immunizations     None          This patient has no babies on file.  Pending Diagnostic Studies:     None          Discharged Condition: stable    Disposition: Home or Self Care    Follow Up:  Follow-up Information     Follow up with Keila Lema MD On 2017.    Specialty:  Obstetrics and Gynecology    Why:  routine OB  follow-up    Contact information:    [address] [facility] [address] [phone]          Patient Instructions:     Diet general     Activity as tolerated       Medications:  Current Discharge Medication List      CONTINUE these medications which have NOT CHANGED    Details   PRENATAL VIT/IRON FUMARATE/FA (PRENATAL 1+1 ORAL) Take by mouth.             Francoise Sánchez CNM  Obstetrics  [facility]

## 2017-05-18 ENCOUNTER — HOSPITAL ENCOUNTER (OUTPATIENT)
Facility: HOSPITAL | Age: 32
Discharge: HOME | End: 2017-05-18
Attending: OBSTETRICS & GYNECOLOGY | Admitting: OBSTETRICS & GYNECOLOGY
Payer: MEDICAID

## 2017-05-18 VITALS
HEIGHT: 60 IN | TEMPERATURE: 98 F | WEIGHT: 137 LBS | BODY MASS INDEX: 26.9 KG/M2 | SYSTOLIC BLOOD PRESSURE: 108 MMHG | RESPIRATION RATE: 16 BRPM | HEART RATE: 84 BPM | DIASTOLIC BLOOD PRESSURE: 88 MMHG

## 2017-05-18 DIAGNOSIS — O36.8130 DECREASED FETAL MOVEMENTS IN THIRD TRIMESTER: ICD-10-CM

## 2017-05-18 DIAGNOSIS — O36.8190 DECREASED FETAL MOVEMENT: ICD-10-CM

## 2017-05-18 PROCEDURE — 59025 FETAL NON-STRESS TEST: CPT

## 2017-05-18 PROCEDURE — 99211 OFF/OP EST MAY X REQ PHY/QHP: CPT | Mod: 25

## 2017-05-18 RX ORDER — ONDANSETRON 8 MG/1
8 TABLET, ORALLY DISINTEGRATING ORAL EVERY 8 HOURS PRN
Status: DISCONTINUED | OUTPATIENT
Start: 2017-05-18 | End: 2017-05-18 | Stop reason: HOSPADM

## 2017-05-18 RX ORDER — ACETAMINOPHEN 500 MG
500 TABLET ORAL EVERY 6 HOURS PRN
Status: DISCONTINUED | OUTPATIENT
Start: 2017-05-18 | End: 2017-05-18 | Stop reason: HOSPADM

## 2017-05-18 NOTE — SUBJECTIVE & OBJECTIVE
Obstetric HPI:  Patient reports None contractions, decreased  fetal movement, No vaginal bleeding , No loss of fluid     This pregnancy has been uncomplicated     Obstetric History       T2      TAB0   SAB2   E0   M0   L4       # Outcome Date GA Lbr Andres/2nd Weight Sex Delivery Anes PTL Lv   7 Current            6 Term 14 38w0d   M Vag-Spont   Y      Complications: Cervical cerclage suture present   5 Term 13 38w0d   M Vag-Spont   Y      Complications: Cervical cerclage suture present   4 SAB 12 20w0d   F SAB   ND   3  10/31/09 36w0d   F Vag-Spont   Y      Complications: Cervical cerclage suture present   2  05 36w0d   F Vag-Spont   Y      Complications: Cervical cerclage suture present   1 SAB 00 20w0d   M    ND        Past Medical History:   Diagnosis Date    Anemia     H/O cervical cerclage, currently pregnant     Miscarriage      Past Surgical History:   Procedure Laterality Date    CERVICAL CERCLAGE         PTA Medications   Medication Sig    PRENATAL VIT/IRON FUMARATE/FA (PRENATAL 1+1 ORAL) Take by mouth.       Review of patient's allergies indicates:  No Known Allergies     Family History     Problem Relation (Age of Onset)    Diabetes Mother    Heart failure Father        Social History Main Topics    Smoking status: Never Smoker    Smokeless tobacco: Not on file    Alcohol use No    Drug use: No    Sexual activity: Not Currently     Partners: Male     Review of Systems   All other systems reviewed and are negative.     Objective:     Vital Signs (Most Recent):    Vital Signs (24h Range):           There is no height or weight on file to calculate BMI.    FHT: Cat 1 (reassuring)  TOCO:  none    Physical Exam:   Constitutional: She is oriented to person, place, and time. She appears well-developed and well-nourished.       Cardiovascular: Normal rate, regular rhythm and normal heart sounds.     Pulmonary/Chest: Effort normal and breath sounds  normal.        Abdominal: Soft.                 Neurological: She is alert and oriented to person, place, and time.    Skin: Skin is warm and dry.    Psychiatric: She has a normal mood and affect.       SVE: Deferred no uterine ctxs     Significant Labs:  Lab Results   Component Value Date    GROUPTRH AB POS 05/16/2017    HEPBSAG Negative 11/18/2016    STREPBCULT No Group B Streptococcus isolated 05/02/2017       I have personallly reviewed all pertinent lab results from the last 24 hours.

## 2017-05-18 NOTE — DISCHARGE INSTRUCTIONS
Discharge Instructions    Self Care Instructions:    Diet:  · Eat from the five basic food groups  · Fruits and proteins are good choices  · Limit fast foods and added salt/sugar  · Moderate carbonated and caffeine drinks    Hydration:  · Drink at least 8 large glasses of water a day    Kick Counts:  · After a meal, rest on your side and note the baby's movements until you have 8-10 movements in a 2 hour counting period.    · If you do not feel your baby move 8-10 times within 2 hours or you sense a change in the type or character of the baby's movement, you should come in to the hospital at once.  · Remember; your baby can sleep for 20-40 minutes at a time.      When to notify your provider:    · Vaginal bleeding like a period;  You may spot if we examined your cervix.  · If your water breaks, come to the birth center.  Note time, color and odor.  · Abdominal tenderness or pain that does not go away  · Contractions every 3 to 5 minutes for 1 to 2 hours.  True contractions move from front to back, are regular; usually get longer, stronger and closer together and do not stop if you change your position or activity.  · Any burning, urgency or frequency in relation to emptying your bladder.  · Any temperature greater than 100.4 degrees, chills, flu-like symptoms     Discharge Instructions    Self Care Instructions:    Diet:  · Eat from the five basic food groups  · Fruits and proteins are good choices  · Limit fast foods and added salt/sugar  · Moderate carbonated and caffeine drinks    Hydration:  · Drink at least 8 large glasses of water a day    Kick Counts:  · After a meal, rest on your side and note the baby's movements until you have 8-10 movements in a 2 hour counting period.    · If you do not feel your baby move 8-10 times within 2 hours or you sense a change in the type or character of the baby's movement, you should come in to the hospital at once.  · Remember; your baby can sleep for  20-40 minutes at a time.      When to notify your provider:    · Vaginal bleeding like a period;  You may spot if we examined your cervix.  · If your water breaks, come to the birth center.  Note time, color and odor.  · Abdominal tenderness or pain that does not go away  · Contractions every 3 to 5 minutes for 1 to 2 hours.  True contractions move from front to back, are regular; usually get longer, stronger and closer together and do not stop if you change your position or activity.  · Any burning, urgency or frequency in relation to emptying your bladder.  · Any temperature greater than 100.4 degrees, chills, flu-like symptoms

## 2017-05-18 NOTE — H&P
[facility] -   Obstetrics  History & Physical    Patient Name: Ebony Rodriguez  MRN: [id number]  Admission Date: 2017  Primary Care Provider: Primary Doctor No    Subjective:     Principal Problem:Decreased fetal movements in third trimester    History of Present Illness:  Pt arrived to the hospital with complaints of not feeling the baby move this morning.     Obstetric HPI:  Patient reports None contractions, decreased  fetal movement, No vaginal bleeding , No loss of fluid     This pregnancy has been uncomplicated     Obstetric History       T2      TAB0   SAB2   E0   M0   L4       # Outcome Date GA Lbr Andres/2nd Weight Sex Delivery Anes PTL Lv   7 Current            6 Term 14 38w0d   M Vag-Spont   Y      Complications: Cervical cerclage suture present   5 Term 13 38w0d   M Vag-Spont   Y      Complications: Cervical cerclage suture present   4 SAB 12 20w0d   F SAB   ND   3  10/31/09 36w0d   F Vag-Spont   Y      Complications: Cervical cerclage suture present   2  05 36w0d   F Vag-Spont   Y      Complications: Cervical cerclage suture present   1 SAB 00 20w0d   M    ND        Past Medical History:   Diagnosis Date    Anemia     H/O cervical cerclage, currently pregnant     Miscarriage      Past Surgical History:   Procedure Laterality Date    CERVICAL CERCLAGE         PTA Medications   Medication Sig    PRENATAL VIT/IRON FUMARATE/FA (PRENATAL 1+1 ORAL) Take by mouth.       Review of patient's allergies indicates:  No Known Allergies     Family History     Problem Relation (Age of Onset)    Diabetes Mother    Heart failure Father        Social History Main Topics    Smoking status: Never Smoker    Smokeless tobacco: Not on file    Alcohol use No    Drug use: No    Sexual activity: Not Currently     Partners: Male     Review of Systems   All other systems reviewed and are negative.     Objective:     Vital Signs (Most Recent):     Vital Signs (24h Range):           There is no height or weight on file to calculate BMI.    FHT: Cat 1 (reassuring)  TOCO:  none    Physical Exam:   Constitutional: She is oriented to person, place, and time. She appears well-developed and well-nourished.       Cardiovascular: Normal rate, regular rhythm and normal heart sounds.     Pulmonary/Chest: Effort normal and breath sounds normal.        Abdominal: Soft.                 Neurological: She is alert and oriented to person, place, and time.    Skin: Skin is warm and dry.    Psychiatric: She has a normal mood and affect.       SVE: Deferred no uterine ctxs     Significant Labs:  Lab Results   Component Value Date    GROUPTRH AB POS 2017    HEPBSAG Negative 2016    STREPBCULT No Group B Streptococcus isolated 2017       I have personallly reviewed all pertinent lab results from the last 24 hours.    Assessment/Plan:     32 y.o. female  at 38w0d for:    * Decreased fetal movements in third trimester  NST reactive +accels, no decels, no uterine ctxs  Will DC home      Eve Baum CNM  Obstetrics  [facility]

## 2017-05-18 NOTE — IP AVS SNAPSHOT
[facility]  [id number] [place] [address] [address] [place] [id number]           Patient Discharge Instructions   Our goal is to set you up for success. This packet includes information on your condition, medications, and your home care.  It will help you care for yourself to prevent having to return to the hospital.     Please ask your nurse if you have any questions.      There are many details to remember when preparing to leave the hospital. Here is what you will need to do:    1. Take your medicine. If you are prescribed medications, review your Medication List on the following pages. You may have new medications to  at the pharmacy and others that you'll need to stop taking. Review the instructions for how and when to take your medications. Talk with your doctor or nurses if you are unsure of what to do.     2. Go to your follow-up appointments. Specific follow-up information is listed in the following pages. Your may be contacted by a nurse or clinical provider about future appointments. Be sure we have all of the phone numbers to reach you. Please contact your provider's office if you are unable to make an appointment.     3. Watch for warning signs. Your doctor or nurse will give you detailed warning signs to watch for and when to call for assistance. These instructions may also include educational information about your condition. If you experience any of warning signs to your health, call your doctor.           [facility]  Unless otherwise directed by your provider, please   contact [facility], our nurse care line   that is available for 24/7 assistance.     [phone] (toll-free)     Registered nurses in the [facility]   provide: appointment scheduling, clinical advisement, health education, and other advisory services.                  ** Verify the list of medication(s) below is accurate and up to date. Carry this with you in case of emergency. If your  medications have changed, please notify your healthcare provider.             Medication List      CONTINUE taking these medications        Additional Info                      PRENATAL 1+1 ORAL   Refills:  0    Instructions:  Take by mouth.     Begin Date    AM    Noon    PM    Bedtime                  Please bring to all follow up appointments:    1. A copy of your discharge instructions.  2. All medicines you are currently taking in their original bottles.  3. Identification and insurance card.    Please arrive 15 minutes ahead of scheduled appointment time.    Please call 24 hours in advance if you must reschedule your appointment and/or time.        Your Scheduled Appointments     May 23, 2017 10:00 AM CDT   Routine Prenatal Visit with MD [facility] - Obstetrics and Gynecology ([facility])    [address] [place] [id number] [phone]              Follow-up Information     Please follow up.    Why:  As needed          Discharge Instructions        Discharge Instructions    Self Care Instructions:    Diet:  · Eat from the five basic food groups  · Fruits and proteins are good choices  · Limit fast foods and added salt/sugar  · Moderate carbonated and caffeine drinks    Hydration:  · Drink at least 8 large glasses of water a day    Kick Counts:  · After a meal, rest on your side and note the baby's movements until you have 8-10 movements in a 2 hour counting period.    · If you do not feel your baby move 8-10 times within 2 hours or you sense a change in the type or character of the baby's movement, you should come in to the hospital at once.  · Remember; your baby can sleep for 20-40 minutes at a time.      When to notify your provider:    · Vaginal bleeding like a period;  You may spot if we examined your cervix.  · If your water breaks, come to the birth center.  Note time, color and odor.  · Abdominal tenderness or pain that does not go away  · Contractions  every 3 to 5 minutes for 1 to 2 hours.  True contractions move from front to back, are regular; usually get longer, stronger and closer together and do not stop if you change your position or activity.  · Any burning, urgency or frequency in relation to emptying your bladder.  · Any temperature greater than 100.4 degrees, chills, flu-like symptoms     Discharge Instructions    Self Care Instructions:    Diet:  · Eat from the five basic food groups  · Fruits and proteins are good choices  · Limit fast foods and added salt/sugar  · Moderate carbonated and caffeine drinks    Hydration:  · Drink at least 8 large glasses of water a day    Kick Counts:  · After a meal, rest on your side and note the baby's movements until you have 8-10 movements in a 2 hour counting period.    · If you do not feel your baby move 8-10 times within 2 hours or you sense a change in the type or character of the baby's movement, you should come in to the hospital at once.  · Remember; your baby can sleep for 20-40 minutes at a time.      When to notify your provider:    · Vaginal bleeding like a period;  You may spot if we examined your cervix.  · If your water breaks, come to the birth center.  Note time, color and odor.  · Abdominal tenderness or pain that does not go away  · Contractions every 3 to 5 minutes for 1 to 2 hours.  True contractions move from front to back, are regular; usually get longer, stronger and closer together and do not stop if you change your position or activity.  · Any burning, urgency or frequency in relation to emptying your bladder.  · Any temperature greater than 100.4 degrees, chills, flu-like symptoms          Primary Diagnosis     Your primary diagnosis was:  Decreased Fetal Movement In Pregnancy      Admission Information     Date & Time Provider Department CSN    2017 10:44 AM Tina Love MD [facility] [id number]      Care Providers     Provider Role Specialty Primary office  phone    Tina Love MD Attending Provider Obstetrics and Gynecology [phone]      Your Vitals Were     BP Pulse Temp Resp Height Weight    108/88 84 98.4 °F (36.9 °C) (Oral) 16 5' (1.524 m) 62.1 kg (137 lb)    Last Period BMI             09/08/2016 26.76 kg/m2         Recent Lab Values     No lab values to display.      Allergies as of 5/18/2017     No Known Allergies      Advance Directives     An advance directive is a document which, in the event you are no longer able to make decisions for yourself, tells your healthcare team what kind of treatment you do or do not want to receive, or who you would like to make those decisions for you.  If you do not currently have an advance directive, [facility] encourages you to create one.  For more information call:  [phone]), [phone] ([phone]),  or log on to [email or web address].        Language Assistance Services     ATTENTION: Language assistance services are available, free of charge. Please call [phone].      ATENCIÓN: Si habla español, tiene a diallo disposición servicios gratuitos de asistencia lingüística. Llame al [phone].     CHÚ Ý: N?u b?n nói Ti?ng Vi?t, có các d?ch v? h? tr? ngôn ng? mi?n phí dành cho b?n. G?i s? [phone].        [facility] Sign-Up     Activating your [facility] account is as easy as 1-2-3!     1) Visit [email or web address], select Sign Up Now, enter this activation code and your date of birth, then select Next.  [email or web address]  Expires: 6/24/2017  8:15 PM      2) Create a username and password to use when you visit [facility] in the future and select a security question in case you lose your password and select Next.    3) Enter your e-mail address and click Sign Up!    Additional Information  If you have questions, please e-mail [place] [email or web address] or call [phone] to talk to our [facility] staff. Remember, [facility] is NOT to be used for urgent needs. For medical emergencies, dial 911.           [facility] complies with applicable Federal civil rights laws and does not discriminate on the basis of race, color, national origin, age, disability, or sex.

## 2017-05-21 ENCOUNTER — HOSPITAL ENCOUNTER (OUTPATIENT)
Facility: HOSPITAL | Age: 32
Discharge: HOME | End: 2017-05-21
Attending: OBSTETRICS & GYNECOLOGY | Admitting: OBSTETRICS & GYNECOLOGY
Payer: MEDICAID

## 2017-05-21 VITALS
RESPIRATION RATE: 17 BRPM | WEIGHT: 134 LBS | HEIGHT: 60 IN | DIASTOLIC BLOOD PRESSURE: 73 MMHG | BODY MASS INDEX: 26.31 KG/M2 | TEMPERATURE: 98 F | HEART RATE: 78 BPM | SYSTOLIC BLOOD PRESSURE: 114 MMHG

## 2017-05-21 DIAGNOSIS — O99.891 BACK PAIN AFFECTING PREGNANCY IN THIRD TRIMESTER: ICD-10-CM

## 2017-05-21 DIAGNOSIS — M54.9 BACK PAIN AFFECTING PREGNANCY IN THIRD TRIMESTER: ICD-10-CM

## 2017-05-21 PROCEDURE — 59025 FETAL NON-STRESS TEST: CPT

## 2017-05-21 PROCEDURE — 99211 OFF/OP EST MAY X REQ PHY/QHP: CPT | Mod: 25

## 2017-05-21 PROCEDURE — 99212 OFFICE O/P EST SF 10 MIN: CPT | Mod: 25,TH,S$PBB, | Performed by: MIDWIFE

## 2017-05-21 PROCEDURE — 59025 FETAL NON-STRESS TEST: CPT | Mod: 26,,, | Performed by: MIDWIFE

## 2017-05-21 PROCEDURE — 25000003 PHARM REV CODE 250: Performed by: MIDWIFE

## 2017-05-21 RX ORDER — CYCLOBENZAPRINE HCL 10 MG
10 TABLET ORAL ONCE
Status: COMPLETED | OUTPATIENT
Start: 2017-05-21 | End: 2017-05-21

## 2017-05-21 RX ORDER — ONDANSETRON 8 MG/1
8 TABLET, ORALLY DISINTEGRATING ORAL EVERY 8 HOURS PRN
Status: DISCONTINUED | OUTPATIENT
Start: 2017-05-21 | End: 2017-05-21 | Stop reason: HOSPADM

## 2017-05-21 RX ORDER — ACETAMINOPHEN 500 MG
500 TABLET ORAL EVERY 6 HOURS PRN
Status: DISCONTINUED | OUTPATIENT
Start: 2017-05-21 | End: 2017-05-21 | Stop reason: HOSPADM

## 2017-05-21 RX ADMIN — CYCLOBENZAPRINE HYDROCHLORIDE 10 MG: 10 TABLET, FILM COATED ORAL at 07:05

## 2017-05-22 NOTE — NURSING
LUCA Croft CNM, states to give patient 10mg Flexeril PO and reexamine cervix in 2 hours from the first exam.

## 2017-05-22 NOTE — HOSPITAL COURSE
5/21/ hours pt monitored, examined, flexeril given, will recheck cervix in 2 hours, cervix essentially unchanged, d/c home

## 2017-05-22 NOTE — H&P
[facility] -   Obstetrics  History & Physical    Patient Name: Ebony Rodriguez  MRN: [id number]  Admission Date: 2017  Primary Care Provider: Primary Doctor No    Subjective:     Principal Problem:Back pain affecting pregnancy in third trimester    History of Present Illness:  Pt presented to unit with c/o back pain, losing her mucous plug and leaking breast milk    Obstetric HPI:  Patient reports None contractions, active fetal movement, No vaginal bleeding , No loss of fluid     This pregnancy has been complicated by hx incompetent cervix, cerclage with this one, removed at 35 weeks    Obstetric History       T2      L4     SAB0   TAB0   Ectopic0   Multiple0   Live Births0       # Outcome Date GA Lbr Andres/2nd Weight Sex Delivery Anes PTL Lv   7 Current            6 Term 14 38w0d   M Vag-Spont   KOFI      Complications: Cervical cerclage suture present   5 Term 13 38w0d   M Vag-Spont   KOFI      Complications: Cervical cerclage suture present   4 SAB 12 20w0d   F SAB   ND   3  10/31/09 36w0d   F Vag-Spont   KOFI      Complications: Cervical cerclage suture present   2  05 36w0d   F Vag-Spont   KOFI      Complications: Cervical cerclage suture present   1 SAB 00 20w0d   M    ND        Past Medical History:   Diagnosis Date    Anemia     H/O cervical cerclage, currently pregnant     Miscarriage      Past Surgical History:   Procedure Laterality Date    CERVICAL CERCLAGE         PTA Medications   Medication Sig    PRENATAL VIT/IRON FUMARATE/FA (PRENATAL 1+1 ORAL) Take by mouth.       Review of patient's allergies indicates:  No Known Allergies     Family History     Problem Relation (Age of Onset)    Diabetes Mother    Heart failure Father        Social History Main Topics    Smoking status: Never Smoker    Smokeless tobacco: Not on file    Alcohol use No    Drug use: No    Sexual activity: Not Currently     Partners: Male     Review of  Systems   Constitutional: Negative.    Genitourinary: Positive for vaginal discharge.   Musculoskeletal: Positive for back pain.   Psychiatric/Behavioral: Negative.       Objective:     Vital Signs (Most Recent):  Temp: 98.1 °F (36.7 °C) (17)  Pulse: 82 (17)  Resp: 17 (17)  BP: 114/74 (17) Vital Signs (24h Range):  Temp:  [98.1 °F (36.7 °C)] 98.1 °F (36.7 °C)  Pulse:  [82-85] 82  Resp:  [17] 17  BP: (110-114)/(74-82) 114/74        There is no height or weight on file to calculate BMI.    FHT: Cat 1 (reassuring)  TOCO:  Q 3-4 minutes    Physical Exam:   Constitutional: She is oriented to person, place, and time. She appears well-developed and well-nourished.      Neck: Normal range of motion.     Pulmonary/Chest: Effort normal.        Abdominal: Soft.             Musculoskeletal: Normal range of motion and moves all extremeties.       Neurological: She is alert and oriented to person, place, and time.    Skin: Skin is warm and dry.    Psychiatric: She has a normal mood and affect. Her behavior is normal. Judgment and thought content normal.       Cervix:  Dilation:  4  Effacement:  60  Station: -2  Presentation: Vertex     Significant Labs:  Lab Results   Component Value Date    GROUPTRH AB POS 2017    HEPBSAG Negative 2016    STREPBCULT No Group B Streptococcus isolated 2017       I have personallly reviewed all pertinent lab results from the last 24 hours.    Assessment/Plan:     32 y.o. female  at 38w3d for:    No notes have been filed under this hospital service.  Service: Obstetrics and Gynecology  Flexeril and recheck in 2 hours    Glen Croft CNM  Obstetrics  [facility]

## 2017-05-22 NOTE — DISCHARGE SUMMARY
[facility]  Obstetrics  Discharge Summary      Patient Name: Ebony Rodriguez  MRN: [id number]  Admission Date: 5/21/2017  Hospital Length of Stay: 0 days  Discharge Date and Time:  05/21/2017 9:13 PM  Attending Physician: Tina Love MD   Discharging Provider: Glen Croft CNM  Primary Care Provider: Primary Doctor No    HPI: Pt presented to unit with c/o back pain, losing her mucous plug and leaking breast milk    * No surgery found *     Hospital Course:   5/21/ hours pt monitored, examined, flexeril given, will recheck cervix in 2 hours, cervix essentially unchanged, d/c home        Final Active Diagnoses:    Diagnosis Date Noted POA    PRINCIPAL PROBLEM:  Back pain affecting pregnancy in third trimester [O26.893, M54.9] 05/21/2017 Yes      Problems Resolved During this Admission:    Diagnosis Date Noted Date Resolved POA          Immunizations     None          This patient has no babies on file.  Pending Diagnostic Studies:     None          Discharged Condition: good    Disposition: Home    Follow Up:  Follow-up Information     Please follow up.    Why:  as sched               Patient Instructions:   No discharge procedures on file.  Medications:  Current Discharge Medication List      CONTINUE these medications which have NOT CHANGED    Details   PRENATAL VIT/IRON FUMARATE/FA (PRENATAL 1+1 ORAL) Take by mouth.             Glen Croft CNM  Obstetrics  [facility]

## 2017-05-22 NOTE — NURSING
Gave patient AVS and educated on  discharge information. Educated on nutrition, hydration, home medications, fetal kick counts, activity, s/s of OB emergencies, and reasons to notify OB provider (including vaginal bleeding like a period, rupture of membranes, constant and strong abdominal pain or tenderness that does not go away, contractions every 3-5 min for 1-2 hours that are increasing in strength, changes in urination such as hematuria/oliguria/dysuria/urgency/frequency, temp greater than 100.4 F). Patient verbalized understanding. Patient, , and two young children ambulating out of triage. Discharged home.

## 2017-05-22 NOTE — SUBJECTIVE & OBJECTIVE
Obstetric HPI:  Patient reports None contractions, active fetal movement, No vaginal bleeding , No loss of fluid     This pregnancy has been complicated by hx incompetent cervix, cerclage with this one, removed at 35 weeks    Obstetric History       T2      L4     SAB0   TAB0   Ectopic0   Multiple0   Live Births0       # Outcome Date GA Lbr Andres/2nd Weight Sex Delivery Anes PTL Lv   7 Current            6 Term 14 38w0d   M Vag-Spont   KOFI      Complications: Cervical cerclage suture present   5 Term 13 38w0d   M Vag-Spont   KOFI      Complications: Cervical cerclage suture present   4 SAB 12 20w0d   F SAB   ND   3  10/31/09 36w0d   F Vag-Spont   KOFI      Complications: Cervical cerclage suture present   2  05 36w0d   F Vag-Spont   KOFI      Complications: Cervical cerclage suture present   1 SAB 00 20w0d   M    ND        Past Medical History:   Diagnosis Date    Anemia     H/O cervical cerclage, currently pregnant     Miscarriage      Past Surgical History:   Procedure Laterality Date    CERVICAL CERCLAGE         PTA Medications   Medication Sig    PRENATAL VIT/IRON FUMARATE/FA (PRENATAL 1+1 ORAL) Take by mouth.       Review of patient's allergies indicates:  No Known Allergies     Family History     Problem Relation (Age of Onset)    Diabetes Mother    Heart failure Father        Social History Main Topics    Smoking status: Never Smoker    Smokeless tobacco: Not on file    Alcohol use No    Drug use: No    Sexual activity: Not Currently     Partners: Male     Review of Systems   Constitutional: Negative.    Genitourinary: Positive for vaginal discharge.   Musculoskeletal: Positive for back pain.   Psychiatric/Behavioral: Negative.       Objective:     Vital Signs (Most Recent):  Temp: 98.1 °F (36.7 °C) (17)  Pulse: 82 (17)  Resp: 17 (17)  BP: 114/74 (17) Vital Signs (24h Range):  Temp:  [98.1 °F (36.7 °C)]  98.1 °F (36.7 °C)  Pulse:  [82-85] 82  Resp:  [17] 17  BP: (110-114)/(74-82) 114/74        There is no height or weight on file to calculate BMI.    FHT: Cat 1 (reassuring)  TOCO:  Q 3-4 minutes    Physical Exam:   Constitutional: She is oriented to person, place, and time. She appears well-developed and well-nourished.      Neck: Normal range of motion.     Pulmonary/Chest: Effort normal.        Abdominal: Soft.             Musculoskeletal: Normal range of motion and moves all extremeties.       Neurological: She is alert and oriented to person, place, and time.    Skin: Skin is warm and dry.    Psychiatric: She has a normal mood and affect. Her behavior is normal. Judgment and thought content normal.       Cervix:  Dilation:  4  Effacement:  60  Station: -2  Presentation: Vertex     Significant Labs:  Lab Results   Component Value Date    GROUPTRH AB POS 05/16/2017    HEPBSAG Negative 11/18/2016    STREPBCULT No Group B Streptococcus isolated 05/02/2017       I have personallly reviewed all pertinent lab results from the last 24 hours.

## 2017-05-22 NOTE — NURSING
"Notified Glen Croft CNM, of patient in triage. Patient states that she has been "having manageable contractions since yesterday", and she came in today because her breasts were leaking. She states that she has never experienced her breasts leaking like that and thought that it might be a sign that she was going into labor. Notified CNM of most recent cervical exam 4/60/-2. CNM states that she will come in to see the patient and speak with her.  "

## 2017-05-23 ENCOUNTER — ROUTINE PRENATAL (OUTPATIENT)
Dept: OBSTETRICS AND GYNECOLOGY | Facility: CLINIC | Age: 32
End: 2017-05-23
Payer: MEDICAID

## 2017-05-23 VITALS
DIASTOLIC BLOOD PRESSURE: 64 MMHG | BODY MASS INDEX: 26.01 KG/M2 | SYSTOLIC BLOOD PRESSURE: 112 MMHG | WEIGHT: 133.19 LBS

## 2017-05-23 DIAGNOSIS — O09.293: Primary | ICD-10-CM

## 2017-05-23 PROCEDURE — 99212 OFFICE O/P EST SF 10 MIN: CPT | Mod: PBBFAC,PN | Performed by: OBSTETRICS & GYNECOLOGY

## 2017-05-23 PROCEDURE — 99213 OFFICE O/P EST LOW 20 MIN: CPT | Mod: TH,S$PBB,, | Performed by: OBSTETRICS & GYNECOLOGY

## 2017-05-23 PROCEDURE — 99999 PR PBB SHADOW E&M-EST. PATIENT-LVL II: CPT | Mod: PBBFAC,,, | Performed by: OBSTETRICS & GYNECOLOGY

## 2017-05-23 NOTE — PROGRESS NOTES
+fetal movement, no srom, no vag bleeding  Still with increased pressure  Observed last week for labor , but sent home at 3-4 cm dilated  Labor precautions reviewed  Continue kick counts

## 2017-05-30 ENCOUNTER — ROUTINE PRENATAL (OUTPATIENT)
Dept: OBSTETRICS AND GYNECOLOGY | Facility: CLINIC | Age: 32
End: 2017-05-30
Payer: MEDICAID

## 2017-05-30 ENCOUNTER — HOSPITAL ENCOUNTER (INPATIENT)
Facility: HOSPITAL | Age: 32
LOS: 2 days | Discharge: HOME | End: 2017-06-01
Attending: OBSTETRICS & GYNECOLOGY | Admitting: OBSTETRICS & GYNECOLOGY
Payer: MEDICAID

## 2017-05-30 VITALS — DIASTOLIC BLOOD PRESSURE: 70 MMHG | WEIGHT: 134.5 LBS | SYSTOLIC BLOOD PRESSURE: 116 MMHG | BODY MASS INDEX: 26.26 KG/M2

## 2017-05-30 DIAGNOSIS — O09.293: Primary | ICD-10-CM

## 2017-05-30 LAB
ABO + RH BLD: NORMAL
BASOPHILS # BLD AUTO: 0.01 K/UL
BASOPHILS NFR BLD: 0.1 %
BLD GP AB SCN CELLS X3 SERPL QL: NORMAL
DIFFERENTIAL METHOD: ABNORMAL
EOSINOPHIL # BLD AUTO: 0 K/UL
EOSINOPHIL NFR BLD: 0.1 %
ERYTHROCYTE [DISTWIDTH] IN BLOOD BY AUTOMATED COUNT: 13.5 %
HCT VFR BLD AUTO: 35.3 %
HGB BLD-MCNC: 12.3 G/DL
LYMPHOCYTES # BLD AUTO: 1.2 K/UL
LYMPHOCYTES NFR BLD: 10.5 %
MCH RBC QN AUTO: 30.7 PG
MCHC RBC AUTO-ENTMCNC: 34.8 %
MCV RBC AUTO: 88 FL
MONOCYTES # BLD AUTO: 0.7 K/UL
MONOCYTES NFR BLD: 6.4 %
NEUTROPHILS # BLD AUTO: 9.5 K/UL
NEUTROPHILS NFR BLD: 83.2 %
PLATELET # BLD AUTO: 123 K/UL
PMV BLD AUTO: 10 FL
RBC # BLD AUTO: 4.01 M/UL
WBC # BLD AUTO: 11.49 K/UL

## 2017-05-30 PROCEDURE — 99213 OFFICE O/P EST LOW 20 MIN: CPT | Mod: TH,S$PBB,, | Performed by: OBSTETRICS & GYNECOLOGY

## 2017-05-30 PROCEDURE — 72200004 HC VAGINAL DELIVERY LEVEL I

## 2017-05-30 PROCEDURE — 86900 BLOOD TYPING SEROLOGIC ABO: CPT

## 2017-05-30 PROCEDURE — 72100002 HC LABOR CARE, 1ST 8 HOURS

## 2017-05-30 PROCEDURE — 86901 BLOOD TYPING SEROLOGIC RH(D): CPT

## 2017-05-30 PROCEDURE — 10907ZC DRAINAGE OF AMNIOTIC FLUID, THERAPEUTIC FROM PRODUCTS OF CONCEPTION, VIA NATURAL OR ARTIFICIAL OPENING: ICD-10-PCS | Performed by: ADVANCED PRACTICE MIDWIFE

## 2017-05-30 PROCEDURE — 25000003 PHARM REV CODE 250: Performed by: ADVANCED PRACTICE MIDWIFE

## 2017-05-30 PROCEDURE — 85025 COMPLETE CBC W/AUTO DIFF WBC: CPT

## 2017-05-30 PROCEDURE — 99999 PR PBB SHADOW E&M-EST. PATIENT-LVL II: CPT | Mod: PBBFAC,,, | Performed by: OBSTETRICS & GYNECOLOGY

## 2017-05-30 PROCEDURE — 11000001 HC ACUTE MED/SURG PRIVATE ROOM

## 2017-05-30 RX ORDER — ONDANSETRON 8 MG/1
8 TABLET, ORALLY DISINTEGRATING ORAL EVERY 8 HOURS PRN
Status: DISCONTINUED | OUTPATIENT
Start: 2017-05-30 | End: 2017-05-30

## 2017-05-30 RX ORDER — MISOPROSTOL 200 UG/1
600 TABLET ORAL
Status: DISCONTINUED | OUTPATIENT
Start: 2017-05-30 | End: 2017-05-30

## 2017-05-30 RX ORDER — ACETAMINOPHEN 325 MG/1
650 TABLET ORAL EVERY 6 HOURS PRN
Status: DISCONTINUED | OUTPATIENT
Start: 2017-05-30 | End: 2017-06-01 | Stop reason: HOSPADM

## 2017-05-30 RX ORDER — SODIUM CHLORIDE, SODIUM LACTATE, POTASSIUM CHLORIDE, CALCIUM CHLORIDE 600; 310; 30; 20 MG/100ML; MG/100ML; MG/100ML; MG/100ML
INJECTION, SOLUTION INTRAVENOUS CONTINUOUS
Status: DISCONTINUED | OUTPATIENT
Start: 2017-05-30 | End: 2017-05-30

## 2017-05-30 RX ORDER — OXYTOCIN/RINGER'S LACTATE 20/1000 ML
41.65 PLASTIC BAG, INJECTION (ML) INTRAVENOUS CONTINUOUS
Status: ACTIVE | OUTPATIENT
Start: 2017-05-30 | End: 2017-05-30

## 2017-05-30 RX ORDER — HYDROCORTISONE 25 MG/G
CREAM TOPICAL 3 TIMES DAILY PRN
Status: DISCONTINUED | OUTPATIENT
Start: 2017-05-30 | End: 2017-06-01 | Stop reason: HOSPADM

## 2017-05-30 RX ORDER — DIPHENHYDRAMINE HYDROCHLORIDE 50 MG/ML
25 INJECTION INTRAMUSCULAR; INTRAVENOUS EVERY 4 HOURS PRN
Status: DISCONTINUED | OUTPATIENT
Start: 2017-05-30 | End: 2017-06-01 | Stop reason: HOSPADM

## 2017-05-30 RX ORDER — OXYCODONE AND ACETAMINOPHEN 10; 325 MG/1; MG/1
1 TABLET ORAL EVERY 4 HOURS PRN
Status: DISCONTINUED | OUTPATIENT
Start: 2017-05-30 | End: 2017-06-01 | Stop reason: HOSPADM

## 2017-05-30 RX ORDER — PRENATAL WITH FERROUS FUM AND FOLIC ACID 3080; 920; 120; 400; 22; 1.84; 3; 20; 10; 1; 12; 200; 27; 25; 2 [IU]/1; [IU]/1; MG/1; [IU]/1; MG/1; MG/1; MG/1; MG/1; MG/1; MG/1; UG/1; MG/1; MG/1; MG/1; MG/1
1 TABLET ORAL DAILY
Status: DISCONTINUED | OUTPATIENT
Start: 2017-05-30 | End: 2017-06-01 | Stop reason: HOSPADM

## 2017-05-30 RX ORDER — OXYTOCIN/RINGER'S LACTATE 20/1000 ML
2 PLASTIC BAG, INJECTION (ML) INTRAVENOUS CONTINUOUS
Status: DISCONTINUED | OUTPATIENT
Start: 2017-05-30 | End: 2017-05-30

## 2017-05-30 RX ORDER — ONDANSETRON 8 MG/1
8 TABLET, ORALLY DISINTEGRATING ORAL EVERY 8 HOURS PRN
Status: DISCONTINUED | OUTPATIENT
Start: 2017-05-30 | End: 2017-06-01 | Stop reason: HOSPADM

## 2017-05-30 RX ORDER — OXYCODONE AND ACETAMINOPHEN 5; 325 MG/1; MG/1
1 TABLET ORAL EVERY 4 HOURS PRN
Status: DISCONTINUED | OUTPATIENT
Start: 2017-05-30 | End: 2017-06-01 | Stop reason: HOSPADM

## 2017-05-30 RX ORDER — SIMETHICONE 80 MG
1 TABLET,CHEWABLE ORAL EVERY 6 HOURS PRN
Status: DISCONTINUED | OUTPATIENT
Start: 2017-05-30 | End: 2017-06-01 | Stop reason: HOSPADM

## 2017-05-30 RX ORDER — ZOLPIDEM TARTRATE 5 MG/1
5 TABLET ORAL NIGHTLY PRN
Status: DISCONTINUED | OUTPATIENT
Start: 2017-05-30 | End: 2017-06-01 | Stop reason: HOSPADM

## 2017-05-30 RX ORDER — IBUPROFEN 600 MG/1
600 TABLET ORAL EVERY 6 HOURS
Status: DISCONTINUED | OUTPATIENT
Start: 2017-05-30 | End: 2017-06-01 | Stop reason: HOSPADM

## 2017-05-30 RX ORDER — DOCUSATE SODIUM 100 MG/1
100 CAPSULE, LIQUID FILLED ORAL DAILY PRN
Status: DISCONTINUED | OUTPATIENT
Start: 2017-05-30 | End: 2017-06-01 | Stop reason: HOSPADM

## 2017-05-30 RX ADMIN — OXYCODONE HYDROCHLORIDE AND ACETAMINOPHEN 1 TABLET: 10; 325 TABLET ORAL at 02:05

## 2017-05-30 RX ADMIN — SODIUM CHLORIDE, SODIUM LACTATE, POTASSIUM CHLORIDE, AND CALCIUM CHLORIDE: .6; .31; .03; .02 INJECTION, SOLUTION INTRAVENOUS at 01:05

## 2017-05-30 RX ADMIN — Medication 2 MILLI-UNITS/MIN: at 01:05

## 2017-05-30 RX ADMIN — IBUPROFEN 600 MG: 600 TABLET, FILM COATED ORAL at 06:05

## 2017-05-30 NOTE — SUBJECTIVE & OBJECTIVE
Obstetric HPI:  Patient reports Date/time of onset: 17 in the evening contractions, active fetal movement, No vaginal bleeding , No loss of fluid     This pregnancy has been complicated by incompetent cervix.     Obstetric History       T2      L4     SAB0   TAB0   Ectopic0   Multiple0   Live Births6       # Outcome Date GA Lbr Andres/2nd Weight Sex Delivery Anes PTL Lv   7 Current            6 Term 14 38w0d   M Vag-Spont   KOFI      Complications: Cervical cerclage suture present   5 Term 13 38w0d   M Vag-Spont   KOFI      Complications: Cervical cerclage suture present   4 SAB 12 20w0d   F SAB   ND   3  10/31/09 36w0d   F Vag-Spont   KOFI      Complications: Cervical cerclage suture present   2  05 36w0d   F Vag-Spont   KOFI      Complications: Cervical cerclage suture present   1 SAB 00 20w0d   M    ND        Past Medical History:   Diagnosis Date    Anemia     H/O cervical cerclage, currently pregnant     Miscarriage      Past Surgical History:   Procedure Laterality Date    CERVICAL CERCLAGE         PTA Medications   Medication Sig    PRENATAL VIT/IRON FUMARATE/FA (PRENATAL 1+1 ORAL) Take by mouth.       Review of patient's allergies indicates:  No Known Allergies     Family History     Problem Relation (Age of Onset)    Diabetes Mother    Heart failure Father        Social History Main Topics    Smoking status: Never Smoker    Smokeless tobacco: Not on file    Alcohol use No    Drug use: No    Sexual activity: Not Currently     Partners: Male     Review of Systems   Constitutional: Negative.    Respiratory: Negative.    Cardiovascular: Negative.    Gastrointestinal: Negative.    Genitourinary: Negative.    Neurological: Negative.       Objective:     Vital Signs (Most Recent):    Vital Signs (24h Range):  BP: (116)/(70) 116/70        There is no height or weight on file to calculate BMI.    FHT: 140 bpm; moderate variability Cat 1  (reassuring)  TOCO:  Q 3-6 minutes    Physical Exam:   Constitutional: She is oriented to person, place, and time. She appears well-developed and well-nourished.       Cardiovascular: Normal rate and regular rhythm.     Pulmonary/Chest: Effort normal and breath sounds normal.        Abdominal: Soft. Bowel sounds are normal.   gravid     Genitourinary: Vagina normal.           Musculoskeletal: Normal range of motion and moves all extremeties.       Neurological: She is alert and oriented to person, place, and time.     Psychiatric: She has a normal mood and affect. Her behavior is normal.       Cervix:  Dilation:  7  Effacement:  80%  Station: 0  Presentation: Vertex     Significant Labs:  Lab Results   Component Value Date    GROUPTRH AB POS 05/16/2017    HEPBSAG Negative 11/18/2016    STREPBCULT No Group B Streptococcus isolated 05/02/2017       I have personallly reviewed all pertinent lab results from the last 24 hours.

## 2017-05-30 NOTE — LACTATION NOTE
Lactation Rounds:     Mother states that she has tried to breastfeed all of her infants and has not been successful. She states that infants have trouble latching, she has tried nipple shields, pumping etc. Reports her milk comes in but does not flow out and then milk dries up very quickly.    Infant skin to skin with mother. Areolas are small in diameter. Left nipple dimpled & possibly brifrucated, right nipple noted to be bifurcated.Infant attempts to latch to both sides in the cross cradle position. Infant does not open mouth widely. Infant will occasionally  latch to the breast, suckle 2-3 burst and then push nipple out of the mouth, no signs of transfer at the breast noted.     Suck assessment reveals disorganized suck. Infant with frequent biting; will place tongue over the gumline but will pull back behind the gumline with suckling. Suck does not improve with suck training exercises.    Discussed adequacy of colostrum. Discussed risks and implications of early introduction of artifical nipples and formula that is not medically indicated. Encouraged mother to feed with alternative feeding methods if she chooses to supplement with formula. Discussed mechanism of milk production and maintenance. Mother has a double electric breast pump at home. Encouraged mother to pump both breast each time infant receives formula, if she chooses to supplement or medically indicated.    Infant fed all available EBM collected from hand expression, drops given to infant on gloved finger. Infant left skin to skin, feeding cues still present, infant will not latch, no further EBM available. Mother wishes not to supplement infant formula at this time. Encouraged mother to delay infant bath at this time.    Infant's primary nurse Joni Rocha & mothers primary nurse Jocelynn Sewell update.    Lactation packet given and admit information reviewed. Mother verbalizes understanding of expected  behaviors and output for the first 48  hours of life.  Discussed the importance of cue based feedings on demand, unrestricted access to the breast, and frequent uninterrupted skin to skin contact.   Encouraged mother to call for assistance when desired or when infant is showing signs of hunger, contact number provided, mother verbalizes understanding.         05/30/17 2895   Infant Information   Infant's Name Bucky   Maternal Infant Assessment   Breast Shape Bilateral:;round   Breast Density Bilateral:;soft   Areola Bilateral:;elastic;other (see comments)  (small)   Nipple(s) Bilateral:;bifurcated;other (see comments)  (dimpled in center)   Infant Assessment   Sucking Reflex present   Rooting Reflex present   Swallow Reflex present   LATCH Score   Latch 1-->repeated attempts, holds nipple in mouth, stimulate to suck   Audible Swallowing 0-->none   Type Of Nipple 2-->everted (after stimulation)   Comfort (Breast/Nipple) 2-->soft/nontender   Hold (Positioning) 0-->full assist (staff holds infant at breast)   Score (less than 7 for 2/more consecutive times, consult Lactation Consultant) 5   Maternal Infant Feeding   Maternal Emotional State assist needed;relaxed   Infant Positioning cross-cradle  (right and left breast)   Signs of Milk Transfer (none)   Presence of Pain no   Comfort Measures Following Feeding air-drying encouraged   Nipple Shape After Feeding, Left WNL   Nipple Shape After Feeding, Right WNL   Latch Assistance no   Breastfeeding Education adequate infant intake;adequate milk volume;importance of skin-to-skin contact;milk expression, hand   Breastfeeding History   Breastfeeding History yes   Previous Exclusive Breastfeeding no   Previous Breastfeeding Success unsuccessful   Infant First Feeding   Skin-to-Skin Contact Maintained   Feeding Infant   Feeding Readiness Cues sighing   Effective Latch During Feeding no   Audible Swallow no   Lactation Interventions   Attachment Promotion breastfeeding assistance provided;counseling  provided;infant-mother separation minimized;skin-to-skin contact encouraged;rooming-in promoted   Breast Care: Breastfeeding milk massaged towards nipple   Breastfeeding Assistance assisted with positioning;both breasts offered each feeding;feeding cue recognition promoted;feeding on demand promoted;feeding session observed;infant latch-on verified;support offered;supplemental feeding provided   Maternal Breastfeeding Support lactation counseling provided;encouragement offered   Latch Promotion positioning assisted;infant moved to breast;suck stimulated with colostrum drop

## 2017-05-30 NOTE — PROGRESS NOTES
C/o increased contractions  +fetal movement, no srom, no vaginal bleeding  Sent to l&d for delivery  L&d notified

## 2017-05-30 NOTE — PROGRESS NOTES
"Discussed feeding choice with mother.  Reviewed benefits of breastfeeding.  Patient given "What to Expect in the First 48 Hours" handout. Mother states her intention is to breast feed infant.  "

## 2017-05-31 PROCEDURE — 11000001 HC ACUTE MED/SURG PRIVATE ROOM

## 2017-05-31 PROCEDURE — 25000003 PHARM REV CODE 250: Performed by: ADVANCED PRACTICE MIDWIFE

## 2017-05-31 RX ADMIN — IBUPROFEN 600 MG: 600 TABLET, FILM COATED ORAL at 12:05

## 2017-05-31 RX ADMIN — IBUPROFEN 600 MG: 600 TABLET, FILM COATED ORAL at 11:05

## 2017-05-31 RX ADMIN — PRENATAL WITH FERROUS FUM AND FOLIC ACID 1 EACH: 3080; 920; 120; 400; 22; 1.84; 3; 20; 10; 1; 12; 200; 27; 25; 2 TABLET ORAL at 08:05

## 2017-05-31 RX ADMIN — IBUPROFEN 600 MG: 600 TABLET, FILM COATED ORAL at 05:05

## 2017-05-31 RX ADMIN — IBUPROFEN 600 MG: 600 TABLET, FILM COATED ORAL at 06:05

## 2017-05-31 NOTE — LACTATION NOTE
"Lactation Rounds:    Weight loss 0.8%. Voids and stools WNL. Mother reports she has been syringe feeding formula and pumping but only getting a few drops of colostrum. Discussed with mother preferred alternative feeding methods, and reasons for syringe feeding. Mother chooses to supplement infant via bottle. Mother declines assistance with position and latch. States she is comfortable with current plan. Mother states " it would be a waste of your time"  to try to latch and position infant. Support and encouragement offered. Mother reports she has a history of milk not coming in after colostrum phase. Primary nurse updated on mothers feeding plan and choice. Mother verbalizes understanding on what to expect from  on day 2.   "

## 2017-05-31 NOTE — SUBJECTIVE & OBJECTIVE
Interval History:  Ebony Rodriguez is a 32 y.o. female status post Spontaneous vaginal delivery on 5/30/17 01:51 PM  at 39w5d. Patient is doing well today. She denies  nausea, vomiting, fever or chills.  Patient reports mild abdominal pain that is well relieved by  oral pain medications. Vaginal bleeding is moderate. Patient is voiding without difficulty and ambulating with no difficulty. She has passed flatus, and has not had BM.     Patient is breastfeeding.  She desires circumcision for her male baby: yes.     Objective:     Vital Signs (Most Recent):  Temp: 97.8 °F (36.6 °C) (05/31/17 0800)  Pulse: 65 (05/31/17 0800)  Resp: 18 (05/31/17 0800)  BP: 111/71 (05/31/17 0800) Vital Signs (24h Range):  Temp:  [97.8 °F (36.6 °C)-98.6 °F (37 °C)] 97.8 °F (36.6 °C)  Pulse:  [] 65  Resp:  [18-20] 18  BP: (105-155)/() 111/71     Weight: 61.7 kg (136 lb)  Body mass index is 26.56 kg/m².      Intake/Output Summary (Last 24 hours) at 05/31/17 1010  Last data filed at 05/31/17 0000   Gross per 24 hour   Intake             1400 ml   Output             1100 ml   Net              300 ml       Significant Labs:  Lab Results   Component Value Date    GROUPTRH AB POS 05/30/2017    HEPBSAG Negative 11/18/2016    STREPBCULT No Group B Streptococcus isolated 05/02/2017       Recent Labs  Lab 05/30/17  1014   HGB 12.3   HCT 35.3*       I have personallly reviewed all pertinent lab results from the last 24 hours.    Physical Exam:   Constitutional: She is oriented to person, place, and time. She appears well-developed and well-nourished.     Eyes: Conjunctivae are normal. Pupils are equal, round, and reactive to light.    Neck: Normal range of motion.    Cardiovascular: Normal rate and regular rhythm.     Pulmonary/Chest: Breath sounds normal.        Abdominal: Soft.     Genitourinary: Vagina normal and uterus normal.           Musculoskeletal: Normal range of motion and moves all extremeties.       Neurological: She is  alert and oriented to person, place, and time.    Skin: Skin is warm.    Psychiatric: She has a normal mood and affect. Her behavior is normal. Thought content normal.

## 2017-05-31 NOTE — PLAN OF CARE
Problem: Patient Care Overview  Goal: Plan of Care Review  Outcome: Ongoing (interventions implemented as appropriate)  Pt is progressing well. Pain is controlled with PO pain meds. Ambulates independently and voids without difficulty. Pt stated she has been trying to latch baby but he is not interested. Continues to syringe feed formula. Has been pumping but states she is not getting anything. Reassured her that she needs to pump every 3 hours to get stimulation.  Bonding well with baby. VSS. Will continue to monitor.

## 2017-05-31 NOTE — PLAN OF CARE
Problem: Patient Care Overview  Goal: Plan of Care Review  Outcome: Ongoing (interventions implemented as appropriate)  Plan of care reviewed with patient.  No acute distress noted.  Vital signs within normal range.  Bonding well with infant.  . Plan to be discharged tomorrow if stable

## 2017-05-31 NOTE — NURSING
Pt asked to be set up with a pump. States she has attempted to latch baby but he won't. She has been syringe feeding formula. [employer] Symphony pump at bedside. Instructed on proper usage and to adjust suction according to comfort level. Verified appropriate flange fit. Educated mother on frequency and duration of pumping in order to promote and maintain full milk supply. Hands on pumping technique reviewed. Encouraged hand expression after. Instructed mother on cleaning of breast pump parts. Reviewed proper milk handling, collection, storage, and transportation. Voices understanding.

## 2017-05-31 NOTE — PROGRESS NOTES
[facility] -   Obstetrics  Postpartum Progress Note    Patient Name: Ebony Rodriguez  MRN: [id number]  Admission Date: 2017  Hospital Length of Stay: 1 days  Attending Physician: Rosie Galvan MD  Primary Care Provider: Primary Doctor No    Subjective:     Principal Problem: (normal spontaneous vaginal delivery)    Interval History:  Ebony Rodriguez is a 32 y.o. female status post Spontaneous vaginal delivery on 17 01:51 PM  at 39w5d. Patient is doing well today. She denies  nausea, vomiting, fever or chills.  Patient reports mild abdominal pain that is well relieved by  oral pain medications. Vaginal bleeding is moderate. Patient is voiding without difficulty and ambulating with no difficulty. She has passed flatus, and has not had BM.     Patient is breastfeeding.  She desires circumcision for her male baby: yes.     Objective:     Vital Signs (Most Recent):  Temp: 97.8 °F (36.6 °C) (17 0800)  Pulse: 65 (17 0800)  Resp: 18 (17 0800)  BP: 111/71 (17 0800) Vital Signs (24h Range):  Temp:  [97.8 °F (36.6 °C)-98.6 °F (37 °C)] 97.8 °F (36.6 °C)  Pulse:  [] 65  Resp:  [18-20] 18  BP: (105-155)/() 111/71     Weight: 61.7 kg (136 lb)  Body mass index is 26.56 kg/m².      Intake/Output Summary (Last 24 hours) at 17 1010  Last data filed at 17 0000   Gross per 24 hour   Intake             1400 ml   Output             1100 ml   Net              300 ml       Significant Labs:  Lab Results   Component Value Date    GROUPTRH AB POS 2017    HEPBSAG Negative 2016    STREPBCULT No Group B Streptococcus isolated 2017       Recent Labs  Lab 17  1014   HGB 12.3   HCT 35.3*       I have personallly reviewed all pertinent lab results from the last 24 hours.    Physical Exam:   Constitutional: She is oriented to person, place, and time. She appears well-developed and well-nourished.     Eyes: Conjunctivae are normal. Pupils are  equal, round, and reactive to light.    Neck: Normal range of motion.    Cardiovascular: Normal rate and regular rhythm.     Pulmonary/Chest: Breath sounds normal.        Abdominal: Soft.     Genitourinary: Vagina normal and uterus normal.           Musculoskeletal: Normal range of motion and moves all extremeties.       Neurological: She is alert and oriented to person, place, and time.    Skin: Skin is warm.    Psychiatric: She has a normal mood and affect. Her behavior is normal. Thought content normal.       Assessment/Plan:     32 y.o. female  for:    *  (normal spontaneous vaginal delivery)    A normal pp course  Breastfeeding  P discharge home tomorrow            Disposition: As patient meets milestones, will plan to discharge tomorrow.    Jazmyne New CNM  Obstetrics  [facility]

## 2017-05-31 NOTE — L&D DELIVERY NOTE
Delivery Information for  Vladimir Rodriguez    Birth information:  YOB: 2017   Time of birth: 1:51 PM   Sex: male   Head Delivery Date/Time: 2017  1:51 PM   Delivery type: Vaginal, Spontaneous Delivery   Gestational Age: 39w5d    Delivery Providers    Delivering clinician:  Sharon Lema CNM   Other personnel:   Provider Role   Jocelynn Sewell, RN Registered Nurse   Joni Cheema, RN Registered Nurse                  Collins Measurements    Weight:  3035 g Length:  48.5 cm   Head circum.:  35 cm Chest circum.:  33 cm   Abdominal girth:  32 cm          Collins Assessment    Living status:  Living   Apgars:      1 Minute:   5 Minute:   10 Minute:   15 Minute:   20 Minute:     Skin Color:   1  1       Heart Rate:   2  2       Reflex Irritability:   2  2       Muscle Tone:   2  2       Respiratory Effort:   2  2       Total:   9  9                  Apgars Assigned By:  LCUA CHEEMA RN          Assisted Delivery Details:    Forceps attempted?:  No   Vacuum extractor attempted?:  No             Shoulder Dystocia    Shoulder dystocia present?:  No            Presentation and Position    Presentation:   Vertex   Position:       Occiput    Anterior               Interventions/Resuscitation    Method:  Bulb Suctioning, Tactile Stimulation        Cord    Vessels:  3 vessels   Complications:  None   Delayed Cord Clamping?:  Yes   Cord Clamped Date/Time:  2017  1:53 PM   Gases Sent?:  No   Cord Comments:  short cord   Stem Cell Collection (by MD):  No        Placenta    Date and time:  2017  2:03 PM   Removal:  Spontaneous   Appearance:  Intact   Placenta disposition:  discarded            Labor Events:       labor: No     Labor Onset Date/Time:         Dilation Complete Date/Time:         Start Pushing Date/Time:       Rupture Date/Time:              Rupture type:           Fluid Amount:        Fluid Color:        Fluid Odor:        Membrane Status (PeriCalm): ARM (Artificial  Rupture)      Rupture Date/Time (PeriCalm): 2017 11:24:00      Fluid Amount (PeriCalm): Small      Fluid Color (PeriCalm): Clear       steroids:       Antibiotics given for GBS: No     Induction: none     Indications for induction:        Augmentation: oxytocin     Indications for augmentation: Ineffective Contraction Pattern     Labor complications: None     Additional complications:          Cervical ripening:                     Delivery:      Episiotomy: None     Indication for Episiotomy:       Perineal Lacerations: None Repaired:      Periurethral Laceration: none Repaired:     Labial Laceration: none Repaired:     Sulcus Laceration: none Repaired:     Vaginal Laceration: No Repaired:     Cervical Laceration: No Repaired:     Repair suture: None     Repair # of packets:       Vaginal delivery QBL (mL): 250      QBL (mL): 0     Combined Blood Loss (mL): 250     Vaginal Sweep Performed:       Surgicount Correct:         Other providers:       Anesthesia    Method:  None              Details (if applicable):  Trial of Labor      Categorization:      Priority:     Indications for :     Incision Type:       Additional  information:  Forceps:    Vacuum:    Breech:    Observed anomalies    Other (Comments): Patient with no epidural per her choice, directed pushing effort. Difficulty maintaining composure during .    of a viable male infant over an intact perineum. Head delivered per maternal pushing effort, no nuchal cord, anterior shoulder d  elivered spontaneously followed by posterior shoulder. Vigorous infant suctioned and dried before placing on mother's abdomen per mother's request.Cord clamped X 2 and cut shortly after delivery, also per mother's request. 3VC. Unable to collect cord   blood. Large gush of blood noted, placenta partially  but bleeding stopped. Instructed to push with next cramp. Placenta delivered intact and  spontaneously via [place]. Fundus firm, bleeding light. Vagina and perineum intact.  mL. In  christina skin-to-skin with mother. Apgars 9/9. Weight and measurements pending.     LUANN Klein/MARCELA Diaz

## 2017-06-01 VITALS
WEIGHT: 136 LBS | TEMPERATURE: 98 F | BODY MASS INDEX: 26.7 KG/M2 | SYSTOLIC BLOOD PRESSURE: 126 MMHG | HEART RATE: 85 BPM | RESPIRATION RATE: 18 BRPM | DIASTOLIC BLOOD PRESSURE: 88 MMHG | HEIGHT: 60 IN

## 2017-06-01 PROCEDURE — 25000003 PHARM REV CODE 250: Performed by: ADVANCED PRACTICE MIDWIFE

## 2017-06-01 RX ADMIN — IBUPROFEN 600 MG: 600 TABLET, FILM COATED ORAL at 06:06

## 2017-06-01 RX ADMIN — PRENATAL WITH FERROUS FUM AND FOLIC ACID 1 EACH: 3080; 920; 120; 400; 22; 1.84; 3; 20; 10; 1; 12; 200; 27; 25; 2 TABLET ORAL at 08:06

## 2017-06-01 RX ADMIN — IBUPROFEN 600 MG: 600 TABLET, FILM COATED ORAL at 12:06

## 2017-06-01 NOTE — DISCHARGE INSTRUCTIONS
"Mother Self Care:    Activity: Avoid strenuous exercise and get adequate rest.  No driving until the physician consent given.  Emotional Changes: Most women find birth to be a time of great emotional upheaval.  Sense of loss, mood swings, fatigue, anxiety, and feeling "let down" are common.  If feelings worsen or last more than a week, call your physician.  Breast Care/Breastfeeding: Wear a bra for comfort.  Keep nipples dry and apply your own breast milk or lanolin cream as needed for soreness.  Engorgement can be relieved with warm, moist heat before feedings.  You may also take Ibuprofen.  Breast Care/Bottle Feeding: Wear support bra 24 hours a day for one week.  Avoid stimulation to breasts.  You may use ice packs for discomfort.  Faustino-Care/Vaginal Bleeding: Remember to use your faustino-bottle after urinating.  Your flow will change from red, to pink, to yellow/white color over a period of 2 weeks.  Menstruation will return in 3-8 weeks, or longer if breastfeeding.  Episiotomy Vaginal Delivery: Stitches will dissolve within 10 days to 3 weeks.  Warm baths, tucks, and dermoplast will promote healing.  Avoid bubble baths or strong soaps.   Section/Tubal Ligation: Keep incision clean and dry.  Please remove steri-strips in 5-7 days.  You may shower, but avoid baths.  Sexual Activity/Pelvic Rest: No sexual activity, tampons, or douching until your physician gives you consent.  Diet: Continue to eat from the five basic food groups, including plenty of protein, fruits, vegetables, and whole grains.  Limit empty calories and high fat foods.  Drink enough fluids to satisfy thirst and add an extra 500 calories for breastfeeding.  Constipation/Hemorrhoids: Drink plenty of water.  You may take a stool softener or natural laxative (Metamucil). You may use tucks or hemorrhoid ointment and soak in a warm tub.    CALL YOUR OB DOCTOR IF ANY OF THE FOLLOWING OCCURS:  *Heavy bleeding - saturating a pad an hour or passing any " large (2-3 inches in size) blood clots.  *Any pain, redness, or tenderness in lower leg.  *You cannot care for yourself or your baby.  *Any signs of infection-      - Temperature greater than 100.5 degrees F      - Foul smelling vaginal discharge and/or incisional drainage      - Increased episiotomy or incisional pain      - Hot, hard, red or sore area on breast      - Flu-like symptoms      - Any urgency, frequency or burning with urination

## 2017-06-01 NOTE — DISCHARGE SUMMARY
[facility] -   Obstetrics  Discharge Summary      Patient Name: Ebony Rodriguez  MRN: [id number]  Admission Date: 2017  Hospital Length of Stay: 2 days  Discharge Date and Time:  2017 8:39 AM  Attending Physician: Rosie Galvan MD   Discharging Provider: Maty Momin CNM  Primary Care Provider: Primary Doctor No    HPI: Presents from Dr Lema' office with advanced cervical dilation; reports contractions but no pain.     * No surgery found *     Hospital Course:    routine pp care        Final Active Diagnoses:    Diagnosis Date Noted POA    PRINCIPAL PROBLEM:   (normal spontaneous vaginal delivery) [O80] 2017 Not Applicable    Single liveborn [Z38.2] 2017 No      Problems Resolved During this Admission:    Diagnosis Date Noted Date Resolved POA    Indication for care in labor and delivery, antepartum [O75.9] 2017 Yes            Feeding Method: breast    Immunizations     Date Immunization Status Dose Route/Site Given by    17 0719 MMR Deferred 0.5 mL Subcutaneous/Left deltoid Lisa Fung RN          Delivery:    Episiotomy: None   Lacerations: None   Repair suture: None   Repair # of packets:     Blood loss (ml): 250     Birth information:  YOB: 2017   Time of birth: 1:51 PM   Sex: male   Delivery type: Vaginal, Spontaneous Delivery   Gestational Age: 39w5d    Delivery Clinician:      Other providers:       Additional  information:  Forceps:    Vacuum:    Breech:    Observed anomalies      Living?:           APGARS  One minute Five minutes Ten minutes   Skin color:         Heart rate:         Grimace:         Muscle tone:         Breathing:         Totals: 9  9        Placenta: Delivered:       appearance    Pending Diagnostic Studies:     None          Discharged Condition: good    Disposition: Home or Self Care    Follow Up:  Follow-up Information     Radha Romero CNM. Go on 2017.    Specialty:  Obstetrics and  Gynecology  Why:  postpartum follow up  Contact information:  [address]  [place] [address]  [phone]                 Patient Instructions:     Diet general     Call MD for:  temperature >100.4     Call MD for:  severe uncontrolled pain       Medications:  Current Discharge Medication List      CONTINUE these medications which have NOT CHANGED    Details   PRENATAL VIT/IRON FUMARATE/FA (PRENATAL 1+1 ORAL) Take by mouth.             Maty Momin CNM  Obstetrics  [facility]

## 2017-06-01 NOTE — PROGRESS NOTES
Lactation Rounds:    Weight loss -2.8%. 5 voids and 0 stools reflected in chart in past 24 hours. Hand expression, nipple care, and breast massage and compression reviewed. Upon arrival infant is swaddled and sleeping. Mother reports he just got circumcised. Mother plans to continue pumping 8-12 times, give infant EBM via bottle, and give formula via bottle. Mother reports pediatrician said infant has a possible tongue tie. Referred mother to community resources. Mother has plans to get infant revised and try to put infant back to breast after revision.  Mother has a breast pump at home. Reviewed pumping basics. Mother verbalized understanding. Lactation discharge information reviewed.  Mother is aware of warm line, and outpatient consultations and monthly support gatherings. Encouraged mother to contact lactation with any questions, concerns, or problems. Contact numbers provided, and mother verbalizes understanding.        06/01/17 0945   Maternal Infant Assessment   Breast Shape Bilateral:;round   Breast Density Bilateral:;soft   Areola Bilateral:;dense   Nipple(s) Bilateral:;bifurcated;bulbous;everted   Infant Assessment   Weight Loss (%) 2.8   Tongue/Frenulum Symptoms frenulum tight   Number of Stools (24 hours) 0   Number of Voids (24 hours) 5   Maternal Infant Feeding   Maternal Emotional State relaxed   Lactation Referrals   Lactation Consult Follow up;Pump teaching   Lactation Interventions   Attachment Promotion skin-to-skin contact encouraged;rooming-in promoted;role responsibility promoted;privacy provided;infant-mother separation minimized;family involvement promoted;face-to-face positioning promoted;counseling provided;environment adjusted;breastfeeding assistance provided   Breastfeeding Assistance support offered;feeding cue recognition promoted;feeding on demand promoted;milk expression/pumping   Maternal Breastfeeding Support maternal rest encouraged;maternal nutrition promoted;maternal hydration  promoted;lactation counseling provided;infant-mother separation minimized   Latch Promotion suck stimulated with colostrum drop;infant moved to breast;positioning assisted

## 2017-06-01 NOTE — PROGRESS NOTES
Discharge instructions given and reviewed with pt. Questions answered at this time. Pt verbalized understanding. Vss. Awaiting ride at this time.

## 2017-06-01 NOTE — PLAN OF CARE
Problem: Patient Care Overview  Goal: Plan of Care Review  Outcome: Ongoing (interventions implemented as appropriate)  Pt is progressing well. Pain is controlled with scheduled Motrin. Does not complain of any pain. Ambulates independently and voids without difficulty. Pumped once this shift and gave baby colostrum collected. Has been formula feeding otherwise. Bonding well with baby. Desires to be discharged today. VSS. Will continue to monitor.

## 2017-06-01 NOTE — H&P
[facility] -   Obstetrics  History & Physical    Patient Name: Ebony Rodriguez  MRN: [id number]  Admission Date: 2017  Primary Care Provider: Primary Doctor No    Subjective:     Principal Problem: (normal spontaneous vaginal delivery)    History of Present Illness: Presented from clinic 8cm, reports no pain.    Obstetric HPI:  Patient reports None contractions, active fetal movement, No vaginal bleeding , No loss of fluid     This pregnancy has been complicated by grand multiparity, history of incompetent cervix and  labor.    Obstetric History       T3      L5     SAB0   TAB0   Ectopic0   Multiple0   Live Births7       # Outcome Date GA Lbr Andres/2nd Weight Sex Delivery Anes PTL Lv   7 Term 17 39w5d  3.035 kg (6 lb 11.1 oz) M Vag-Spont None N KOFI      Name: JAZLYN RODRIGUEZ      Apgar1:  9                Apgar5: 9   6 Term 14 38w0d   M Vag-Spont   KOFI      Complications: Cervical cerclage suture present   5 Term 13 38w0d   M Vag-Spont   KOFI      Complications: Cervical cerclage suture present   4 SAB 12 20w0d   F SAB   ND   3  10/31/09 36w0d   F Vag-Spont   KOFI      Complications: Cervical cerclage suture present   2  05 36w0d   F Vag-Spont   KOFI      Complications: Cervical cerclage suture present   1 SAB 00 20w0d   M    ND        Past Medical History:   Diagnosis Date    Anemia     H/O cervical cerclage, currently pregnant     Miscarriage      Past Surgical History:   Procedure Laterality Date    CERVICAL CERCLAGE         PTA Medications   Medication Sig    PRENATAL VIT/IRON FUMARATE/FA (PRENATAL 1+1 ORAL) Take by mouth.       Review of patient's allergies indicates:  No Known Allergies     Family History     Problem Relation (Age of Onset)    Diabetes Mother    Heart failure Father        Social History Main Topics    Smoking status: Never Smoker    Smokeless tobacco: Not on file    Alcohol use No    Drug  use: No    Sexual activity: Not Currently     Partners: Male     Review of Systems   Objective:     Vital Signs (Most Recent):  Temp: 98.6 °F (37 °C) (17 0800)  Pulse: 77 (17 0800)  Resp: 18 (17 0800)  BP: 116/76 (17 0800) Vital Signs (24h Range):  Temp:  [98 °F (36.7 °C)-98.7 °F (37.1 °C)] 98.6 °F (37 °C)  Pulse:  [66-78] 77  Resp:  [18] 18  BP: (114-122)/(61-78) 116/76     Weight: 61.7 kg (136 lb)  Body mass index is 26.56 kg/m².    FHT: 145 Cat 1 (reassuring)  TOCO:  Q 10-15 minutes    Physical Exam    Cervix:  Dilation:  7  Effacement:  80 %  Station: 0  Presentation: Vertex     Significant Labs:  Lab Results   Component Value Date    GROUPTRH AB POS 2017    HEPBSAG Negative 2016    STREPBCULT No Group B Streptococcus isolated 2017       I have personallly reviewed all pertinent lab results from the last 24 hours.    Assessment/Plan:     32 y.o. female  at 39w5d for:    Active Diagnoses:    Diagnosis Date Noted POA    PRINCIPAL PROBLEM:   (normal spontaneous vaginal delivery) [O80] 2017 Not Applicable    Single liveborn [Z38.2] 2017 No      Problems Resolved During this Admission:    Diagnosis Date Noted Date Resolved POA    Indication for care in labor and delivery, antepartum [O75.9] 2017 Yes       Plan AROM and anticipate vaginal birth.   Pt desires NCB, no water birth.     Sharon Lema CNM  Obstetrics  [facility]

## 2017-06-30 ENCOUNTER — POSTPARTUM VISIT (OUTPATIENT)
Dept: OBSTETRICS AND GYNECOLOGY | Facility: CLINIC | Age: 32
End: 2017-06-30
Payer: MEDICAID

## 2017-06-30 VITALS
DIASTOLIC BLOOD PRESSURE: 72 MMHG | BODY MASS INDEX: 23.81 KG/M2 | SYSTOLIC BLOOD PRESSURE: 123 MMHG | HEIGHT: 60 IN | WEIGHT: 121.25 LBS

## 2017-06-30 DIAGNOSIS — Z30.09 BIRTH CONTROL COUNSELING: ICD-10-CM

## 2017-06-30 DIAGNOSIS — Z30.9 ENCOUNTER FOR CONTRACEPTIVE MANAGEMENT, UNSPECIFIED TYPE: Primary | ICD-10-CM

## 2017-06-30 PROCEDURE — 99212 OFFICE O/P EST SF 10 MIN: CPT | Mod: PBBFAC,PN | Performed by: OBSTETRICS & GYNECOLOGY

## 2017-06-30 PROCEDURE — 99999 PR PBB SHADOW E&M-EST. PATIENT-LVL II: CPT | Mod: PBBFAC,,, | Performed by: OBSTETRICS & GYNECOLOGY

## 2017-06-30 RX ORDER — NORETHINDRONE ACETATE AND ETHINYL ESTRADIOL .03; 1.5 MG/1; MG/1
1 TABLET ORAL DAILY
Qty: 28 EACH | Refills: 12 | Status: SHIPPED | OUTPATIENT
Start: 2017-06-30 | End: 2018-05-02

## 2017-07-10 PROBLEM — O99.891 BACK PAIN AFFECTING PREGNANCY IN THIRD TRIMESTER: Status: RESOLVED | Noted: 2017-05-21 | Resolved: 2017-07-10

## 2017-07-10 PROBLEM — M54.9 BACK PAIN AFFECTING PREGNANCY IN THIRD TRIMESTER: Status: RESOLVED | Noted: 2017-05-21 | Resolved: 2017-07-10

## 2017-07-10 PROBLEM — Z30.09 BIRTH CONTROL COUNSELING: Status: ACTIVE | Noted: 2017-06-30

## 2017-07-11 NOTE — PROGRESS NOTES
Subjective:       Ebony Rodriguez is a 32 y.o. female who presents for a postpartum visit. She is 4 weeks postpartum following a spontaneous vaginal delivery. I have fully reviewed the prenatal and intrapartum course. The delivery was at 39 gestational weeks. Outcome: spontaneous vaginal delivery. Anesthesia: epidural. Postpartum course has been uncomplicated.. Baby's course has been unremarkable.. Baby is feeding by bottle - Enfamil with Iron. Bleeding no bleeding. Bowel function is normal. Bladder function is normal. Patient is not sexually active. Contraception method is abstinence. Postpartum depression screening: negative.    Patient desires OCP's for contraception and has used in the past w/o problems. No CI's to same.    The following portions of the patient's history were reviewed and updated as appropriate: allergies, current medications, past family history, past medical history, past social history, past surgical history and problem list.    Review of Systems  A comprehensive review of systems was negative.     Objective:      /72   Ht 5' (1.524 m)   Wt 55 kg (121 lb 4.1 oz)   LMP 09/08/2016   BMI 23.68 kg/m²    General:  alert, appears stated age, cooperative and no distress    Breasts:  inspection negative, no nipple discharge or bleeding, no masses or nodularity palpable   Lungs: CTA   Heart:  regular rate and rhythm, S1, S2 normal, no murmur, click, rub or gallop   Abdomen: soft, non-tender; bowel sounds normal; no masses,  no organomegaly    Vulva:  normal   Vagina: normal vagina, no discharge, exudate, lesion, or erythema   Cervix:  no cervical motion tenderness   Corpus: normal size, contour, position, consistency, mobility, non-tender   Adnexa:  no mass, fullness, tenderness   Rectal Exam: Not performed.          Assessment:       Routine postpartum exam. Pap smear not done at today's visit.     Plan:      1. Contraception: abstinence  2. Desires OCP's- RX sent to pharmacy on file.  Advised may start pack on Sunday and take 1 pill daily as directed. Back up method of contraception for first pack.   3. Follow up in: 1 year  or as needed.

## 2017-07-14 ENCOUNTER — TELEPHONE (OUTPATIENT)
Dept: OBSTETRICS AND GYNECOLOGY | Facility: CLINIC | Age: 32
End: 2017-07-14

## 2017-07-14 NOTE — TELEPHONE ENCOUNTER
----- Message from Zelda Weinstein sent at 7/12/2017  3:05 PM CDT -----  Contact: Alex Johnson [phone]  Ask for Jessie Arauz#[id number] ,,, they need information on patient,,, please call back

## 2017-07-14 NOTE — TELEPHONE ENCOUNTER
Left messages for Jessie (Reference #[id number]) they need information on the patient. diana arrington        Caller: Alex (2 days ago,  3:05 PM)             Alex [phone]  Ask for Jessie Refe#[id number] ,,, they need information on patient,,, please call back

## 2017-07-21 ENCOUNTER — TELEPHONE (OUTPATIENT)
Dept: OBSTETRICS AND GYNECOLOGY | Facility: CLINIC | Age: 32
End: 2017-07-21

## 2017-07-21 NOTE — TELEPHONE ENCOUNTER
Left messages for the pt. to call back. diana arrington      Caller: ihbgli-aipcfrza-rcmnwhxu # [phone] (4 days ago,  2:05 PM)             Would like to consult with nurse to verify patients prior service. Please call back at [phone]

## 2017-07-21 NOTE — TELEPHONE ENCOUNTER
----- Message from Maite Jarquin sent at 7/17/2017  2:05 PM CDT -----  Contact: bcvzgg-lxpdqsqi-fbgsegxo # [phone]  Would like to consult with nurse to verify patients prior service. Please call back at [phone].      Thanks,  Maite Jarquin

## 2017-07-21 NOTE — TELEPHONE ENCOUNTER
----- Message from Maite Jarquin sent at 7/17/2017  2:05 PM CDT -----  Contact: pediik-xplsfrjc-gbwiiiyc # [phone]  Would like to consult with nurse to verify patients prior service. Please call back at [phone].      Thanks,  Maite Jarquin

## 2017-09-11 ENCOUNTER — TELEPHONE (OUTPATIENT)
Dept: OBSTETRICS AND GYNECOLOGY | Facility: CLINIC | Age: 32
End: 2017-09-11

## 2017-09-11 NOTE — TELEPHONE ENCOUNTER
----- Message from Bethany Reed sent at 9/11/2017 12:13 PM CDT -----  Call Jessie Chase at [phone]//REf# [id number]///regarding the  IUD  Insertion//robert sneed

## 2018-04-04 DIAGNOSIS — Z30.9 ENCOUNTER FOR CONTRACEPTIVE MANAGEMENT, UNSPECIFIED TYPE: ICD-10-CM

## 2018-05-01 ENCOUNTER — TELEPHONE (OUTPATIENT)
Dept: OBSTETRICS AND GYNECOLOGY | Facility: CLINIC | Age: 33
End: 2018-05-01

## 2018-05-01 NOTE — TELEPHONE ENCOUNTER
----- Message from Thomas Wetzel sent at 5/1/2018  1:40 PM CDT -----  Contact: pt   States she's rtn nurses call and can be reached at [phone]//thanks/dbw

## 2018-05-01 NOTE — TELEPHONE ENCOUNTER
----- Message from Nakia Nielsen sent at 5/1/2018 10:47 AM CDT -----  Mrs chandra shah wanted to come in to confirm preg test , she can be re back at [phone]

## 2018-05-02 ENCOUNTER — OFFICE VISIT (OUTPATIENT)
Dept: OBSTETRICS AND GYNECOLOGY | Facility: CLINIC | Age: 33
End: 2018-05-02
Payer: MEDICAID

## 2018-05-02 ENCOUNTER — LAB VISIT (OUTPATIENT)
Dept: LAB | Facility: HOSPITAL | Age: 33
End: 2018-05-02
Attending: ADVANCED PRACTICE MIDWIFE
Payer: MEDICAID

## 2018-05-02 VITALS
DIASTOLIC BLOOD PRESSURE: 78 MMHG | SYSTOLIC BLOOD PRESSURE: 124 MMHG | BODY MASS INDEX: 25.76 KG/M2 | HEIGHT: 60 IN | WEIGHT: 131.19 LBS

## 2018-05-02 DIAGNOSIS — Z32.01 PREGNANCY EXAMINATION OR TEST, POSITIVE RESULT: Primary | ICD-10-CM

## 2018-05-02 DIAGNOSIS — O26.841 UTERINE SIZE DATE DISCREPANCY, FIRST TRIMESTER: ICD-10-CM

## 2018-05-02 DIAGNOSIS — O09.299 HISTORY OF CERCLAGE, CURRENTLY PREGNANT: ICD-10-CM

## 2018-05-02 DIAGNOSIS — Z98.890 HISTORY OF CERCLAGE, CURRENTLY PREGNANT: ICD-10-CM

## 2018-05-02 DIAGNOSIS — Z32.01 PREGNANCY EXAMINATION OR TEST, POSITIVE RESULT: ICD-10-CM

## 2018-05-02 PROBLEM — Z30.09 BIRTH CONTROL COUNSELING: Status: RESOLVED | Noted: 2017-06-30 | Resolved: 2018-05-02

## 2018-05-02 LAB
ABO + RH BLD: NORMAL
BASOPHILS # BLD AUTO: 0.02 K/UL
BASOPHILS NFR BLD: 0.3 %
BLD GP AB SCN CELLS X3 SERPL QL: NORMAL
DIFFERENTIAL METHOD: ABNORMAL
EOSINOPHIL # BLD AUTO: 0 K/UL
EOSINOPHIL NFR BLD: 0.1 %
ERYTHROCYTE [DISTWIDTH] IN BLOOD BY AUTOMATED COUNT: 12.3 %
HCT VFR BLD AUTO: 37.5 %
HGB BLD-MCNC: 12.5 G/DL
IMM GRANULOCYTES # BLD AUTO: 0.02 K/UL
IMM GRANULOCYTES NFR BLD AUTO: 0.3 %
LYMPHOCYTES # BLD AUTO: 1.2 K/UL
LYMPHOCYTES NFR BLD: 17.2 %
MCH RBC QN AUTO: 29.5 PG
MCHC RBC AUTO-ENTMCNC: 33.3 G/DL
MCV RBC AUTO: 88 FL
MONOCYTES # BLD AUTO: 0.4 K/UL
MONOCYTES NFR BLD: 5.8 %
NEUTROPHILS # BLD AUTO: 5.3 K/UL
NEUTROPHILS NFR BLD: 76.3 %
NRBC BLD-RTO: 0 /100 WBC
PLATELET # BLD AUTO: 180 K/UL
PMV BLD AUTO: 10.6 FL
RBC # BLD AUTO: 4.24 M/UL
WBC # BLD AUTO: 6.92 K/UL

## 2018-05-02 PROCEDURE — 87491 CHLMYD TRACH DNA AMP PROBE: CPT

## 2018-05-02 PROCEDURE — 86703 HIV-1/HIV-2 1 RESULT ANTBDY: CPT

## 2018-05-02 PROCEDURE — 81025 URINE PREGNANCY TEST: CPT | Mod: PBBFAC,PN | Performed by: ADVANCED PRACTICE MIDWIFE

## 2018-05-02 PROCEDURE — 86762 RUBELLA ANTIBODY: CPT

## 2018-05-02 PROCEDURE — 87086 URINE CULTURE/COLONY COUNT: CPT

## 2018-05-02 PROCEDURE — 86850 RBC ANTIBODY SCREEN: CPT

## 2018-05-02 PROCEDURE — 99213 OFFICE O/P EST LOW 20 MIN: CPT | Mod: PBBFAC,TH,PN,25 | Performed by: ADVANCED PRACTICE MIDWIFE

## 2018-05-02 PROCEDURE — 87340 HEPATITIS B SURFACE AG IA: CPT

## 2018-05-02 PROCEDURE — 36415 COLL VENOUS BLD VENIPUNCTURE: CPT | Mod: PO

## 2018-05-02 PROCEDURE — 87077 CULTURE AEROBIC IDENTIFY: CPT

## 2018-05-02 PROCEDURE — 85025 COMPLETE CBC W/AUTO DIFF WBC: CPT

## 2018-05-02 PROCEDURE — 99204 OFFICE O/P NEW MOD 45 MIN: CPT | Mod: S$PBB,TH,, | Performed by: ADVANCED PRACTICE MIDWIFE

## 2018-05-02 PROCEDURE — 86592 SYPHILIS TEST NON-TREP QUAL: CPT

## 2018-05-02 PROCEDURE — 87186 SC STD MICRODIL/AGAR DIL: CPT

## 2018-05-02 PROCEDURE — 99999 PR PBB SHADOW E&M-EST. PATIENT-LVL III: CPT | Mod: PBBFAC,,, | Performed by: ADVANCED PRACTICE MIDWIFE

## 2018-05-02 PROCEDURE — 87088 URINE BACTERIA CULTURE: CPT

## 2018-05-02 NOTE — PROGRESS NOTES
Subjective:       Patient ID: Ebony Rodriguez is a 33 y.o. female.    Chief Complaint:  Possible Pregnancy    Patient's last menstrual period was 2018.  EGA 3w4d per LMP, HEATH 19  History of Present Illness  4 positive pregnancy tests at home, UPT in office today. pt was taking OCPs but 2 wks ago was put on antibiotics   She was not aware that the antibiotics would counteract the OCPs.     OB History    Para Term  AB Living   8 5 3 2 2 5   SAB TAB Ectopic Multiple Live Births   2 0 0 0 5      # Outcome Date GA Lbr Andres/2nd Weight Sex Delivery Anes PTL Lv   8 Current            7 Term 17 39w5d  3.035 kg (6 lb 11.1 oz) M Vag-Spont None N KOFI   6 Term 14 38w0d   M Vag-Spont   KOFI      Complications: Cervical cerclage suture present   5 Term 13 38w0d   M Vag-Spont   KOFI      Complications: Cervical cerclage suture present   4 SAB 12 20w0d   F SAB   ND   3  10/31/09 36w0d   F Vag-Spont   KOFI      Complications: Cervical cerclage suture present   2  05 36w0d   F Vag-Spont   KOFI      Complications: Cervical cerclage suture present   1 SAB 00 20w0d   M    ND          Review of Systems  Review of Systems   All other systems reviewed and are negative.          Objective:    Physical Exam   Constitutional: She is oriented to person, place, and time. She appears well-developed and well-nourished.   HENT:   Head: Normocephalic.   Neck: Normal range of motion.   Pulmonary/Chest: Effort normal.   Abdominal: Soft. Bowel sounds are normal.   Genitourinary: Vagina normal and uterus normal.   Musculoskeletal: Normal range of motion.   Neurological: She is alert and oriented to person, place, and time.   Skin: Skin is warm and dry.   Psychiatric: She has a normal mood and affect. Her behavior is normal.         Assessment:     1. Pregnancy examination or test, positive result    2. History of cerclage, currently pregnant    3. Uterine size date discrepancy,  first trimester              Plan:   Ebony was seen today for possible pregnancy.    Diagnoses and all orders for this visit:    Pregnancy examination or test, positive result  -     Rubella antibody, IgG; Future  -     HIV-1 and HIV-2 antibodies; Future  -     RPR; Future  -     C. trachomatis/N. gonorrhoeae by AMP DNA Vagina  -     Hepatitis B surface antigen; Future  -     Type & Screen - Ob Profile; Future  -     CBC auto differential; Future  -     US OB/GYN Procedure (Viewpoint); Future  -     Urine culture    History of cerclage, currently pregnant  -     Rubella antibody, IgG; Future  -     HIV-1 and HIV-2 antibodies; Future  -     RPR; Future  -     C. trachomatis/N. gonorrhoeae by AMP DNA Vagina  -     Hepatitis B surface antigen; Future  -     Type & Screen - Ob Profile; Future  -     CBC auto differential; Future  -     US OB/GYN Procedure (Viewpoint); Future  -     Urine culture    Uterine size date discrepancy, first trimester  -     Rubella antibody, IgG; Future  -     HIV-1 and HIV-2 antibodies; Future  -     RPR; Future  -     C. trachomatis/N. gonorrhoeae by AMP DNA Vagina  -     Hepatitis B surface antigen; Future  -     Type & Screen - Ob Profile; Future  -     CBC auto differential; Future  -     US OB/GYN Procedure (Viewpoint); Future  -     Urine culture    labs today. US and NOB in 4 wks. On PNV daily, no bleeding or cramping, nausea or vomiting. Exp very early gestation. Hx of need for cerclage will address once further along. al

## 2018-05-03 LAB
C TRACH DNA SPEC QL NAA+PROBE: NOT DETECTED
HBV SURFACE AG SERPL QL IA: NEGATIVE
HIV 1+2 AB+HIV1 P24 AG SERPL QL IA: NEGATIVE
N GONORRHOEA DNA SPEC QL NAA+PROBE: NOT DETECTED
RPR SER QL: NORMAL
RUBV IGG SER-ACNC: 11.5 IU/ML
RUBV IGG SER-IMP: REACTIVE

## 2018-05-04 LAB — BACTERIA UR CULT: NORMAL

## 2018-05-06 ENCOUNTER — TELEPHONE (OUTPATIENT)
Dept: OBSTETRICS AND GYNECOLOGY | Facility: HOSPITAL | Age: 33
End: 2018-05-06

## 2018-05-06 RX ORDER — CEPHALEXIN 500 MG/1
500 CAPSULE ORAL EVERY 6 HOURS
Qty: 28 CAPSULE | Refills: 0 | Status: SHIPPED | OUTPATIENT
Start: 2018-05-06 | End: 2018-05-29 | Stop reason: ALTCHOICE

## 2018-05-29 ENCOUNTER — INITIAL PRENATAL (OUTPATIENT)
Dept: OBSTETRICS AND GYNECOLOGY | Facility: CLINIC | Age: 33
End: 2018-05-29
Payer: MEDICAID

## 2018-05-29 ENCOUNTER — PROCEDURE VISIT (OUTPATIENT)
Dept: OBSTETRICS AND GYNECOLOGY | Facility: CLINIC | Age: 33
End: 2018-05-29
Payer: MEDICAID

## 2018-05-29 VITALS
WEIGHT: 128.75 LBS | BODY MASS INDEX: 25.14 KG/M2 | SYSTOLIC BLOOD PRESSURE: 108 MMHG | DIASTOLIC BLOOD PRESSURE: 60 MMHG

## 2018-05-29 DIAGNOSIS — O09.299 HISTORY OF CERCLAGE, CURRENTLY PREGNANT: ICD-10-CM

## 2018-05-29 DIAGNOSIS — Z98.890 HISTORY OF CERCLAGE, CURRENTLY PREGNANT: Primary | ICD-10-CM

## 2018-05-29 DIAGNOSIS — O26.841 UTERINE SIZE DATE DISCREPANCY, FIRST TRIMESTER: ICD-10-CM

## 2018-05-29 DIAGNOSIS — O09.299 HISTORY OF CERCLAGE, CURRENTLY PREGNANT: Primary | ICD-10-CM

## 2018-05-29 DIAGNOSIS — Z98.890 HISTORY OF CERCLAGE, CURRENTLY PREGNANT: ICD-10-CM

## 2018-05-29 DIAGNOSIS — Z32.01 PREGNANCY EXAMINATION OR TEST, POSITIVE RESULT: ICD-10-CM

## 2018-05-29 PROCEDURE — 99213 OFFICE O/P EST LOW 20 MIN: CPT | Mod: PBBFAC,TH,PN,25 | Performed by: ADVANCED PRACTICE MIDWIFE

## 2018-05-29 PROCEDURE — 76801 OB US < 14 WKS SINGLE FETUS: CPT | Mod: PBBFAC,PN | Performed by: OBSTETRICS & GYNECOLOGY

## 2018-05-29 PROCEDURE — 76801 OB US < 14 WKS SINGLE FETUS: CPT | Mod: 26,S$PBB,, | Performed by: OBSTETRICS & GYNECOLOGY

## 2018-05-29 PROCEDURE — 99204 OFFICE O/P NEW MOD 45 MIN: CPT | Mod: TH,25,S$PBB, | Performed by: ADVANCED PRACTICE MIDWIFE

## 2018-05-29 PROCEDURE — 99999 PR PBB SHADOW E&M-EST. PATIENT-LVL III: CPT | Mod: PBBFAC,,, | Performed by: ADVANCED PRACTICE MIDWIFE

## 2018-05-29 NOTE — PROGRESS NOTES
Pt here for new ob visit  Oriented to the practice including CNM/MD collaboration  Reviewed labs  Introduced to [employer] tamara  Blue bag materials reviewed  Warning signs discussed.  Pt voices no complaints.  To RTC 3 weeks at 10 weeks gestation with MD to discuss / schedule cerclage.  JUMANA

## 2018-06-13 ENCOUNTER — TELEPHONE (OUTPATIENT)
Dept: OBSTETRICS AND GYNECOLOGY | Facility: CLINIC | Age: 33
End: 2018-06-13

## 2018-06-13 DIAGNOSIS — N88.3 INCOMPETENCE OF CERVIX: Primary | ICD-10-CM

## 2018-06-18 ENCOUNTER — ROUTINE PRENATAL (OUTPATIENT)
Dept: OBSTETRICS AND GYNECOLOGY | Facility: CLINIC | Age: 33
End: 2018-06-18
Payer: MEDICAID

## 2018-06-18 VITALS
DIASTOLIC BLOOD PRESSURE: 62 MMHG | BODY MASS INDEX: 24.82 KG/M2 | SYSTOLIC BLOOD PRESSURE: 106 MMHG | WEIGHT: 127.13 LBS

## 2018-06-18 DIAGNOSIS — Z98.890 HISTORY OF CERCLAGE, CURRENTLY PREGNANT: ICD-10-CM

## 2018-06-18 DIAGNOSIS — N88.3 INCOMPETENCE OF CERVIX: Primary | ICD-10-CM

## 2018-06-18 DIAGNOSIS — O09.299 HISTORY OF CERCLAGE, CURRENTLY PREGNANT: ICD-10-CM

## 2018-06-18 DIAGNOSIS — Z30.09 ENCOUNTER FOR OTHER GENERAL COUNSELING OR ADVICE ON CONTRACEPTION: ICD-10-CM

## 2018-06-18 PROBLEM — Z30.9 CONTRACEPTIVE MANAGEMENT: Status: ACTIVE | Noted: 2017-06-30

## 2018-06-18 PROCEDURE — 99999 PR PBB SHADOW E&M-EST. PATIENT-LVL II: CPT | Mod: PBBFAC,,, | Performed by: OBSTETRICS & GYNECOLOGY

## 2018-06-18 PROCEDURE — 99212 OFFICE O/P EST SF 10 MIN: CPT | Mod: PBBFAC,TH,PN | Performed by: OBSTETRICS & GYNECOLOGY

## 2018-06-18 PROCEDURE — 99213 OFFICE O/P EST LOW 20 MIN: CPT | Mod: TH,S$PBB,, | Performed by: OBSTETRICS & GYNECOLOGY

## 2018-06-18 RX ORDER — HYDROXYPROGESTERONE CAPROATE 250 MG/ML
250 INJECTION INTRAMUSCULAR
Status: DISCONTINUED | OUTPATIENT
Start: 2018-07-16 | End: 2019-07-12

## 2018-06-18 RX ORDER — HYDROXYPROGESTERONE CAPROATE 250 MG/ML
250 INJECTION INTRAMUSCULAR
Qty: 5 ML | Refills: 4 | Status: ON HOLD | OUTPATIENT
Start: 2018-06-18 | End: 2018-11-28 | Stop reason: SDUPTHER

## 2018-06-18 NOTE — PROGRESS NOTES
Reviewed cerclage procedure in detail  Reviewed risks including but not limited to infection, bleeding, damage to bowel/bladder, srom, risk of miscarriage  Consent signed/witnessed  Pt aware of cerclage procedure date and time  sono for cervical length to be scheduled prior to 7/11/18  josh ordered

## 2018-06-19 ENCOUNTER — TELEPHONE (OUTPATIENT)
Dept: PHARMACY | Facility: CLINIC | Age: 33
End: 2018-06-19

## 2018-06-26 NOTE — TELEPHONE ENCOUNTER
Heartwell initial consultation declined; patient stating to have been on Heartwell for a previous pregnancy.  Pt notified Heartwell is covered with $0.00 copay and will be delivered in 4 week increments to [facility]. Explained that refill advocate will call to schedule future refills to be delivered to [facility] when pt has approx 1 injection remaining.  Medlist and allergies reviewed - No DDIs.  Pt verbalized understanding.

## 2018-06-26 NOTE — TELEPHONE ENCOUNTER
Neisha initial consultation and shipment attempted.  No answer.  [facility] for call back.  $0 copay in 004.

## 2018-06-27 ENCOUNTER — TELEPHONE (OUTPATIENT)
Dept: OBSTETRICS AND GYNECOLOGY | Facility: CLINIC | Age: 33
End: 2018-06-27

## 2018-06-27 NOTE — TELEPHONE ENCOUNTER
Please send the device tot [facility] location:    [facility] (OB/GYN)  Dr. Lema  [address] [place] [address], [place] [address]    Thank you, diana shafer Dr. and Staff,     MsPrabhjot Michael is okay with [place] copay $0.  What address would you like the Neisha sent to and when is her first injection?     Thanks,   Rhona Choi, PharmD, [facility]   Specialty Clinical Pharmacist   [facility]   [phone]

## 2018-06-29 ENCOUNTER — DOCUMENTATION ONLY (OUTPATIENT)
Dept: OBSTETRICS AND GYNECOLOGY | Facility: CLINIC | Age: 33
End: 2018-06-29

## 2018-07-05 ENCOUNTER — HOSPITAL ENCOUNTER (OUTPATIENT)
Dept: PREADMISSION TESTING | Facility: HOSPITAL | Age: 33
Discharge: HOME | End: 2018-07-05
Attending: OBSTETRICS & GYNECOLOGY
Payer: MEDICAID

## 2018-07-05 VITALS — WEIGHT: 127 LBS | HEIGHT: 60 IN | BODY MASS INDEX: 24.94 KG/M2

## 2018-07-05 NOTE — PLAN OF CARE
Patient visit for pre op instructions.  Pre operative instruction booklet given   showering instructions

## 2018-07-05 NOTE — DISCHARGE INSTRUCTIONS
To confirm, Your doctor has instructed you that surgery is scheduled for 7/11/18 at  09:00 am.       Please report to [facility], [facility], 1st floor, main lobby by 07:30 am.    Pre admit office will call afternoon prior to surgery with final arrival time    INSTRUCTIONS IMPORTANT!!!   Do not eat, drink, or smoke after 12 midnight. May have water and clear liquid juice until 3 hrs prior to surgery   OK to brush teeth, no gum, candy or mints!    ¨ Take only these medicines with a small swallow of water-morning of surgery.  None    Pre operative instructions:  Please review the Pre-Operative Instruction booklet that you were given.        Bathing Instructions--See page 6 in the Pre-operative booklet.      Prevention of surgical site infections:     -Keep incisions clean and dry.   -Do not soak/submerge incisions in water until completely healed.   -Do not apply lotions, powders, creams, or deodorants to site.   -Always make sure hands are cleaned with antibacterial soap/ alcohol-based                 prior to touching the surgical site.  (This includes doctors,                 nurses, staff, and yourself.)    Signs and symptoms:   -Redness and pain around the area where you had surgery   -Drainage of cloudy fluid from your surgical wound   -Fever over 100.4       I have read or had read and explained to me, and understand the above information.  Additional comments or instructions:  Received a copy of Pre-operative instructions booklet, FAQ surgical site infection sheet, and packets of hibiclens (if indicated).

## 2018-07-09 ENCOUNTER — ANESTHESIA EVENT (OUTPATIENT)
Dept: SURGERY | Facility: HOSPITAL | Age: 33
End: 2018-07-09
Payer: MEDICAID

## 2018-07-10 ENCOUNTER — PROCEDURE VISIT (OUTPATIENT)
Dept: OBSTETRICS AND GYNECOLOGY | Facility: CLINIC | Age: 33
End: 2018-07-10
Payer: MEDICAID

## 2018-07-10 DIAGNOSIS — Z30.9 CONTRACEPTIVE MANAGEMENT: ICD-10-CM

## 2018-07-10 DIAGNOSIS — O09.299 HISTORY OF CERCLAGE, CURRENTLY PREGNANT: ICD-10-CM

## 2018-07-10 DIAGNOSIS — N88.3 INCOMPETENCE OF CERVIX: Primary | ICD-10-CM

## 2018-07-10 DIAGNOSIS — Z98.890 HISTORY OF CERCLAGE, CURRENTLY PREGNANT: ICD-10-CM

## 2018-07-10 PROCEDURE — 76817 TRANSVAGINAL US OBSTETRIC: CPT | Mod: PBBFAC,PN | Performed by: OBSTETRICS & GYNECOLOGY

## 2018-07-10 PROCEDURE — 76817 TRANSVAGINAL US OBSTETRIC: CPT | Mod: 26,S$PBB,, | Performed by: OBSTETRICS & GYNECOLOGY

## 2018-07-11 ENCOUNTER — HOSPITAL ENCOUNTER (OUTPATIENT)
Facility: HOSPITAL | Age: 33
Discharge: HOME | End: 2018-07-11
Attending: OBSTETRICS & GYNECOLOGY | Admitting: OBSTETRICS & GYNECOLOGY
Payer: MEDICAID

## 2018-07-11 ENCOUNTER — SURGERY (OUTPATIENT)
Age: 33
End: 2018-07-11

## 2018-07-11 ENCOUNTER — ANESTHESIA (OUTPATIENT)
Dept: SURGERY | Facility: HOSPITAL | Age: 33
End: 2018-07-11
Payer: MEDICAID

## 2018-07-11 DIAGNOSIS — O34.32 SHORT CERVIX WITH CERVICAL CERCLAGE, ANTEPARTUM, SECOND TRIMESTER: Primary | ICD-10-CM

## 2018-07-11 DIAGNOSIS — O34.32 INCOMPETENT CERVIX DURING SECOND TRIMESTER, ANTEPARTUM: ICD-10-CM

## 2018-07-11 DIAGNOSIS — O26.872 SHORT CERVIX WITH CERVICAL CERCLAGE, ANTEPARTUM, SECOND TRIMESTER: Primary | ICD-10-CM

## 2018-07-11 PROCEDURE — 36000706: Performed by: OBSTETRICS & GYNECOLOGY

## 2018-07-11 PROCEDURE — 36000707: Performed by: OBSTETRICS & GYNECOLOGY

## 2018-07-11 PROCEDURE — 71000015 HC POSTOP RECOV 1ST HR: Performed by: OBSTETRICS & GYNECOLOGY

## 2018-07-11 PROCEDURE — 00948 ANES VAG PX CRV CERCLAGE: CPT | Performed by: OBSTETRICS & GYNECOLOGY

## 2018-07-11 PROCEDURE — 71000033 HC RECOVERY, INTIAL HOUR: Performed by: OBSTETRICS & GYNECOLOGY

## 2018-07-11 PROCEDURE — 37000008 HC ANESTHESIA 1ST 15 MINUTES: Performed by: OBSTETRICS & GYNECOLOGY

## 2018-07-11 PROCEDURE — 37000009 HC ANESTHESIA EA ADD 15 MINS: Performed by: OBSTETRICS & GYNECOLOGY

## 2018-07-11 PROCEDURE — 25000003 PHARM REV CODE 250: Performed by: CLINIC/CENTER

## 2018-07-11 PROCEDURE — 59320 REVISION OF CERVIX: CPT | Mod: ,,, | Performed by: OBSTETRICS & GYNECOLOGY

## 2018-07-11 PROCEDURE — 63600175 PHARM REV CODE 636 W HCPCS: Performed by: CLINIC/CENTER

## 2018-07-11 PROCEDURE — 63600175 PHARM REV CODE 636 W HCPCS: Performed by: OBSTETRICS & GYNECOLOGY

## 2018-07-11 RX ORDER — SODIUM CHLORIDE 0.9 % (FLUSH) 0.9 %
3 SYRINGE (ML) INJECTION
Status: DISCONTINUED | OUTPATIENT
Start: 2018-07-11 | End: 2018-07-11 | Stop reason: HOSPADM

## 2018-07-11 RX ORDER — OXYCODONE AND ACETAMINOPHEN 5; 325 MG/1; MG/1
1 TABLET ORAL
Status: DISCONTINUED | OUTPATIENT
Start: 2018-07-11 | End: 2018-07-11 | Stop reason: HOSPADM

## 2018-07-11 RX ORDER — FENTANYL CITRATE 50 UG/ML
INJECTION, SOLUTION INTRAMUSCULAR; INTRAVENOUS
Status: DISCONTINUED | OUTPATIENT
Start: 2018-07-11 | End: 2018-07-11

## 2018-07-11 RX ORDER — ONDANSETRON 8 MG/1
8 TABLET, ORALLY DISINTEGRATING ORAL EVERY 8 HOURS PRN
Status: DISCONTINUED | OUTPATIENT
Start: 2018-07-11 | End: 2018-07-11 | Stop reason: HOSPADM

## 2018-07-11 RX ORDER — PROPOFOL 10 MG/ML
VIAL (ML) INTRAVENOUS
Status: DISCONTINUED | OUTPATIENT
Start: 2018-07-11 | End: 2018-07-11

## 2018-07-11 RX ORDER — MORPHINE SULFATE 4 MG/ML
2 INJECTION, SOLUTION INTRAMUSCULAR; INTRAVENOUS EVERY 5 MIN PRN
Status: DISCONTINUED | OUTPATIENT
Start: 2018-07-11 | End: 2018-07-11 | Stop reason: HOSPADM

## 2018-07-11 RX ORDER — IBUPROFEN 800 MG/1
800 TABLET ORAL 3 TIMES DAILY
Status: CANCELLED | OUTPATIENT
Start: 2018-07-11

## 2018-07-11 RX ORDER — CEFAZOLIN SODIUM 2 G/50ML
2 SOLUTION INTRAVENOUS
Status: COMPLETED | OUTPATIENT
Start: 2018-07-11 | End: 2018-07-11

## 2018-07-11 RX ORDER — DIPHENHYDRAMINE HCL 25 MG
25 CAPSULE ORAL EVERY 4 HOURS PRN
Status: CANCELLED | OUTPATIENT
Start: 2018-07-11

## 2018-07-11 RX ORDER — HYDROCODONE BITARTRATE AND ACETAMINOPHEN 5; 325 MG/1; MG/1
1 TABLET ORAL EVERY 4 HOURS PRN
Status: CANCELLED | OUTPATIENT
Start: 2018-07-11

## 2018-07-11 RX ORDER — LIDOCAINE HYDROCHLORIDE 10 MG/ML
INJECTION INFILTRATION; PERINEURAL
Status: DISCONTINUED | OUTPATIENT
Start: 2018-07-11 | End: 2018-07-11

## 2018-07-11 RX ORDER — IBUPROFEN 800 MG/1
800 TABLET ORAL EVERY 8 HOURS PRN
Qty: 2 TABLET | Refills: 0 | Status: SHIPPED | OUTPATIENT
Start: 2018-07-11 | End: 2018-07-18

## 2018-07-11 RX ORDER — ROCURONIUM BROMIDE 10 MG/ML
INJECTION, SOLUTION INTRAVENOUS
Status: DISCONTINUED | OUTPATIENT
Start: 2018-07-11 | End: 2018-07-11

## 2018-07-11 RX ORDER — SODIUM CHLORIDE, SODIUM LACTATE, POTASSIUM CHLORIDE, CALCIUM CHLORIDE 600; 310; 30; 20 MG/100ML; MG/100ML; MG/100ML; MG/100ML
INJECTION, SOLUTION INTRAVENOUS CONTINUOUS PRN
Status: DISCONTINUED | OUTPATIENT
Start: 2018-07-11 | End: 2018-07-11

## 2018-07-11 RX ORDER — DIPHENHYDRAMINE HYDROCHLORIDE 50 MG/ML
25 INJECTION INTRAMUSCULAR; INTRAVENOUS EVERY 4 HOURS PRN
Status: CANCELLED | OUTPATIENT
Start: 2018-07-11

## 2018-07-11 RX ORDER — ONDANSETRON 2 MG/ML
4 INJECTION INTRAMUSCULAR; INTRAVENOUS ONCE AS NEEDED
Status: DISCONTINUED | OUTPATIENT
Start: 2018-07-11 | End: 2018-07-11 | Stop reason: HOSPADM

## 2018-07-11 RX ORDER — HYDROMORPHONE HYDROCHLORIDE 1 MG/ML
0.2 INJECTION, SOLUTION INTRAMUSCULAR; INTRAVENOUS; SUBCUTANEOUS EVERY 5 MIN PRN
Status: DISCONTINUED | OUTPATIENT
Start: 2018-07-11 | End: 2018-07-11 | Stop reason: HOSPADM

## 2018-07-11 RX ORDER — MEPERIDINE HYDROCHLORIDE 50 MG/ML
12.5 INJECTION INTRAMUSCULAR; INTRAVENOUS; SUBCUTANEOUS ONCE AS NEEDED
Status: DISCONTINUED | OUTPATIENT
Start: 2018-07-11 | End: 2018-07-11 | Stop reason: HOSPADM

## 2018-07-11 RX ORDER — SUCCINYLCHOLINE CHLORIDE 20 MG/ML
INJECTION INTRAMUSCULAR; INTRAVENOUS
Status: DISCONTINUED | OUTPATIENT
Start: 2018-07-11 | End: 2018-07-11

## 2018-07-11 RX ADMIN — LIDOCAINE HYDROCHLORIDE 50 MG: 10 INJECTION, SOLUTION INFILTRATION; PERINEURAL at 09:07

## 2018-07-11 RX ADMIN — PROPOFOL 120 MG: 10 INJECTION, EMULSION INTRAVENOUS at 09:07

## 2018-07-11 RX ADMIN — FENTANYL CITRATE 50 MCG: 50 INJECTION, SOLUTION INTRAMUSCULAR; INTRAVENOUS at 09:07

## 2018-07-11 RX ADMIN — CEFAZOLIN SODIUM 2 G: 2 SOLUTION INTRAVENOUS at 09:07

## 2018-07-11 RX ADMIN — ROCURONIUM BROMIDE 5 MG: 10 INJECTION, SOLUTION INTRAVENOUS at 09:07

## 2018-07-11 RX ADMIN — SODIUM CHLORIDE, SODIUM LACTATE, POTASSIUM CHLORIDE, AND CALCIUM CHLORIDE: 600; 310; 30; 20 INJECTION, SOLUTION INTRAVENOUS at 09:07

## 2018-07-11 RX ADMIN — SUCCINYLCHOLINE CHLORIDE 80 MG: 20 INJECTION, SOLUTION INTRAMUSCULAR; INTRAVENOUS at 09:07

## 2018-07-11 NOTE — TRANSFER OF CARE
Anesthesia Transfer of Care Note    Patient: Ebony Rodriguez    Procedure(s) Performed: Procedure(s) (LRB):  CERCLAGE, CERVIX (N/A)    Patient location: PACU    Anesthesia Type: general    Transport from OR: Transported from OR on room air with adequate spontaneous ventilation    Post pain: adequate analgesia    Post assessment: no apparent anesthetic complications and tolerated procedure well    Post vital signs: stable    Level of consciousness: sedated    Nausea/Vomiting: no nausea/vomiting    Complications: none    Transfer of care protocol was followed      Last vitals:   Visit Vitals  /69 (BP Location: Right arm, Patient Position: Sitting)   Pulse 83   Temp 36.9 °C (98.4 °F) (Temporal)   Resp 16   Ht 5' (1.524 m)   Wt 56.9 kg (125 lb 7.1 oz)   LMP 04/07/2018   SpO2 100%   Breastfeeding? No   BMI 24.50 kg/m²

## 2018-07-11 NOTE — DISCHARGE INSTRUCTIONS
General Information:    1. Do not drink alcoholic beverages including beer for 24 hours or as long as you are on pain medication..  2. Do not drive a motor vehicle, operate machinery or power tools, or signs legal papers for 24 hours or as long as you are on pain medication.   3. You may experience light-headedness, dizziness, and sleepiness following surgery. Please do not stay alone. A responsible adult should be with you for this 24 hour period.  4. Go home and rest.  5. Progress slowly to a normal diet unless instructed.  Otherwise, begin with liquids such as soft drinks, then soup and crackers working up to solid foods. Drink plenty of nonalcoholic fluids.  6. Certain anesthetics and pain medications produce nausea and vomiting in certain individuals. If nausea becomes a problem at home, call you doctor.  7. A nurse will be calling you sometime after surgery. Do not be alarmed. This is our way of finding out how you are doing.  8. Several times every hour while you are awake, take 2-3 deep breaths and cough. If you had stomach surgery hold a pillow or rolled towel firmly against your stomach before you cough. This will help with any pain the cough might cause.  9. Several times every hour while you are awake, pump and flex your feet 5-6 times and do foot circles. This will help prevent blood clots.  10. Call your doctor for severe pain, bleeding, fever, or signs or symptoms of infection (pain, swelling, redness, foul odor, drainage).  11. You can contact your doctor anytime by [phone] for the [facility] (at [facility]) or [phone] for the [facility] on [facility].   [email or web address] is another way to contact your doctor if you are an active participant online with [facility].

## 2018-07-11 NOTE — PLAN OF CARE
Patient prepared for surgery. Spouse is in waiting room with 3 small children; pt states he will stay there. Belongings bag will be locked in pre-op lockers prior to surgery per patient's request.

## 2018-07-11 NOTE — ANESTHESIA POSTPROCEDURE EVALUATION
Anesthesia Post Evaluation    Patient: Ebony Rodriguez    Procedure(s) Performed: Procedure(s) (LRB):  CERCLAGE, CERVIX (N/A)    Final Anesthesia Type: general  Patient location during evaluation: PACU  Patient participation: Yes- Able to Participate  Level of consciousness: awake and alert and oriented  Post-procedure vital signs: reviewed and stable  Pain management: adequate  Airway patency: patent  PONV status at discharge: No PONV  Anesthetic complications: no      Cardiovascular status: blood pressure returned to baseline and hemodynamically stable  Respiratory status: unassisted, spontaneous ventilation and room air  Hydration status: euvolemic  Follow-up not needed.        Visit Vitals  /75   Pulse 64   Temp 36.8 °C (98.2 °F) (Oral)   Resp 16   Ht 5' (1.524 m)   Wt 56.9 kg (125 lb 7.1 oz)   LMP 04/07/2018   SpO2 100%   Breastfeeding? No   BMI 24.50 kg/m²       Pain/Simon Score: Pain Assessment Performed: Yes (7/11/2018 10:12 AM)  Presence of Pain: denies (7/11/2018 10:12 AM)  Simon Score: 10 (7/11/2018 10:12 AM)

## 2018-07-11 NOTE — INTERVAL H&P NOTE
The patient has been examined and the H&P has been reviewed:    I concur with the findings and no changes have occurred since H&P was written.  She is ready to proceed with cervical cerclage.  Her cervical length yesterday on sono was 2.1 cm.  She has a post op appt scheduled for 7/18/18.    Anesthesia/Surgery risks, benefits and alternative options discussed and understood by patient/family.          Active Hospital Problems    Diagnosis  POA    Incompetent cervix during second trimester, antepartum [O34.32]  Yes      Resolved Hospital Problems    Diagnosis Date Resolved POA   No resolved problems to display.

## 2018-07-11 NOTE — OP NOTE
[facility] -   General Surgery  Operative Note    SUMMARY     Date of Procedure: 7/11/2018     Procedure: Procedure(s) (LRB):  CERCLAGE, CERVIX (N/A)       Surgeon(s) and Role:     * Keila Lema MD - Primary    Assisting Surgeon: None    Pre-Operative Diagnosis: Incompetence of cervix [N88.3]    Post-Operative Diagnosis: Post-Op Diagnosis Codes:     * Incompetence of cervix [N88.3]    Anesthesia: General    Technical Procedures Used: croft's cerclage    The patient was taken to the operating room where general anesthesia was placed and found to be adequate.  She was placed in the dorsal supine position with her legs in the Robles stirrups.  Her perineum was then prepped and draped in the normal sterile fashion, and her bladder was drained in an in and out fashion.  Time out was performed.    A weighted sterile speculum was placed in the vagina and the findings stated above were noted.  The anterior lip of the cervix was grasped with a Ring Forcep.  The bladder was retracted back with a right angle retractor.  A Croft cerclage was then performed using mersilene tape.  The knot was tied at 12 o'clock.  Examination of the cervix afterwards revealed a closed cervix.  Excellent hemostasis was noted.  All instruments were removed from the vagina.    The patient tolerated the procedure well.  Sponge, laparotomy towels, and needle counts were correct x 2.  She will go to PACU in stable condition.    Description of the Findings of the Procedure: cervix closed, approx 2 cm long    Significant Surgical Tasks Conducted by the Assistant(s), if Applicable: n/a    Complications: No    Estimated Blood Loss (EBL): * No values recorded between 7/11/2018  9:29 AM and 7/11/2018  9:49 AM *           Implants:   Implant Name Type Inv. Item Serial No.  Lot No. LRB No. Used   Mersiline 5mm suture          [id number] N/A 1       Specimens:   Specimen (12h ago through future)    None                  Condition:  Good    Disposition: PACU - hemodynamically stable.    Attestation: I performed the procedure.

## 2018-07-11 NOTE — BRIEF OP NOTE
[facility] -   Brief Operative Note     SUMMARY     Surgery Date: 7/11/2018     Surgeon(s) and Role:     * Keila Escobedo MD - Primary    Assisting Surgeon: None    Pre-op Diagnosis:  Incompetence of cervix [N88.3]    Post-op Diagnosis:  Post-Op Diagnosis Codes:     * Incompetence of cervix [N88.3]    Procedure(s) (LRB):  CERCLAGE, CERVIX (N/A)    Anesthesia: General    Description of the findings of the procedure: see op note for details    Findings/Key Components: cervix, closed, approx 2 cm long    Estimated Blood Loss: * No values recorded between 7/11/2018  9:29 AM and 7/11/2018  9:50 AM *         Specimens:   Specimen (12h ago through future)    None          Discharge Note    SUMMARY     Admit Date: 7/11/2018    Discharge Date and Time:  07/11/2018 9:51 AM    Hospital Course (synopsis of major diagnoses, care, treatment, and services provided during the course of the hospital stay): Pt underwent bazan cerclage placement at 13+wks; for history of incompetent cervix.  She tolerated the procedure well and was stable for discharge from the pacu.       Final Diagnosis: Post-Op Diagnosis Codes:     * Incompetence of cervix [N88.3]    Disposition: Home or Self Care    Follow Up/Patient Instructions:   Pelvic rest; no lifting over 15lbs; keep scheduled appt with dr escobedo  Medications:  Reconciled Home Medications:      Medication List      START taking these medications    ibuprofen 800 MG tablet  Commonly known as:  ADVIL,MOTRIN  Take 1 tablet (800 mg total) by mouth every 8 (eight) hours as needed for Pain.        CONTINUE taking these medications    PRENATAL 1+1 ORAL  Take by mouth.        STOP taking these medications    JUNIE 250 mg/mL (1 mL) Oil  Generic drug:  hydroxyprogest(PF)(preg presv)            Discharge Procedure Orders  Diet general     Other restrictions (specify):   Order Comments: Pelvic rest until after post op appt;     Call MD for:  temperature >100.4     Call MD for:   persistent nausea and vomiting     Call MD for:  severe uncontrolled pain     No dressing needed       Follow-up Information     Keila Lema MD.    Specialty:  Obstetrics and Gynecology  Why:  has appt scheduled  Contact information:  [address] [facility] [address] [phone]

## 2018-07-11 NOTE — ANESTHESIA PREPROCEDURE EVALUATION
07/11/2018  Ebony Rodriguez is a 33 y.o., female.    Anesthesia Evaluation    I have reviewed the Patient Summary Reports.    I have reviewed the Nursing Notes.      Review of Systems  Anesthesia Hx:  No problems with previous Anesthesia    Social:  Non-Smoker    Hematology/Oncology:     Oncology Normal    -- Anemia:   EENT/Dental:EENT/Dental Normal   Cardiovascular:  Cardiovascular Normal     Pulmonary:  Pulmonary Normal    Renal/:  Renal/ Normal     Hepatic/GI:  Hepatic/GI Normal    Musculoskeletal:  Musculoskeletal Normal    OB/GYN/PEDS:  Incompetent cervix  History of previous cerclage  History of miscarriage   Neurological:  Neurology Normal    Endocrine:  Endocrine Normal    Dermatological:  Skin Normal    Psych:  Psychiatric Normal           Physical Exam  General:  Well nourished    Airway/Jaw/Neck:  Airway Findings: Mouth Opening: Normal Tongue: Normal  General Airway Assessment: Adult  Mallampati: II  Improves to I with phonation.  TM Distance: Normal, at least 6 cm     Eyes/Ears/Nose:  Eyes/Ears/Nose Findings:    Dental:  Dental Findings: In tact   Chest/Lungs:  Chest/Lungs Findings: Clear to auscultation, Normal Respiratory Rate     Heart/Vascular:  Heart Findings: Rate: Normal  Rhythm: Regular Rhythm        Mental Status:  Mental Status Findings:  Cooperative, Alert and Oriented         Anesthesia Plan  Type of Anesthesia, risks & benefits discussed:  Anesthesia Type:  general  Patient's Preference:   Intra-op Monitoring Plan: standard ASA monitors  Intra-op Monitoring Plan Comments:   Post Op Pain Control Plan: per primary service following discharge from PACU and IV/PO Opioids PRN  Post Op Pain Control Plan Comments:   Induction:   IV  Beta Blocker:  Patient is not currently on a Beta-Blocker (No further documentation required).       Informed Consent: Patient understands risks and  agrees with Anesthesia plan.  Questions answered. Anesthesia consent signed with patient.  ASA Score: 1     Day of Surgery Review of History & Physical:    H&P update referred to the surgeon.     Anesthesia Plan Notes: Labs (5/2/18) Hgb 12.5, Plt 180        Ready For Surgery From Anesthesia Perspective.

## 2018-07-17 VITALS
HEART RATE: 64 BPM | WEIGHT: 125.44 LBS | DIASTOLIC BLOOD PRESSURE: 75 MMHG | TEMPERATURE: 98 F | SYSTOLIC BLOOD PRESSURE: 117 MMHG | RESPIRATION RATE: 16 BRPM | BODY MASS INDEX: 24.63 KG/M2 | HEIGHT: 60 IN | OXYGEN SATURATION: 100 %

## 2018-07-18 ENCOUNTER — ROUTINE PRENATAL (OUTPATIENT)
Dept: OBSTETRICS AND GYNECOLOGY | Facility: CLINIC | Age: 33
End: 2018-07-18
Payer: MEDICAID

## 2018-07-18 VITALS
WEIGHT: 125.69 LBS | BODY MASS INDEX: 24.54 KG/M2 | DIASTOLIC BLOOD PRESSURE: 76 MMHG | SYSTOLIC BLOOD PRESSURE: 102 MMHG

## 2018-07-18 DIAGNOSIS — N89.8 VAGINAL DISCHARGE: ICD-10-CM

## 2018-07-18 DIAGNOSIS — N88.3 INCOMPETENCE OF CERVIX: ICD-10-CM

## 2018-07-18 DIAGNOSIS — O09.299 HISTORY OF CERCLAGE, CURRENTLY PREGNANT: Primary | ICD-10-CM

## 2018-07-18 DIAGNOSIS — Z98.890 HISTORY OF CERCLAGE, CURRENTLY PREGNANT: Primary | ICD-10-CM

## 2018-07-18 PROCEDURE — 87660 TRICHOMONAS VAGIN DIR PROBE: CPT

## 2018-07-18 PROCEDURE — 99213 OFFICE O/P EST LOW 20 MIN: CPT | Mod: TH,S$PBB,, | Performed by: OBSTETRICS & GYNECOLOGY

## 2018-07-18 PROCEDURE — 99212 OFFICE O/P EST SF 10 MIN: CPT | Mod: PBBFAC,TH,PN | Performed by: OBSTETRICS & GYNECOLOGY

## 2018-07-18 PROCEDURE — 99999 PR PBB SHADOW E&M-EST. PATIENT-LVL II: CPT | Mod: PBBFAC,,, | Performed by: OBSTETRICS & GYNECOLOGY

## 2018-07-18 NOTE — PROGRESS NOTES
Reports no problems after cerclage placement  Had spotting for 2 days  josh weekly starting next wk  sono for stitch location--next wk  Offer quad screen next visit  Coffective counseling sheet Fall In Love discussed with mother. Reinforced immediate skin to skin, the magic first hour, importance of the first feeding and delaying routine procedures. Encouraged mother to download Coffective mobile tamara if she has not already done so. Mother verbalizes understanding.  Skin to skin handout given

## 2018-07-19 ENCOUNTER — TELEPHONE (OUTPATIENT)
Dept: OBSTETRICS AND GYNECOLOGY | Facility: CLINIC | Age: 33
End: 2018-07-19

## 2018-07-19 DIAGNOSIS — N76.0 BACTERIAL VAGINOSIS: Primary | ICD-10-CM

## 2018-07-19 DIAGNOSIS — B96.89 BACTERIAL VAGINOSIS: Primary | ICD-10-CM

## 2018-07-19 LAB
CANDIDA RRNA VAG QL PROBE: NEGATIVE
G VAGINALIS RRNA GENITAL QL PROBE: POSITIVE
T VAGINALIS RRNA GENITAL QL PROBE: NEGATIVE

## 2018-07-19 RX ORDER — CLINDAMYCIN HYDROCHLORIDE 300 MG/1
300 CAPSULE ORAL 2 TIMES DAILY
Qty: 14 CAPSULE | Refills: 0 | Status: SHIPPED | OUTPATIENT
Start: 2018-07-19 | End: 2018-07-26

## 2018-07-19 NOTE — TELEPHONE ENCOUNTER
----- Message from Keila Lema MD sent at 7/19/2018 12:58 PM CDT -----  Please advise the vaginal culture is positive for extra bacteria; rx sent

## 2018-07-19 NOTE — TELEPHONE ENCOUNTER
Spoke to pt,     Please advise the vaginal culture is positive for extra bacteria; rx sent    Pt verbalized understanding.    GEE Jarquin LPN

## 2018-08-01 ENCOUNTER — CLINICAL SUPPORT (OUTPATIENT)
Dept: OBSTETRICS AND GYNECOLOGY | Facility: CLINIC | Age: 33
End: 2018-08-01
Payer: MEDICAID

## 2018-08-01 ENCOUNTER — PROCEDURE VISIT (OUTPATIENT)
Dept: OBSTETRICS AND GYNECOLOGY | Facility: CLINIC | Age: 33
End: 2018-08-01
Payer: MEDICAID

## 2018-08-01 VITALS — WEIGHT: 125.44 LBS | DIASTOLIC BLOOD PRESSURE: 70 MMHG | SYSTOLIC BLOOD PRESSURE: 108 MMHG | BODY MASS INDEX: 24.5 KG/M2

## 2018-08-01 DIAGNOSIS — N88.3 INCOMPETENCE OF CERVIX: Primary | ICD-10-CM

## 2018-08-01 DIAGNOSIS — O09.299 HISTORY OF CERCLAGE, CURRENTLY PREGNANT: ICD-10-CM

## 2018-08-01 DIAGNOSIS — N88.3 INCOMPETENCE OF CERVIX: ICD-10-CM

## 2018-08-01 DIAGNOSIS — Z98.890 HISTORY OF CERCLAGE, CURRENTLY PREGNANT: ICD-10-CM

## 2018-08-01 PROCEDURE — 76817 TRANSVAGINAL US OBSTETRIC: CPT | Mod: 26,52,S$PBB, | Performed by: OBSTETRICS & GYNECOLOGY

## 2018-08-01 PROCEDURE — 99999 PR PBB SHADOW E&M-EST. PATIENT-LVL I: CPT | Mod: PBBFAC,,,

## 2018-08-01 PROCEDURE — 76817 TRANSVAGINAL US OBSTETRIC: CPT | Mod: 52,PBBFAC,PN | Performed by: OBSTETRICS & GYNECOLOGY

## 2018-08-01 PROCEDURE — 96372 THER/PROPH/DIAG INJ SC/IM: CPT | Mod: PBBFAC,PN

## 2018-08-01 PROCEDURE — 99211 OFF/OP EST MAY X REQ PHY/QHP: CPT | Mod: PBBFAC,PN,25

## 2018-08-01 RX ADMIN — HYDROXYPROGESTERONE CAPROATE 250 MG: 250 INJECTION INTRAMUSCULAR at 01:08

## 2018-08-01 NOTE — PROGRESS NOTES
Pt identified using 2 identifiers, name and . Pt agreed to [place] injection. Given in R dorsogluteal. Pt tolerated without complaints. Pt instructed to wait 15 min to monitor for S&S, verbalized understanding. No S&S noted at this time.     GEE Jarquin LPN

## 2018-08-08 ENCOUNTER — CLINICAL SUPPORT (OUTPATIENT)
Dept: OBSTETRICS AND GYNECOLOGY | Facility: CLINIC | Age: 33
End: 2018-08-08
Payer: MEDICAID

## 2018-08-08 VITALS
BODY MASS INDEX: 24.54 KG/M2 | DIASTOLIC BLOOD PRESSURE: 82 MMHG | SYSTOLIC BLOOD PRESSURE: 120 MMHG | WEIGHT: 125.69 LBS

## 2018-08-08 DIAGNOSIS — N88.3 INCOMPETENCE OF CERVIX: Primary | ICD-10-CM

## 2018-08-08 PROCEDURE — 99999 PR PBB SHADOW E&M-EST. PATIENT-LVL I: CPT | Mod: PBBFAC,,,

## 2018-08-08 PROCEDURE — 96372 THER/PROPH/DIAG INJ SC/IM: CPT | Mod: PBBFAC,PN

## 2018-08-08 PROCEDURE — 99211 OFF/OP EST MAY X REQ PHY/QHP: CPT | Mod: PBBFAC,PN

## 2018-08-08 RX ADMIN — HYDROXYPROGESTERONE CAPROATE 250 MG: 250 INJECTION INTRAMUSCULAR at 10:08

## 2018-08-08 NOTE — PROGRESS NOTES
Pt identified using 2 identifiers, name and . Pt agreed to [place] injection. Given in L dorsogluteal. Pt tolerated without complaints. Pt instructed to wait 15 min to monitor for S&S, verbalized understanding. No S&S noted at this time.     GEE Jarquin LPN

## 2018-08-14 ENCOUNTER — TELEPHONE (OUTPATIENT)
Dept: PHARMACY | Facility: CLINIC | Age: 33
End: 2018-08-14

## 2018-08-14 ENCOUNTER — ROUTINE PRENATAL (OUTPATIENT)
Dept: OBSTETRICS AND GYNECOLOGY | Facility: CLINIC | Age: 33
End: 2018-08-14
Payer: MEDICAID

## 2018-08-14 VITALS
BODY MASS INDEX: 24.76 KG/M2 | DIASTOLIC BLOOD PRESSURE: 72 MMHG | WEIGHT: 126.75 LBS | SYSTOLIC BLOOD PRESSURE: 112 MMHG

## 2018-08-14 DIAGNOSIS — N88.3 INCOMPETENCE OF CERVIX: Primary | ICD-10-CM

## 2018-08-14 DIAGNOSIS — Z98.890 HISTORY OF CERCLAGE, CURRENTLY PREGNANT: ICD-10-CM

## 2018-08-14 DIAGNOSIS — O09.299 HISTORY OF CERCLAGE, CURRENTLY PREGNANT: ICD-10-CM

## 2018-08-14 PROCEDURE — 99212 OFFICE O/P EST SF 10 MIN: CPT | Mod: PBBFAC,TH,PN | Performed by: ADVANCED PRACTICE MIDWIFE

## 2018-08-14 PROCEDURE — 99999 PR PBB SHADOW E&M-EST. PATIENT-LVL II: CPT | Mod: PBBFAC,,, | Performed by: ADVANCED PRACTICE MIDWIFE

## 2018-08-14 PROCEDURE — 99213 OFFICE O/P EST LOW 20 MIN: CPT | Mod: TH,S$PBB,, | Performed by: ADVANCED PRACTICE MIDWIFE

## 2018-08-14 RX ADMIN — HYDROXYPROGESTERONE CAPROATE 250 MG: 250 INJECTION INTRAMUSCULAR at 11:08

## 2018-08-14 NOTE — NURSING
Pt identified using 2 identifiers, name and . Pt agreed to [place] injection. Given in R dorsogluteal. Pt tolerated without complaints. Pt instructed to wait 15 min to monitor for S&S, verbalized understanding. No S&S noted at this time.     GEE Jarquin LPN

## 2018-08-14 NOTE — PROGRESS NOTES
Pt reports flutters.  Neisha today.  Refuses QUAD. Anatomy scan with RTC 2 weks.  Pt adamant to bottle feed.  Benefits of BF and skin to skin discussed. Reports no problems. VM

## 2018-08-22 ENCOUNTER — CLINICAL SUPPORT (OUTPATIENT)
Dept: OBSTETRICS AND GYNECOLOGY | Facility: CLINIC | Age: 33
End: 2018-08-22
Payer: MEDICAID

## 2018-08-22 DIAGNOSIS — N88.3 INCOMPETENCE OF CERVIX: Primary | ICD-10-CM

## 2018-08-22 PROCEDURE — 96372 THER/PROPH/DIAG INJ SC/IM: CPT | Mod: PBBFAC,PN

## 2018-08-22 RX ADMIN — HYDROXYPROGESTERONE CAPROATE 250 MG: 250 INJECTION INTRAMUSCULAR at 02:08

## 2018-08-22 NOTE — PROGRESS NOTES
Per [place] protocol, gave pt. Neisha injection to right ventro gluteal area. Pt. advised to remain in the waiting area for 15 minutes to ensure no adverse reaction. Pt. given future [place] dates. diana arrington

## 2018-08-29 ENCOUNTER — ROUTINE PRENATAL (OUTPATIENT)
Dept: OBSTETRICS AND GYNECOLOGY | Facility: CLINIC | Age: 33
End: 2018-08-29
Payer: MEDICAID

## 2018-08-29 ENCOUNTER — PROCEDURE VISIT (OUTPATIENT)
Dept: OBSTETRICS AND GYNECOLOGY | Facility: CLINIC | Age: 33
End: 2018-08-29
Payer: MEDICAID

## 2018-08-29 VITALS
BODY MASS INDEX: 25.02 KG/M2 | SYSTOLIC BLOOD PRESSURE: 108 MMHG | DIASTOLIC BLOOD PRESSURE: 68 MMHG | WEIGHT: 128.06 LBS

## 2018-08-29 DIAGNOSIS — O09.299 HISTORY OF CERCLAGE, CURRENTLY PREGNANT: ICD-10-CM

## 2018-08-29 DIAGNOSIS — Z36.3 ENCOUNTER FOR ANTENATAL SCREENING FOR MALFORMATION USING ULTRASOUND: ICD-10-CM

## 2018-08-29 DIAGNOSIS — Z98.890 HISTORY OF CERCLAGE, CURRENTLY PREGNANT: ICD-10-CM

## 2018-08-29 PROBLEM — O34.32 INCOMPETENT CERVIX DURING SECOND TRIMESTER, ANTEPARTUM: Status: RESOLVED | Noted: 2018-07-11 | Resolved: 2018-08-29

## 2018-08-29 PROCEDURE — 76805 OB US >/= 14 WKS SNGL FETUS: CPT | Mod: 26,S$PBB,, | Performed by: OBSTETRICS & GYNECOLOGY

## 2018-08-29 PROCEDURE — 76805 OB US >/= 14 WKS SNGL FETUS: CPT | Mod: PBBFAC,PN | Performed by: OBSTETRICS & GYNECOLOGY

## 2018-08-29 PROCEDURE — 96372 THER/PROPH/DIAG INJ SC/IM: CPT | Mod: PBBFAC,PN

## 2018-08-29 PROCEDURE — 99212 OFFICE O/P EST SF 10 MIN: CPT | Mod: PBBFAC,TH,PN,25 | Performed by: ADVANCED PRACTICE MIDWIFE

## 2018-08-29 PROCEDURE — 99999 PR PBB SHADOW E&M-EST. PATIENT-LVL II: CPT | Mod: PBBFAC,,, | Performed by: ADVANCED PRACTICE MIDWIFE

## 2018-08-29 PROCEDURE — 99213 OFFICE O/P EST LOW 20 MIN: CPT | Mod: TH,S$PBB,, | Performed by: ADVANCED PRACTICE MIDWIFE

## 2018-08-29 RX ADMIN — HYDROXYPROGESTERONE CAPROATE 250 MG: 250 INJECTION INTRAMUSCULAR at 11:08

## 2018-08-29 NOTE — NURSING
Pt identified using 2 identifiers, name and . Pt agreed to [place] injection. Given in L dorsogluteal. Pt tolerated without complaints. Pt instructed to wait 15 min to monitor for S&S, verbalized understanding. No S&S noted at this time.     GEE Jarquin LPN

## 2018-08-29 NOTE — PROGRESS NOTES
US reviewed, anatomy incomplete.  Repeat with RTC 4 weeks.  Reports no cramping or PTL S/S.  States will try to BF.  Encouraged to download coffective tamara. Coffective counseling sheet Learn Your Baby discussed with mother. Instructed regarding feeding cues and methods to calm baby. Encouraged mother to download Coffective mobile tamara if she has not already done so.  Mother verbalized understanding. Neisha today

## 2018-09-05 ENCOUNTER — CLINICAL SUPPORT (OUTPATIENT)
Dept: OBSTETRICS AND GYNECOLOGY | Facility: CLINIC | Age: 33
End: 2018-09-05
Payer: MEDICAID

## 2018-09-05 DIAGNOSIS — N88.3 INCOMPETENCE OF CERVIX: Primary | ICD-10-CM

## 2018-09-05 PROCEDURE — 96372 THER/PROPH/DIAG INJ SC/IM: CPT | Mod: PBBFAC,PN

## 2018-09-05 RX ADMIN — HYDROXYPROGESTERONE CAPROATE 250 MG: 250 INJECTION INTRAMUSCULAR at 11:09

## 2018-09-05 NOTE — PROGRESS NOTES
Per [place] rotocol, gave pt. [place] injection to left ventro gluteal area. Pt. advised to remain in the waiting area for 15 minutes to ensure no adverse reaction. Pt. given future Neisha dates. diana arrington

## 2018-09-12 ENCOUNTER — CLINICAL SUPPORT (OUTPATIENT)
Dept: OBSTETRICS AND GYNECOLOGY | Facility: CLINIC | Age: 33
End: 2018-09-12
Payer: MEDICAID

## 2018-09-12 VITALS
BODY MASS INDEX: 25.02 KG/M2 | DIASTOLIC BLOOD PRESSURE: 69 MMHG | SYSTOLIC BLOOD PRESSURE: 112 MMHG | WEIGHT: 128.06 LBS

## 2018-09-12 DIAGNOSIS — N88.3 INCOMPETENCE OF CERVIX: Primary | ICD-10-CM

## 2018-09-12 PROCEDURE — 96372 THER/PROPH/DIAG INJ SC/IM: CPT | Mod: PBBFAC,PN

## 2018-09-12 RX ADMIN — HYDROXYPROGESTERONE CAPROATE 250 MG: 250 INJECTION INTRAMUSCULAR at 01:09

## 2018-09-12 NOTE — PROGRESS NOTES
Per [place] protocol, gave pt. Neisha injection to right ventro gluteal area. Pt. advised to remain in the waiting area for 15 minutes to ensure no adverse reaction. Pt. given future depo dates. ssmith,lpn

## 2018-09-19 ENCOUNTER — CLINICAL SUPPORT (OUTPATIENT)
Dept: OBSTETRICS AND GYNECOLOGY | Facility: CLINIC | Age: 33
End: 2018-09-19
Payer: MEDICAID

## 2018-09-19 VITALS
BODY MASS INDEX: 25.06 KG/M2 | WEIGHT: 128.31 LBS | DIASTOLIC BLOOD PRESSURE: 78 MMHG | SYSTOLIC BLOOD PRESSURE: 114 MMHG

## 2018-09-19 DIAGNOSIS — N88.3 INCOMPETENCE OF CERVIX: Primary | ICD-10-CM

## 2018-09-19 PROCEDURE — 96372 THER/PROPH/DIAG INJ SC/IM: CPT | Mod: PBBFAC,PN

## 2018-09-19 PROCEDURE — 99212 OFFICE O/P EST SF 10 MIN: CPT | Mod: PBBFAC,PN,25

## 2018-09-19 PROCEDURE — 99999 PR PBB SHADOW E&M-EST. PATIENT-LVL II: CPT | Mod: PBBFAC,,,

## 2018-09-19 RX ADMIN — HYDROXYPROGESTERONE CAPROATE 250 MG: 250 INJECTION INTRAMUSCULAR at 01:09

## 2018-09-19 NOTE — PROGRESS NOTES
Pt identified using 2 identifiers, name and . Pt agreed to [place] injection. Given in L dorsogluteal. Pt tolerated without complaints. Pt instructed to wait 15 min to monitor for S&S, verbalized understanding. No S&S noted at this time.     GEE aJrquin LPN

## 2018-09-26 ENCOUNTER — PROCEDURE VISIT (OUTPATIENT)
Dept: OBSTETRICS AND GYNECOLOGY | Facility: CLINIC | Age: 33
End: 2018-09-26
Payer: MEDICAID

## 2018-09-26 ENCOUNTER — ROUTINE PRENATAL (OUTPATIENT)
Dept: OBSTETRICS AND GYNECOLOGY | Facility: CLINIC | Age: 33
End: 2018-09-26
Payer: MEDICAID

## 2018-09-26 VITALS — SYSTOLIC BLOOD PRESSURE: 98 MMHG | WEIGHT: 137 LBS | DIASTOLIC BLOOD PRESSURE: 60 MMHG | BODY MASS INDEX: 26.76 KG/M2

## 2018-09-26 DIAGNOSIS — Z98.890 HISTORY OF CERCLAGE, CURRENTLY PREGNANT: ICD-10-CM

## 2018-09-26 DIAGNOSIS — O09.299 HISTORY OF CERCLAGE, CURRENTLY PREGNANT: ICD-10-CM

## 2018-09-26 DIAGNOSIS — Z34.80 ENCOUNTER FOR SUPERVISION OF NORMAL PREGNANCY IN MULTIGRAVIDA: ICD-10-CM

## 2018-09-26 DIAGNOSIS — N88.3 INCOMPETENCE OF CERVIX: Primary | ICD-10-CM

## 2018-09-26 PROCEDURE — 99212 OFFICE O/P EST SF 10 MIN: CPT | Mod: PBBFAC,TH,PN,25 | Performed by: ADVANCED PRACTICE MIDWIFE

## 2018-09-26 PROCEDURE — 99213 OFFICE O/P EST LOW 20 MIN: CPT | Mod: 25,TH,S$PBB, | Performed by: ADVANCED PRACTICE MIDWIFE

## 2018-09-26 PROCEDURE — 76816 OB US FOLLOW-UP PER FETUS: CPT | Mod: 26,S$PBB,, | Performed by: OBSTETRICS & GYNECOLOGY

## 2018-09-26 PROCEDURE — 96372 THER/PROPH/DIAG INJ SC/IM: CPT | Mod: PBBFAC,PN

## 2018-09-26 PROCEDURE — 90686 IIV4 VACC NO PRSV 0.5 ML IM: CPT | Mod: PBBFAC,SL,PN

## 2018-09-26 PROCEDURE — 99999 PR PBB SHADOW E&M-EST. PATIENT-LVL II: CPT | Mod: PBBFAC,,, | Performed by: ADVANCED PRACTICE MIDWIFE

## 2018-09-26 PROCEDURE — 76816 OB US FOLLOW-UP PER FETUS: CPT | Mod: PBBFAC,PN | Performed by: OBSTETRICS & GYNECOLOGY

## 2018-09-26 RX ADMIN — HYDROXYPROGESTERONE CAPROATE 250 MG: 250 INJECTION INTRAMUSCULAR at 11:09

## 2018-09-26 NOTE — PROGRESS NOTES
* Flu vac given IM right deltoid. Order per LIO Zheng. Advised pt. To remain 15 minutes after injection, no complications.   *Neisha administered over 1 minute right ventrogluteal. Order per Dr. WILLIAMS Lema on 6/18/18.  Next injection due in 1 week. Injection scheduled for 10/3/18. Advised pt. To remain 15 min. After injection. No complications.

## 2018-09-26 NOTE — PROGRESS NOTES
Reports good FM and denies S/S PTL.  [place] and FLU shot today.  FKC's reviewed. Anatomy scan complete.  Coffective counseling sheet Nourish discussed with mother. Reinforced basic breastfeeding position and latch as well as proper hand expression technique. Encouraged mother to download Coffective mobile tamara if she has not already done so.  Mother verbalizes understanding.  T3 labs with RTC 4 weeks.

## 2018-10-03 ENCOUNTER — CLINICAL SUPPORT (OUTPATIENT)
Dept: OBSTETRICS AND GYNECOLOGY | Facility: CLINIC | Age: 33
End: 2018-10-03
Payer: MEDICAID

## 2018-10-03 DIAGNOSIS — N88.3 INCOMPETENCE OF CERVIX: Primary | ICD-10-CM

## 2018-10-03 DIAGNOSIS — O09.299 HISTORY OF CERCLAGE, CURRENTLY PREGNANT: ICD-10-CM

## 2018-10-03 DIAGNOSIS — Z98.890 HISTORY OF CERCLAGE, CURRENTLY PREGNANT: ICD-10-CM

## 2018-10-03 PROCEDURE — 99999 PR PBB SHADOW E&M-EST. PATIENT-LVL II: CPT | Mod: PBBFAC,,,

## 2018-10-03 PROCEDURE — 99212 OFFICE O/P EST SF 10 MIN: CPT | Mod: PBBFAC,TH,PN

## 2018-10-03 NOTE — PROGRESS NOTES
Per [place] protocol, gave pt. Neisha injection to right outer arm (back) area. Pt. advised to remain in the waiting area for 15 minutes to ensure no adverse reaction. Pt. given future [place] dates. diana arrington

## 2018-10-10 ENCOUNTER — CLINICAL SUPPORT (OUTPATIENT)
Dept: OBSTETRICS AND GYNECOLOGY | Facility: CLINIC | Age: 33
End: 2018-10-10
Payer: MEDICAID

## 2018-10-10 VITALS
SYSTOLIC BLOOD PRESSURE: 126 MMHG | DIASTOLIC BLOOD PRESSURE: 84 MMHG | BODY MASS INDEX: 26.69 KG/M2 | WEIGHT: 136.69 LBS

## 2018-10-10 DIAGNOSIS — N88.3 INCOMPETENCE OF CERVIX: Primary | ICD-10-CM

## 2018-10-10 PROCEDURE — 96372 THER/PROPH/DIAG INJ SC/IM: CPT | Mod: PBBFAC,PN

## 2018-10-10 PROCEDURE — 99999 PR PBB SHADOW E&M-EST. PATIENT-LVL I: CPT | Mod: PBBFAC,,,

## 2018-10-10 PROCEDURE — 99211 OFF/OP EST MAY X REQ PHY/QHP: CPT | Mod: PBBFAC,PN,25

## 2018-10-10 RX ADMIN — HYDROXYPROGESTERONE CAPROATE 250 MG: 250 INJECTION INTRAMUSCULAR at 03:10

## 2018-10-10 NOTE — PROGRESS NOTES
Per [place] protocol, gave pt. Neisha injection to right ventro gluteal area. Pt. advised to remain in the waiting area for 15 minutes to ensure no adverse reaction. Pt. given future [place] dates. diana arrington

## 2018-10-17 ENCOUNTER — CLINICAL SUPPORT (OUTPATIENT)
Dept: OBSTETRICS AND GYNECOLOGY | Facility: CLINIC | Age: 33
End: 2018-10-17
Payer: MEDICAID

## 2018-10-17 DIAGNOSIS — N88.3 INCOMPETENCE OF CERVIX: Primary | ICD-10-CM

## 2018-10-17 PROCEDURE — 99999 PR PBB SHADOW E&M-EST. PATIENT-LVL I: CPT | Mod: PBBFAC,,,

## 2018-10-17 PROCEDURE — 99211 OFF/OP EST MAY X REQ PHY/QHP: CPT | Mod: PBBFAC,PN,25

## 2018-10-17 PROCEDURE — 96372 THER/PROPH/DIAG INJ SC/IM: CPT | Mod: PBBFAC,PN

## 2018-10-17 RX ADMIN — HYDROXYPROGESTERONE CAPROATE 250 MG: 250 INJECTION INTRAMUSCULAR at 02:10

## 2018-10-17 NOTE — PROGRESS NOTES
Pt identified using 2 identifiers, name and . Pt agreed to [place] injection. Given in L ventrogluteal. Pt tolerated without complaints. Pt instructed to wait 15 min to monitor for S&S, verbalized understanding. No S&S noted at this time.     GEE Jarquin LPN

## 2018-10-24 ENCOUNTER — LAB VISIT (OUTPATIENT)
Dept: LAB | Facility: HOSPITAL | Age: 33
End: 2018-10-24
Attending: ADVANCED PRACTICE MIDWIFE
Payer: MEDICAID

## 2018-10-24 ENCOUNTER — ROUTINE PRENATAL (OUTPATIENT)
Dept: OBSTETRICS AND GYNECOLOGY | Facility: CLINIC | Age: 33
End: 2018-10-24
Payer: MEDICAID

## 2018-10-24 VITALS — DIASTOLIC BLOOD PRESSURE: 78 MMHG | SYSTOLIC BLOOD PRESSURE: 116 MMHG | BODY MASS INDEX: 26.46 KG/M2 | WEIGHT: 135.5 LBS

## 2018-10-24 DIAGNOSIS — O09.299 HISTORY OF CERCLAGE, CURRENTLY PREGNANT: ICD-10-CM

## 2018-10-24 DIAGNOSIS — Z34.80 ENCOUNTER FOR SUPERVISION OF NORMAL PREGNANCY IN MULTIGRAVIDA: ICD-10-CM

## 2018-10-24 DIAGNOSIS — N88.3 INCOMPETENCE OF CERVIX: Primary | ICD-10-CM

## 2018-10-24 DIAGNOSIS — Z98.890 HISTORY OF CERCLAGE, CURRENTLY PREGNANT: ICD-10-CM

## 2018-10-24 LAB
BASOPHILS # BLD AUTO: 0.01 K/UL
BASOPHILS NFR BLD: 0.2 %
DIFFERENTIAL METHOD: ABNORMAL
EOSINOPHIL # BLD AUTO: 0 K/UL
EOSINOPHIL NFR BLD: 0.3 %
ERYTHROCYTE [DISTWIDTH] IN BLOOD BY AUTOMATED COUNT: 13.5 %
GLUCOSE SERPL-MCNC: 108 MG/DL
HCT VFR BLD AUTO: 32.7 %
HGB BLD-MCNC: 11 G/DL
IMM GRANULOCYTES # BLD AUTO: 0.02 K/UL
IMM GRANULOCYTES NFR BLD AUTO: 0.3 %
LYMPHOCYTES # BLD AUTO: 0.7 K/UL
LYMPHOCYTES NFR BLD: 12.3 %
MCH RBC QN AUTO: 32.7 PG
MCHC RBC AUTO-ENTMCNC: 33.6 G/DL
MCV RBC AUTO: 97 FL
MONOCYTES # BLD AUTO: 0.2 K/UL
MONOCYTES NFR BLD: 3.8 %
NEUTROPHILS # BLD AUTO: 4.8 K/UL
NEUTROPHILS NFR BLD: 83.1 %
NRBC BLD-RTO: 0 /100 WBC
PLATELET # BLD AUTO: 132 K/UL
PMV BLD AUTO: 9.9 FL
RBC # BLD AUTO: 3.36 M/UL
WBC # BLD AUTO: 5.78 K/UL

## 2018-10-24 PROCEDURE — 99213 OFFICE O/P EST LOW 20 MIN: CPT | Mod: TH,S$PBB,, | Performed by: ADVANCED PRACTICE MIDWIFE

## 2018-10-24 PROCEDURE — 99212 OFFICE O/P EST SF 10 MIN: CPT | Mod: PBBFAC,TH,PN | Performed by: ADVANCED PRACTICE MIDWIFE

## 2018-10-24 PROCEDURE — 82950 GLUCOSE TEST: CPT

## 2018-10-24 PROCEDURE — 36415 COLL VENOUS BLD VENIPUNCTURE: CPT | Mod: PO

## 2018-10-24 PROCEDURE — 85025 COMPLETE CBC W/AUTO DIFF WBC: CPT

## 2018-10-24 PROCEDURE — 86703 HIV-1/HIV-2 1 RESULT ANTBDY: CPT

## 2018-10-24 PROCEDURE — 99999 PR PBB SHADOW E&M-EST. PATIENT-LVL II: CPT | Mod: PBBFAC,,, | Performed by: ADVANCED PRACTICE MIDWIFE

## 2018-10-24 PROCEDURE — 86592 SYPHILIS TEST NON-TREP QUAL: CPT

## 2018-10-24 RX ADMIN — HYDROXYPROGESTERONE CAPROATE 250 MG: 250 INJECTION INTRAMUSCULAR at 10:10

## 2018-10-24 NOTE — PROGRESS NOTES
Reports good FM.  Neisha today.  S/S PTL, and FKC's reviewed.  T3 labs today. Coffective counseling sheet Keep Baby Close discussed with mother. Reinforced rooming in practices, continued skin to skin, and quiet hours as requested by mother.  Encouraged mother to download Coffective mobile tamara if she has not already done so. Mother verbalizes understanding.

## 2018-10-24 NOTE — NURSING
Pt identified using 2 identifiers, name and . Pt agreed to [place] injection. Given in R ventrogluteal. Pt tolerated without complaints. Pt instructed to wait 15 min to monitor for S&S, verbalized understanding. No S&S noted at this time.     GEE Jarquin LPN

## 2018-10-25 ENCOUNTER — TELEPHONE (OUTPATIENT)
Dept: OBSTETRICS AND GYNECOLOGY | Facility: CLINIC | Age: 33
End: 2018-10-25

## 2018-10-25 LAB
HIV 1+2 AB+HIV1 P24 AG SERPL QL IA: NEGATIVE
RPR SER QL: NORMAL

## 2018-10-25 NOTE — TELEPHONE ENCOUNTER
----- Message from Renee Forbes sent at 10/25/2018  3:28 PM CDT -----  Contact: PT  States she's calling regarding her lab results from yesterday. Please call pt at [phone]. Thank you

## 2018-10-31 ENCOUNTER — CLINICAL SUPPORT (OUTPATIENT)
Dept: OBSTETRICS AND GYNECOLOGY | Facility: CLINIC | Age: 33
End: 2018-10-31
Payer: MEDICAID

## 2018-10-31 DIAGNOSIS — N88.3 INCOMPETENCE OF CERVIX: Primary | ICD-10-CM

## 2018-10-31 PROCEDURE — 96372 THER/PROPH/DIAG INJ SC/IM: CPT | Mod: PBBFAC,PN

## 2018-10-31 RX ADMIN — HYDROXYPROGESTERONE CAPROATE 250 MG: 250 INJECTION INTRAMUSCULAR at 12:10

## 2018-10-31 NOTE — PROGRESS NOTES
Per [place] protocol, gave pt. Neisha injection to left ventro gluteal area. Pt. advised to remain in the waiting area for 15 minutes to ensure no adverse reaction. Pt. given future Neisha dates. diana arrington

## 2018-11-07 ENCOUNTER — ROUTINE PRENATAL (OUTPATIENT)
Dept: OBSTETRICS AND GYNECOLOGY | Facility: CLINIC | Age: 33
End: 2018-11-07
Payer: MEDICAID

## 2018-11-07 VITALS — BODY MASS INDEX: 26.26 KG/M2 | SYSTOLIC BLOOD PRESSURE: 102 MMHG | DIASTOLIC BLOOD PRESSURE: 68 MMHG | WEIGHT: 134.5 LBS

## 2018-11-07 PROCEDURE — 96372 THER/PROPH/DIAG INJ SC/IM: CPT | Mod: PBBFAC,PN

## 2018-11-07 PROCEDURE — 99212 OFFICE O/P EST SF 10 MIN: CPT | Mod: PBBFAC,PN | Performed by: ADVANCED PRACTICE MIDWIFE

## 2018-11-07 PROCEDURE — 99999 PR PBB SHADOW E&M-EST. PATIENT-LVL II: CPT | Mod: PBBFAC,,, | Performed by: ADVANCED PRACTICE MIDWIFE

## 2018-11-07 PROCEDURE — 99213 OFFICE O/P EST LOW 20 MIN: CPT | Mod: TH,S$PBB,, | Performed by: ADVANCED PRACTICE MIDWIFE

## 2018-11-07 RX ADMIN — HYDROXYPROGESTERONE CAPROATE 250 MG: 250 INJECTION INTRAMUSCULAR at 01:11

## 2018-11-07 NOTE — NURSING
Pt identified using 2 identifiers, name and . Pt agreed to [place] injection. Given in L ventrogluteal. Pt tolerated without complaints. Pt instructed to wait 15 min to monitor for S&S, verbalized understanding. No S&S noted at this time.     GEE Jarquin LPN

## 2018-11-07 NOTE — PROGRESS NOTES
Reports good FM and denies S/S PTL.  [place] today.  BTL consent signed, advised permanent. Coffective counseling sheet Protect Breastfeeding discussed with mother. Reinforced avoidence of artifical nipples and formula, unless medically indicated.  Encouraged mother to download Coffective mobile tamara if she has not already done so. Mother verbalizes understanding. RTC 2 weeks.  FKC's reviewed, US with RTC to complete anatomy

## 2018-11-14 ENCOUNTER — CLINICAL SUPPORT (OUTPATIENT)
Dept: OBSTETRICS AND GYNECOLOGY | Facility: CLINIC | Age: 33
End: 2018-11-14
Payer: MEDICAID

## 2018-11-14 DIAGNOSIS — Z87.51 HISTORY OF PREMATURE DELIVERY: Primary | ICD-10-CM

## 2018-11-14 NOTE — PROGRESS NOTES
Per [place] protocol, gave pt. Neisha injection to right ventro gluteal area. Pt. advised to remain in the waiting area for 15 minutes to ensure no adverse reaction. Pt. given future [place] dates. diana arrington

## 2018-11-15 ENCOUNTER — TELEPHONE (OUTPATIENT)
Dept: PHARMACY | Facility: CLINIC | Age: 33
End: 2018-11-15

## 2018-11-15 NOTE — TELEPHONE ENCOUNTER
Paula PURI at [facility] [id number]) states that pt has 3 josh injections on hand at the office. Spoke to patient she has had no changes and no questions for the pharmacist at this time. She has injections on hand for 11/21, 11/28 and 12/05. We will pend her refill activity to 11/28/18

## 2018-11-21 ENCOUNTER — PROCEDURE VISIT (OUTPATIENT)
Dept: OBSTETRICS AND GYNECOLOGY | Facility: CLINIC | Age: 33
End: 2018-11-21
Payer: MEDICAID

## 2018-11-21 ENCOUNTER — ROUTINE PRENATAL (OUTPATIENT)
Dept: OBSTETRICS AND GYNECOLOGY | Facility: CLINIC | Age: 33
End: 2018-11-21
Payer: MEDICAID

## 2018-11-21 VITALS
DIASTOLIC BLOOD PRESSURE: 70 MMHG | SYSTOLIC BLOOD PRESSURE: 104 MMHG | WEIGHT: 136.44 LBS | BODY MASS INDEX: 26.65 KG/M2

## 2018-11-21 DIAGNOSIS — Z98.890 HISTORY OF CERCLAGE, CURRENTLY PREGNANT: ICD-10-CM

## 2018-11-21 DIAGNOSIS — D64.9 ANEMIA, UNSPECIFIED TYPE: ICD-10-CM

## 2018-11-21 DIAGNOSIS — Z98.890 HISTORY OF CERCLAGE, CURRENTLY PREGNANT: Primary | ICD-10-CM

## 2018-11-21 DIAGNOSIS — O09.299 HISTORY OF CERCLAGE, CURRENTLY PREGNANT: ICD-10-CM

## 2018-11-21 DIAGNOSIS — O36.5930 SMALL FOR GESTATIONAL AGE FETUS AFFECTING MANAGEMENT OF MOTHER IN SINGLETON PREGNANCY IN THIRD TRIMESTER: ICD-10-CM

## 2018-11-21 DIAGNOSIS — O09.299 HISTORY OF CERCLAGE, CURRENTLY PREGNANT: Primary | ICD-10-CM

## 2018-11-21 PROCEDURE — 76816 OB US FOLLOW-UP PER FETUS: CPT | Mod: PBBFAC,PN | Performed by: OBSTETRICS & GYNECOLOGY

## 2018-11-21 PROCEDURE — 99999 PR PBB SHADOW E&M-EST. PATIENT-LVL II: CPT | Mod: PBBFAC,,, | Performed by: ADVANCED PRACTICE MIDWIFE

## 2018-11-21 PROCEDURE — 99213 OFFICE O/P EST LOW 20 MIN: CPT | Mod: TH,S$PBB,, | Performed by: ADVANCED PRACTICE MIDWIFE

## 2018-11-21 PROCEDURE — 76819 FETAL BIOPHYS PROFIL W/O NST: CPT | Mod: PBBFAC,PN | Performed by: OBSTETRICS & GYNECOLOGY

## 2018-11-21 PROCEDURE — 76816 OB US FOLLOW-UP PER FETUS: CPT | Mod: 26,S$PBB,, | Performed by: OBSTETRICS & GYNECOLOGY

## 2018-11-21 PROCEDURE — 76819 FETAL BIOPHYS PROFIL W/O NST: CPT | Mod: 26,S$PBB,, | Performed by: OBSTETRICS & GYNECOLOGY

## 2018-11-21 PROCEDURE — 99212 OFFICE O/P EST SF 10 MIN: CPT | Mod: PBBFAC,TH,PN,25 | Performed by: ADVANCED PRACTICE MIDWIFE

## 2018-11-21 RX ORDER — FERROUS SULFATE 325(65) MG
325 TABLET, DELAYED RELEASE (ENTERIC COATED) ORAL 2 TIMES DAILY
Qty: 60 TABLET | Refills: 3 | Status: SHIPPED | OUTPATIENT
Start: 2018-11-21 | End: 2019-07-12

## 2018-11-21 RX ADMIN — HYDROXYPROGESTERONE CAPROATE 250 MG: 250 INJECTION INTRAMUSCULAR at 09:11

## 2018-11-21 NOTE — PROGRESS NOTES
Reports good FM.  Growth scan reviewed, EFW 14%, VTX, MVP 3.6, BPP 8 of 8.  Quiet hours reviewed.  S/S PTL and FKC's reviewed.  RTC 2 weeks

## 2018-11-21 NOTE — NURSING
Pt identified using 2 identifiers, name and . Pt agreed to [place] injection. Given in L ventrogluteal. Pt tolerated without complaints. Pt instructed to wait 15 min to monitor for S&S, verbalized understanding. No S&S noted at this time.     GEE Jarquin LPN

## 2018-11-28 ENCOUNTER — TELEPHONE (OUTPATIENT)
Dept: OBSTETRICS AND GYNECOLOGY | Facility: CLINIC | Age: 33
End: 2018-11-28

## 2018-11-28 ENCOUNTER — CLINICAL SUPPORT (OUTPATIENT)
Dept: OBSTETRICS AND GYNECOLOGY | Facility: CLINIC | Age: 33
End: 2018-11-28
Payer: MEDICAID

## 2018-11-28 DIAGNOSIS — O09.299 HISTORY OF CERCLAGE, CURRENTLY PREGNANT: ICD-10-CM

## 2018-11-28 DIAGNOSIS — N88.3 INCOMPETENCE OF CERVIX: ICD-10-CM

## 2018-11-28 DIAGNOSIS — N88.3 CERVIX INCOMPETENCE: Primary | ICD-10-CM

## 2018-11-28 DIAGNOSIS — Z98.890 HISTORY OF CERCLAGE, CURRENTLY PREGNANT: ICD-10-CM

## 2018-11-28 PROCEDURE — 99999 PR PBB SHADOW E&M-EST. PATIENT-LVL I: CPT | Mod: PBBFAC,,,

## 2018-11-28 PROCEDURE — 96372 THER/PROPH/DIAG INJ SC/IM: CPT | Mod: PBBFAC,PN

## 2018-11-28 PROCEDURE — 99211 OFF/OP EST MAY X REQ PHY/QHP: CPT | Mod: PBBFAC,PN,25

## 2018-11-28 RX ORDER — HYDROXYPROGESTERONE CAPROATE 250 MG/ML
250 INJECTION INTRAMUSCULAR
Qty: 5 ML | Refills: 4 | Status: SHIPPED | OUTPATIENT
Start: 2018-11-28 | End: 2019-01-10 | Stop reason: ALTCHOICE

## 2018-11-28 RX ADMIN — HYDROXYPROGESTERONE CAPROATE 250 MG: 250 INJECTION INTRAMUSCULAR at 02:11

## 2018-11-28 NOTE — PROGRESS NOTES
Per [place] protocol, gave pt. Neisha injection to left ventro gluteal area. Pt. advised to remain in the waiting area for 15 minutes to ensure no adverse reaction. Pt. given future Neisha dates. diana arrington

## 2018-11-29 ENCOUNTER — TELEPHONE (OUTPATIENT)
Dept: PHARMACY | Facility: CLINIC | Age: 33
End: 2018-11-29

## 2018-11-29 NOTE — TELEPHONE ENCOUNTER
Patient returned phone call back regard specialty medication refill for [place] 250mg/mL $0/004- Patient scheduled to have medication  on Fri 12/7 to her provider's office. Patient informed no new medications, conditions or allergies since last talked to [facility]. Patient declined questions for the clinical pharmacist. Patient voiced understanding.   
Alert and oriented to person, place and time

## 2018-12-03 ENCOUNTER — ROUTINE PRENATAL (OUTPATIENT)
Dept: OBSTETRICS AND GYNECOLOGY | Facility: CLINIC | Age: 33
End: 2018-12-03
Payer: MEDICAID

## 2018-12-03 VITALS — DIASTOLIC BLOOD PRESSURE: 72 MMHG | BODY MASS INDEX: 26.76 KG/M2 | WEIGHT: 137 LBS | SYSTOLIC BLOOD PRESSURE: 110 MMHG

## 2018-12-03 DIAGNOSIS — O09.299 HISTORY OF CERCLAGE, CURRENTLY PREGNANT: Primary | ICD-10-CM

## 2018-12-03 DIAGNOSIS — Z98.890 HISTORY OF CERCLAGE, CURRENTLY PREGNANT: Primary | ICD-10-CM

## 2018-12-03 PROCEDURE — 99999 PR PBB SHADOW E&M-EST. PATIENT-LVL II: CPT | Mod: PBBFAC,,, | Performed by: ADVANCED PRACTICE MIDWIFE

## 2018-12-03 PROCEDURE — 99213 OFFICE O/P EST LOW 20 MIN: CPT | Mod: TH,S$PBB,, | Performed by: ADVANCED PRACTICE MIDWIFE

## 2018-12-03 PROCEDURE — 99212 OFFICE O/P EST SF 10 MIN: CPT | Mod: PBBFAC,TH,PN | Performed by: ADVANCED PRACTICE MIDWIFE

## 2018-12-05 ENCOUNTER — CLINICAL SUPPORT (OUTPATIENT)
Dept: OBSTETRICS AND GYNECOLOGY | Facility: CLINIC | Age: 33
End: 2018-12-05
Payer: MEDICAID

## 2018-12-05 VITALS — BODY MASS INDEX: 26.76 KG/M2 | HEIGHT: 60 IN

## 2018-12-05 DIAGNOSIS — N88.3 CERVIX INCOMPETENCE: Primary | ICD-10-CM

## 2018-12-05 PROCEDURE — 99999 PR PBB SHADOW E&M-EST. PATIENT-LVL II: CPT | Mod: PBBFAC,,,

## 2018-12-05 PROCEDURE — 99212 OFFICE O/P EST SF 10 MIN: CPT | Mod: PBBFAC,PN,25

## 2018-12-05 PROCEDURE — 96372 THER/PROPH/DIAG INJ SC/IM: CPT | Mod: PBBFAC,PN

## 2018-12-05 RX ADMIN — HYDROXYPROGESTERONE CAPROATE 250 MG: 250 INJECTION INTRAMUSCULAR at 02:12

## 2018-12-05 NOTE — PROGRESS NOTES
Per [place] protocol, gave pt. Neisha injection to right ventro gluteal area. Pt. advised to remain in the waiting area for 15 minutes to ensure no adverse reaction. Pt. given future depo dates. ssmith,lpn

## 2018-12-12 ENCOUNTER — CLINICAL SUPPORT (OUTPATIENT)
Dept: OBSTETRICS AND GYNECOLOGY | Facility: CLINIC | Age: 33
End: 2018-12-12
Payer: MEDICAID

## 2018-12-12 PROCEDURE — 96372 THER/PROPH/DIAG INJ SC/IM: CPT | Mod: PBBFAC,PN

## 2018-12-12 PROCEDURE — 99211 OFF/OP EST MAY X REQ PHY/QHP: CPT | Mod: PBBFAC,PN,25

## 2018-12-12 PROCEDURE — 99999 PR PBB SHADOW E&M-EST. PATIENT-LVL I: CPT | Mod: PBBFAC,,,

## 2018-12-12 RX ADMIN — HYDROXYPROGESTERONE CAPROATE 250 MG: 250 INJECTION INTRAMUSCULAR at 01:12

## 2018-12-12 NOTE — PROGRESS NOTES
After identifying patient with 2 identifiers, verifying that patient wished to receive Neisha, reviewing allergies, [place] 250 mg administered to left ventrogluteal. Patient tolerated well. Patient instructed to wait 15 minutes and verbalized understanding.  Next appointment scheduled.

## 2018-12-19 ENCOUNTER — ROUTINE PRENATAL (OUTPATIENT)
Dept: OBSTETRICS AND GYNECOLOGY | Facility: CLINIC | Age: 33
End: 2018-12-19
Payer: MEDICAID

## 2018-12-19 ENCOUNTER — TELEPHONE (OUTPATIENT)
Dept: OBSTETRICS AND GYNECOLOGY | Facility: CLINIC | Age: 33
End: 2018-12-19

## 2018-12-19 VITALS — SYSTOLIC BLOOD PRESSURE: 114 MMHG | DIASTOLIC BLOOD PRESSURE: 76 MMHG

## 2018-12-19 DIAGNOSIS — Z30.9 ENCOUNTER FOR CONTRACEPTIVE MANAGEMENT, UNSPECIFIED TYPE: ICD-10-CM

## 2018-12-19 DIAGNOSIS — O09.299 HISTORY OF CERCLAGE, CURRENTLY PREGNANT: ICD-10-CM

## 2018-12-19 DIAGNOSIS — Z98.890 HISTORY OF CERCLAGE, CURRENTLY PREGNANT: ICD-10-CM

## 2018-12-19 DIAGNOSIS — O26.849 UTERINE SIZE DATE DISCREPANCY PREGNANCY: Primary | ICD-10-CM

## 2018-12-19 PROCEDURE — 99213 OFFICE O/P EST LOW 20 MIN: CPT | Mod: TH,S$PBB,, | Performed by: ADVANCED PRACTICE MIDWIFE

## 2018-12-19 PROCEDURE — 99999 PR PBB SHADOW E&M-EST. PATIENT-LVL II: CPT | Mod: PBBFAC,,, | Performed by: ADVANCED PRACTICE MIDWIFE

## 2018-12-19 PROCEDURE — 96372 THER/PROPH/DIAG INJ SC/IM: CPT | Mod: PBBFAC,PN

## 2018-12-19 PROCEDURE — 99212 OFFICE O/P EST SF 10 MIN: CPT | Mod: PBBFAC,TH,PN,25 | Performed by: ADVANCED PRACTICE MIDWIFE

## 2018-12-19 RX ADMIN — HYDROXYPROGESTERONE CAPROATE 250 MG: 250 INJECTION INTRAMUSCULAR at 11:12

## 2018-12-19 NOTE — PROGRESS NOTES
After identifying patient with 2 identifiers, verifying that patient wished to receive Neisha, reviewing allergies, [place] 250 mg administered to right ventrogluteal. Patient tolerated well.  Patient instructed to wait 15 minutes and verbalized understanding.  Next appointment scheduled.

## 2018-12-19 NOTE — TELEPHONE ENCOUNTER
----- Message from Heidi Fletcher sent at 12/19/2018  8:51 AM CST -----  Contact: Self  Pt is calling because she cancelled her appt for today, but would like to reschedule it; however,  was unable to find availability.  Pt says she is pregnant and see her weekly, in addition to getting a shot.  She is requesting a returned call.    She can be reached at [phone].    Thank you.

## 2018-12-19 NOTE — PROGRESS NOTES
Reports good FM.  C/O discomfort at night, hard to sleep.  Last [place] today.  Patient will see Dr Lema Thursday 12/27 for cerclage removal.  She has an US for EFW and F/U visit also scheduled for 1/3/19.  FKC's and S/S PTL reviewed.

## 2018-12-19 NOTE — TELEPHONE ENCOUNTER
Spoke to patient and she states she had cancelled her appointment for today because it was at 1:00pm and her kids get out of school during that time. Rescheduled appointment for today 12/19/18 at 11:40am at the [place] location with MARCELA Carranza. Patient verbalized understanding.

## 2018-12-27 ENCOUNTER — ROUTINE PRENATAL (OUTPATIENT)
Dept: OBSTETRICS AND GYNECOLOGY | Facility: CLINIC | Age: 33
End: 2018-12-27
Payer: MEDICAID

## 2018-12-27 ENCOUNTER — LAB VISIT (OUTPATIENT)
Dept: LAB | Facility: HOSPITAL | Age: 33
End: 2018-12-27
Attending: OBSTETRICS & GYNECOLOGY
Payer: MEDICAID

## 2018-12-27 VITALS — DIASTOLIC BLOOD PRESSURE: 72 MMHG | SYSTOLIC BLOOD PRESSURE: 114 MMHG

## 2018-12-27 DIAGNOSIS — Z36.85 ANTENATAL SCREENING FOR STREPTOCOCCUS B: ICD-10-CM

## 2018-12-27 DIAGNOSIS — N88.3 INCOMPETENCE OF CERVIX: ICD-10-CM

## 2018-12-27 DIAGNOSIS — O09.299 HISTORY OF CERCLAGE, CURRENTLY PREGNANT: ICD-10-CM

## 2018-12-27 DIAGNOSIS — Z34.83 ENCOUNTER FOR SUPERVISION OF OTHER NORMAL PREGNANCY IN THIRD TRIMESTER: ICD-10-CM

## 2018-12-27 DIAGNOSIS — Z36.85 ANTENATAL SCREENING FOR STREPTOCOCCUS B: Primary | ICD-10-CM

## 2018-12-27 DIAGNOSIS — Z98.890 HISTORY OF CERCLAGE, CURRENTLY PREGNANT: ICD-10-CM

## 2018-12-27 LAB
ERYTHROCYTE [DISTWIDTH] IN BLOOD BY AUTOMATED COUNT: 12.6 %
HCT VFR BLD AUTO: 32.3 %
HGB BLD-MCNC: 10.8 G/DL
MCH RBC QN AUTO: 30.8 PG
MCHC RBC AUTO-ENTMCNC: 33.4 G/DL
MCV RBC AUTO: 92 FL
PLATELET # BLD AUTO: 132 K/UL
PMV BLD AUTO: 10.5 FL
RBC # BLD AUTO: 3.51 M/UL
WBC # BLD AUTO: 7.28 K/UL

## 2018-12-27 PROCEDURE — 86592 SYPHILIS TEST NON-TREP QUAL: CPT

## 2018-12-27 PROCEDURE — 36415 COLL VENOUS BLD VENIPUNCTURE: CPT | Mod: PO

## 2018-12-27 PROCEDURE — 87081 CULTURE SCREEN ONLY: CPT

## 2018-12-27 PROCEDURE — 99212 OFFICE O/P EST SF 10 MIN: CPT | Mod: PBBFAC,TH,PN,25 | Performed by: OBSTETRICS & GYNECOLOGY

## 2018-12-27 PROCEDURE — 99213 OFFICE O/P EST LOW 20 MIN: CPT | Mod: TH,S$PBB,, | Performed by: OBSTETRICS & GYNECOLOGY

## 2018-12-27 PROCEDURE — 99999 PR PBB SHADOW E&M-EST. PATIENT-LVL II: CPT | Mod: PBBFAC,,, | Performed by: OBSTETRICS & GYNECOLOGY

## 2018-12-27 PROCEDURE — 59871 REMOVE CERCLAGE SUTURE: CPT | Mod: PBBFAC,PN | Performed by: OBSTETRICS & GYNECOLOGY

## 2018-12-27 PROCEDURE — 85027 COMPLETE CBC AUTOMATED: CPT

## 2018-12-27 PROCEDURE — 86703 HIV-1/HIV-2 1 RESULT ANTBDY: CPT

## 2018-12-27 NOTE — PROGRESS NOTES
+fetal movement, no srom, no vaginal bleeding  Reports increased pressure  gbs today  Cbc, hiv, rpr today  sono ordered to confirm position/size  Time out done  Strings grasped with ring forcep; cerclage cut (cerclage intact)  Bleeding stable after use of silver nitrate  Vaginal consents signed   malking milk handout given  Skin check done--advised no shaving

## 2018-12-28 LAB
HIV 1+2 AB+HIV1 P24 AG SERPL QL IA: NEGATIVE
RPR SER QL: NORMAL

## 2018-12-29 LAB — BACTERIA SPEC AEROBE CULT: NORMAL

## 2018-12-31 ENCOUNTER — TELEPHONE (OUTPATIENT)
Dept: PHARMACY | Facility: CLINIC | Age: 33
End: 2018-12-31

## 2019-01-03 ENCOUNTER — ROUTINE PRENATAL (OUTPATIENT)
Dept: OBSTETRICS AND GYNECOLOGY | Facility: CLINIC | Age: 34
End: 2019-01-03
Payer: MEDICAID

## 2019-01-03 ENCOUNTER — PROCEDURE VISIT (OUTPATIENT)
Dept: OBSTETRICS AND GYNECOLOGY | Facility: CLINIC | Age: 34
End: 2019-01-03
Payer: MEDICAID

## 2019-01-03 VITALS
SYSTOLIC BLOOD PRESSURE: 122 MMHG | DIASTOLIC BLOOD PRESSURE: 68 MMHG | WEIGHT: 138.88 LBS | BODY MASS INDEX: 27.13 KG/M2

## 2019-01-03 DIAGNOSIS — O09.299 HISTORY OF CERCLAGE, CURRENTLY PREGNANT: Primary | ICD-10-CM

## 2019-01-03 DIAGNOSIS — Z98.890 HISTORY OF CERCLAGE, CURRENTLY PREGNANT: Primary | ICD-10-CM

## 2019-01-03 DIAGNOSIS — O26.849 UTERINE SIZE DATE DISCREPANCY PREGNANCY: ICD-10-CM

## 2019-01-03 DIAGNOSIS — N88.3 INCOMPETENCE OF CERVIX: ICD-10-CM

## 2019-01-03 DIAGNOSIS — Z34.83 ENCOUNTER FOR SUPERVISION OF OTHER NORMAL PREGNANCY IN THIRD TRIMESTER: ICD-10-CM

## 2019-01-03 LAB
BACTERIA #/AREA URNS AUTO: ABNORMAL /HPF
BILIRUB UR QL STRIP: NEGATIVE
CLARITY UR REFRACT.AUTO: CLEAR
COLOR UR AUTO: ABNORMAL
GLUCOSE UR QL STRIP: NEGATIVE
HGB UR QL STRIP: NEGATIVE
HYALINE CASTS UR QL AUTO: 0 /LPF
KETONES UR QL STRIP: ABNORMAL
LEUKOCYTE ESTERASE UR QL STRIP: ABNORMAL
MICROSCOPIC COMMENT: ABNORMAL
NITRITE UR QL STRIP: NEGATIVE
PH UR STRIP: 6 [PH] (ref 5–8)
PROT UR QL STRIP: ABNORMAL
RBC #/AREA URNS AUTO: 3 /HPF (ref 0–4)
SP GR UR STRIP: 1.01 (ref 1–1.03)
SQUAMOUS #/AREA URNS AUTO: 1 /HPF
URN SPEC COLLECT METH UR: ABNORMAL
WBC #/AREA URNS AUTO: 26 /HPF (ref 0–5)

## 2019-01-03 PROCEDURE — 81001 URINALYSIS AUTO W/SCOPE: CPT

## 2019-01-03 PROCEDURE — 76816 PR  US,PREGNANT UTERUS,F/U,TRANSABD APP: ICD-10-PCS | Mod: 26,S$PBB,, | Performed by: OBSTETRICS & GYNECOLOGY

## 2019-01-03 PROCEDURE — 99213 OFFICE O/P EST LOW 20 MIN: CPT | Mod: 25,TH,S$PBB, | Performed by: ADVANCED PRACTICE MIDWIFE

## 2019-01-03 PROCEDURE — 99999 PR PBB SHADOW E&M-EST. PATIENT-LVL II: CPT | Mod: PBBFAC,,, | Performed by: ADVANCED PRACTICE MIDWIFE

## 2019-01-03 PROCEDURE — 76819 PR US, OB, FETAL BIOPHYSICAL, W/O NST: ICD-10-PCS | Mod: 26,S$PBB,, | Performed by: OBSTETRICS & GYNECOLOGY

## 2019-01-03 PROCEDURE — 99999 PR PBB SHADOW E&M-EST. PATIENT-LVL II: ICD-10-PCS | Mod: PBBFAC,,, | Performed by: ADVANCED PRACTICE MIDWIFE

## 2019-01-03 PROCEDURE — 76816 OB US FOLLOW-UP PER FETUS: CPT | Mod: 26,S$PBB,, | Performed by: OBSTETRICS & GYNECOLOGY

## 2019-01-03 PROCEDURE — 99212 OFFICE O/P EST SF 10 MIN: CPT | Mod: PBBFAC,TH,PN,25 | Performed by: ADVANCED PRACTICE MIDWIFE

## 2019-01-03 PROCEDURE — 76819 FETAL BIOPHYS PROFIL W/O NST: CPT | Mod: PBBFAC,PN | Performed by: OBSTETRICS & GYNECOLOGY

## 2019-01-03 PROCEDURE — 99213 PR OFFICE/OUTPT VISIT, EST, LEVL III, 20-29 MIN: ICD-10-PCS | Mod: 25,TH,S$PBB, | Performed by: ADVANCED PRACTICE MIDWIFE

## 2019-01-03 PROCEDURE — 76819 FETAL BIOPHYS PROFIL W/O NST: CPT | Mod: 26,S$PBB,, | Performed by: OBSTETRICS & GYNECOLOGY

## 2019-01-03 PROCEDURE — 76816 OB US FOLLOW-UP PER FETUS: CPT | Mod: PBBFAC,PN | Performed by: OBSTETRICS & GYNECOLOGY

## 2019-01-03 NOTE — PROGRESS NOTES
Doing well today. Having some UC but nothing consistent.   VE 4/80/-3 and posterior. Encouraged pt to go to hospital if SROM or if contractions become stronger.   Reviewed FKC/ROM/bleeding precautions.   Encouraged to increase hydration.   US today shows BPP 8/8, EFW 6#8oz, placenta posterior, 3VC, MVP 3.4cm, ROBE 8.3cm.

## 2019-01-04 ENCOUNTER — HOSPITAL ENCOUNTER (INPATIENT)
Facility: HOSPITAL | Age: 34
LOS: 2 days | Discharge: HOME | End: 2019-01-06
Attending: OBSTETRICS & GYNECOLOGY | Admitting: OBSTETRICS & GYNECOLOGY
Payer: MEDICAID

## 2019-01-04 DIAGNOSIS — Z37.9 NORMAL LABOR: ICD-10-CM

## 2019-01-04 LAB
ABO + RH BLD: NORMAL
BASOPHILS # BLD AUTO: 0 K/UL
BASOPHILS NFR BLD: 0 %
BLD GP AB SCN CELLS X3 SERPL QL: NORMAL
DIFFERENTIAL METHOD: ABNORMAL
EOSINOPHIL # BLD AUTO: 0 K/UL
EOSINOPHIL NFR BLD: 0.2 %
ERYTHROCYTE [DISTWIDTH] IN BLOOD BY AUTOMATED COUNT: 13.6 %
HCT VFR BLD AUTO: 32.5 %
HGB BLD-MCNC: 11.2 G/DL
LYMPHOCYTES # BLD AUTO: 0.7 K/UL
LYMPHOCYTES NFR BLD: 11.3 %
MCH RBC QN AUTO: 31.6 PG
MCHC RBC AUTO-ENTMCNC: 34.5 G/DL
MCV RBC AUTO: 92 FL
MONOCYTES # BLD AUTO: 0.5 K/UL
MONOCYTES NFR BLD: 8.4 %
NEUTROPHILS # BLD AUTO: 5 K/UL
NEUTROPHILS NFR BLD: 80.1 %
PLATELET # BLD AUTO: 143 K/UL
PMV BLD AUTO: 9.6 FL
RBC # BLD AUTO: 3.54 M/UL
WBC # BLD AUTO: 6.21 K/UL

## 2019-01-04 PROCEDURE — 72100002 HC LABOR CARE, 1ST 8 HOURS

## 2019-01-04 PROCEDURE — 72100003 HC LABOR CARE, EA. ADDL. 8 HRS

## 2019-01-04 PROCEDURE — 25000003 PHARM REV CODE 250: Performed by: ADVANCED PRACTICE MIDWIFE

## 2019-01-04 PROCEDURE — 11000001 HC ACUTE MED/SURG PRIVATE ROOM

## 2019-01-04 PROCEDURE — 99211 OFF/OP EST MAY X REQ PHY/QHP: CPT

## 2019-01-04 PROCEDURE — 86850 RBC ANTIBODY SCREEN: CPT

## 2019-01-04 PROCEDURE — 85025 COMPLETE CBC W/AUTO DIFF WBC: CPT

## 2019-01-04 RX ORDER — SODIUM CHLORIDE, SODIUM LACTATE, POTASSIUM CHLORIDE, CALCIUM CHLORIDE 600; 310; 30; 20 MG/100ML; MG/100ML; MG/100ML; MG/100ML
INJECTION, SOLUTION INTRAVENOUS CONTINUOUS
Status: DISCONTINUED | OUTPATIENT
Start: 2019-01-04 | End: 2019-01-06 | Stop reason: HOSPADM

## 2019-01-04 RX ORDER — SODIUM CHLORIDE 9 MG/ML
INJECTION, SOLUTION INTRAVENOUS
Status: DISCONTINUED | OUTPATIENT
Start: 2019-01-04 | End: 2019-01-06 | Stop reason: HOSPADM

## 2019-01-04 RX ORDER — MAG HYDROX/ALUMINUM HYD/SIMETH 200-200-20
30 SUSPENSION, ORAL (FINAL DOSE FORM) ORAL EVERY 4 HOURS PRN
Status: DISCONTINUED | OUTPATIENT
Start: 2019-01-04 | End: 2019-01-06 | Stop reason: HOSPADM

## 2019-01-04 RX ORDER — ONDANSETRON 8 MG/1
8 TABLET, ORALLY DISINTEGRATING ORAL EVERY 8 HOURS PRN
Status: DISCONTINUED | OUTPATIENT
Start: 2019-01-04 | End: 2019-01-06 | Stop reason: HOSPADM

## 2019-01-04 RX ORDER — OXYTOCIN/RINGER'S LACTATE 20/1000 ML
333 PLASTIC BAG, INJECTION (ML) INTRAVENOUS CONTINUOUS
Status: ACTIVE | OUTPATIENT
Start: 2019-01-04 | End: 2019-01-04

## 2019-01-04 RX ADMIN — ALUMINUM HYDROXIDE, MAGNESIUM HYDROXIDE, AND SIMETHICONE 30 ML: 200; 200; 20 SUSPENSION ORAL at 10:01

## 2019-01-04 NOTE — HPI
33 y.o.  at 38w6d presents to hospital with complaints of contractions that started this morning.

## 2019-01-04 NOTE — H&P
[facility]  Obstetrics  History & Physical    Patient Name: Ebony Rodriguez  MRN: [id number]  Admission Date: 2019  Primary Care Provider: Primary Doctor No    Subjective:     Principal Problem:Normal labor    History of Present Illness:  33 y.o.  at 38w6d presents to hospital with complaints of contractions that started this morning.     Obstetric HPI:  Patient reports Date/time of onset: 1000 19 contractions, active fetal movement, No vaginal bleeding , No loss of fluid     This pregnancy has been complicated by   Incompetent cervix- cerclage  anemia    Obstetric History       T3      L5     SAB2   TAB0   Ectopic0   Multiple0   Live Births5       # Outcome Date GA Lbr Andres/2nd Weight Sex Delivery Anes PTL Lv   8 Current            7 Term 17 39w5d  3.035 kg (6 lb 11.1 oz) M Vag-Spont None N KOFI      Name: JAZLYN RODRIGUEZ      Apgar1:  9                Apgar5: 9   6 Term 14 38w0d   M Vag-Spont   KOFI      Complications: Cervical cerclage suture present   5 Term 13 38w0d   M Vag-Spont   KOFI      Complications: Cervical cerclage suture present   4 SAB 12 20w0d   F SAB   ND   3  10/31/09 36w0d   F Vag-Spont   KOFI      Complications: Cervical cerclage suture present   2  05 36w0d   F Vag-Spont   KOFI      Complications: Cervical cerclage suture present   1 SAB 00 20w0d   M    ND        Past Medical History:   Diagnosis Date    Anemia     H/O cervical cerclage, currently pregnant     Incompetent cervix during second trimester, antepartum 2018    Miscarriage      Past Surgical History:   Procedure Laterality Date    CERCLAGE, CERVIX N/A 2018    [facility] at [facility]    CERCLAGE-CERVICAL N/A 2016    [facility] by [facility] at [facility]    CERVICAL CERCLAGE         Facility-Administered Medications Prior to Admission   Medication    JUNIE Oil 250 mg     PTA Medications    Medication Sig    ferrous sulfate 325 (65 FE) MG EC tablet Take 1 tablet (325 mg total) by mouth 2 (two) times daily.    hydroxyprogest,PF,,preg presv, (JUNIE) 250 mg/mL (1 mL) Oil Inject 1 mL (250 mg total) into the muscle every 7 days.    PRENATAL VIT/IRON FUMARATE/FA (PRENATAL 1+1 ORAL) Take by mouth.       Review of patient's allergies indicates:  No Known Allergies     Family History     Problem Relation (Age of Onset)    Diabetes Mother    Heart failure Father        Tobacco Use    Smoking status: Never Smoker    Smokeless tobacco: Never Used   Substance and Sexual Activity    Alcohol use: No    Drug use: No    Sexual activity: Not Currently     Partners: Male     Review of Systems   Respiratory: Negative for shortness of breath.    Cardiovascular: Negative for chest pain.   Gastrointestinal: Positive for abdominal pain. Negative for nausea and vomiting.   Genitourinary: Negative for vaginal bleeding and vaginal discharge.   Neurological: Negative for headaches.   All other systems reviewed and are negative.     Objective:     Vital Signs (Most Recent):  Temp: 98.2 °F (36.8 °C) (01/04/19 1240)  Pulse: 96 (01/04/19 1530)  BP: 134/88 (01/04/19 1530) Vital Signs (24h Range):  Temp:  [98.2 °F (36.8 °C)] 98.2 °F (36.8 °C)  Pulse:  [87-96] 96  BP: (125-134)/(83-88) 134/88     Weight: 65.8 kg (145 lb)  Body mass index is 28.32 kg/m².    FHT: 135 Cat 1 (reassuring)  TOCO:  Q 3-10 minutes    Physical Exam:   Constitutional: She is oriented to person, place, and time. She appears well-developed and well-nourished.    HENT:   Head: Normocephalic.     Neck: Normal range of motion.    Cardiovascular: Normal rate and regular rhythm.     Pulmonary/Chest: Effort normal.        Abdominal: Soft.     Genitourinary: Vagina normal and uterus normal.           Musculoskeletal: Normal range of motion and moves all extremeties.       Neurological: She is alert and oriented to person, place, and time.    Skin: Skin is warm  and dry.        Cervix:  Dilation:  5  Effacement:  80  Station: -2  Presentation: Vertex     Significant Labs:  Lab Results   Component Value Date    GROUPTRH AB POS 2019    HEPBSAG Negative 2018    STREPBCULT No Group B Streptococcus isolated 2018       I have personallly reviewed all pertinent lab results from the last 24 hours.    Assessment/Plan:     33 y.o. female  at 38w6d for:    * Normal labor    Desires NCB. Anticipate          Gabriella Rich CNM  Obstetrics  [facility]

## 2019-01-04 NOTE — SUBJECTIVE & OBJECTIVE
Obstetric HPI:  Patient reports Date/time of onset: 1000 19 contractions, active fetal movement, No vaginal bleeding , No loss of fluid     This pregnancy has been complicated by   Incompetent cervix- cerclage  anemia    Obstetric History       T3      L5     SAB2   TAB0   Ectopic0   Multiple0   Live Births5       # Outcome Date GA Lbr Andres/2nd Weight Sex Delivery Anes PTL Lv   8 Current            7 Term 17 39w5d  3.035 kg (6 lb 11.1 oz) M Vag-Spont None N KOFI      Name: JAZLYN NICOLE      Apgar1:  9                Apgar5: 9   6 Term 14 38w0d   M Vag-Spont   KOFI      Complications: Cervical cerclage suture present   5 Term 13 38w0d   M Vag-Spont   KOFI      Complications: Cervical cerclage suture present   4 SAB 12 20w0d   F SAB   ND   3  10/31/09 36w0d   F Vag-Spont   KOFI      Complications: Cervical cerclage suture present   2  05 36w0d   F Vag-Spont   KOFI      Complications: Cervical cerclage suture present   1 SAB 00 20w0d   M    ND        Past Medical History:   Diagnosis Date    Anemia     H/O cervical cerclage, currently pregnant     Incompetent cervix during second trimester, antepartum 2018    Miscarriage      Past Surgical History:   Procedure Laterality Date    CERCLAGE, CERVIX N/A 2018    [facility]formed [facility] at [facility]    CERCLAGE-CERVICAL N/A 2016    Performed by [facility] at [facility]    CERVICAL CERCLAGE         Facility-Administered Medications Prior to Admission   Medication    JUNIE Oil 250 mg     PTA Medications   Medication Sig    ferrous sulfate 325 (65 FE) MG EC tablet Take 1 tablet (325 mg total) by mouth 2 (two) times daily.    hydroxyprogest,PF,,preg presv, (JUNIE) 250 mg/mL (1 mL) Oil Inject 1 mL (250 mg total) into the muscle every 7 days.    PRENATAL VIT/IRON FUMARATE/FA (PRENATAL 1+1 ORAL) Take by mouth.       Review of patient's allergies indicates:  No Known  Allergies     Family History     Problem Relation (Age of Onset)    Diabetes Mother    Heart failure Father        Tobacco Use    Smoking status: Never Smoker    Smokeless tobacco: Never Used   Substance and Sexual Activity    Alcohol use: No    Drug use: No    Sexual activity: Not Currently     Partners: Male     Review of Systems   Respiratory: Negative for shortness of breath.    Cardiovascular: Negative for chest pain.   Gastrointestinal: Positive for abdominal pain. Negative for nausea and vomiting.   Genitourinary: Negative for vaginal bleeding and vaginal discharge.   Neurological: Negative for headaches.   All other systems reviewed and are negative.     Objective:     Vital Signs (Most Recent):  Temp: 98.2 °F (36.8 °C) (01/04/19 1240)  Pulse: 96 (01/04/19 1530)  BP: 134/88 (01/04/19 1530) Vital Signs (24h Range):  Temp:  [98.2 °F (36.8 °C)] 98.2 °F (36.8 °C)  Pulse:  [87-96] 96  BP: (125-134)/(83-88) 134/88     Weight: 65.8 kg (145 lb)  Body mass index is 28.32 kg/m².    FHT: 135 Cat 1 (reassuring)  TOCO:  Q 3-10 minutes    Physical Exam:   Constitutional: She is oriented to person, place, and time. She appears well-developed and well-nourished.    HENT:   Head: Normocephalic.     Neck: Normal range of motion.    Cardiovascular: Normal rate and regular rhythm.     Pulmonary/Chest: Effort normal.        Abdominal: Soft.     Genitourinary: Vagina normal and uterus normal.           Musculoskeletal: Normal range of motion and moves all extremeties.       Neurological: She is alert and oriented to person, place, and time.    Skin: Skin is warm and dry.        Cervix:  Dilation:  5  Effacement:  80  Station: -2  Presentation: Vertex     Significant Labs:  Lab Results   Component Value Date    GROUPTRH AB POS 01/04/2019    HEPBSAG Negative 05/02/2018    STREPBCULT No Group B Streptococcus isolated 12/27/2018       I have personallly reviewed all pertinent lab results from the last 24 hours.

## 2019-01-05 PROCEDURE — 59409 OBSTETRICAL CARE: CPT | Mod: GB,,, | Performed by: ADVANCED PRACTICE MIDWIFE

## 2019-01-05 PROCEDURE — 59409 PR OBSTETRICAL CARE,VAG DELIV ONLY: ICD-10-PCS | Mod: GB,,, | Performed by: ADVANCED PRACTICE MIDWIFE

## 2019-01-05 PROCEDURE — 11000001 HC ACUTE MED/SURG PRIVATE ROOM

## 2019-01-05 PROCEDURE — 72200004 HC VAGINAL DELIVERY LEVEL I

## 2019-01-05 PROCEDURE — 63600175 PHARM REV CODE 636 W HCPCS: Performed by: ADVANCED PRACTICE MIDWIFE

## 2019-01-05 RX ORDER — ACETAMINOPHEN 325 MG/1
650 TABLET ORAL EVERY 6 HOURS PRN
Status: DISCONTINUED | OUTPATIENT
Start: 2019-01-05 | End: 2019-01-06 | Stop reason: HOSPADM

## 2019-01-05 RX ORDER — OXYTOCIN/RINGER'S LACTATE 20/1000 ML
41.65 PLASTIC BAG, INJECTION (ML) INTRAVENOUS CONTINUOUS
Status: ACTIVE | OUTPATIENT
Start: 2019-01-05 | End: 2019-01-05

## 2019-01-05 RX ORDER — HYDROCORTISONE 25 MG/G
CREAM TOPICAL 3 TIMES DAILY PRN
Status: DISCONTINUED | OUTPATIENT
Start: 2019-01-05 | End: 2019-01-06 | Stop reason: HOSPADM

## 2019-01-05 RX ORDER — DOCUSATE SODIUM 100 MG/1
200 CAPSULE, LIQUID FILLED ORAL 2 TIMES DAILY PRN
Status: DISCONTINUED | OUTPATIENT
Start: 2019-01-05 | End: 2019-01-06 | Stop reason: HOSPADM

## 2019-01-05 RX ORDER — DIPHENHYDRAMINE HCL 25 MG
25 CAPSULE ORAL EVERY 4 HOURS PRN
Status: DISCONTINUED | OUTPATIENT
Start: 2019-01-05 | End: 2019-01-06 | Stop reason: HOSPADM

## 2019-01-05 RX ORDER — ONDANSETRON 8 MG/1
8 TABLET, ORALLY DISINTEGRATING ORAL EVERY 8 HOURS PRN
Status: CANCELLED | OUTPATIENT
Start: 2019-01-05

## 2019-01-05 RX ORDER — DIPHENHYDRAMINE HYDROCHLORIDE 50 MG/ML
25 INJECTION INTRAMUSCULAR; INTRAVENOUS EVERY 4 HOURS PRN
Status: DISCONTINUED | OUTPATIENT
Start: 2019-01-05 | End: 2019-01-05

## 2019-01-05 RX ORDER — IBUPROFEN 600 MG/1
600 TABLET ORAL EVERY 6 HOURS
Status: DISCONTINUED | OUTPATIENT
Start: 2019-01-05 | End: 2019-01-06 | Stop reason: HOSPADM

## 2019-01-05 RX ORDER — HYDROCODONE BITARTRATE AND ACETAMINOPHEN 5; 325 MG/1; MG/1
1 TABLET ORAL EVERY 4 HOURS PRN
Status: DISCONTINUED | OUTPATIENT
Start: 2019-01-05 | End: 2019-01-06 | Stop reason: HOSPADM

## 2019-01-05 RX ORDER — HYDROCODONE BITARTRATE AND ACETAMINOPHEN 7.5; 325 MG/1; MG/1
1 TABLET ORAL EVERY 4 HOURS PRN
Status: DISCONTINUED | OUTPATIENT
Start: 2019-01-05 | End: 2019-01-06 | Stop reason: HOSPADM

## 2019-01-05 RX ADMIN — Medication 333 MILLI-UNITS/MIN: at 12:01

## 2019-01-05 NOTE — PROGRESS NOTES
"Per transition nurse, pt stated she does not desire to continue breastfeeding. She just wanted to "try once after delivery."  Pt states she does not wish to breastfeed and desires to formula feed exclusively.  "

## 2019-01-05 NOTE — L&D DELIVERY NOTE
[facility] -   Vaginal Delivery   Operative Note    SUMMARY     Normal spontaneous vaginal delivery of live infant, was placed on mothers abdomen for skin to skin and bulb suctioning performed.  Infant delivered position OA over intact perineum.  Nuchal cord: No.    Spontaneous delivery of placenta and IV pitocin given noting good uterine tone.  No lacerations noted.  Patient tolerated delivery well. Sponge needle and lap counted correctly x2.    Indications: Normal labor  Pregnancy complicated by:   Patient Active Problem List   Diagnosis    Incompetence of cervix    Contraceptive management    History of cerclage, currently pregnant    placement of cervical cerclage, 2nd trimester    Anemia    Normal labor     (normal spontaneous vaginal delivery)    Single liveborn     Admitting GA: 39w0d    Delivery Information for  Vladimir Rodriguez    Birth information:  YOB: 2019   Time of birth: 12:49 AM   Sex: male   Head Delivery Date/Time: 2019 12:49 AM   Delivery type: Vaginal, Spontaneous   Gestational Age: 39w0d    Delivery Providers    Delivering clinician:  Gabriella Rich CNM   Provider Role    NIRMALA Powell, [facility]             Measurements    Weight:    Length:           Apgars    Living status:    Apgars:   1 min.:   5 min.:   10 min.:   15 min.:   20 min.:     Skin color:          Heart rate:          Reflex irritability:          Muscle tone:          Respiratory effort:          Total:                 Operative Delivery    Forceps attempted?:  No  Vacuum extractor attempted?:  No         Shoulder Dystocia    Shoulder dystocia present?:  No           Presentation    Presentation:  Vertex  Position:  Left Occiput Anterior           Interventions/Resuscitation    Method:  Bulb Suctioning, Tactile Stimulation       Cord    Vessels:  3 vessels  Complications:  None  Delayed Cord Clamping?:  Yes  Cord Clamped Date/Time:  2019 12:51  AM  Cord Blood Disposition:  Lab  Gases Sent?:  No  Stem Cell Collection (by MD):  No       Placenta    Placenta delivery date/time:  2019 0055  Placenta removal:  Spontaneous  Placenta appearance:  Intact  Placenta disposition:  discarded           Labor Events:       labor: No     Labor Onset Date/Time:         Dilation Complete Date/Time:         Start Pushing Date/Time:       Rupture Date/Time:              Rupture type:           Fluid Amount:        Fluid Color:        Fluid Odor:        Membrane Status (PeriCalm): ARM (Artificial Rupture)      Rupture Date/Time (PeriCalm): 2019 22:04:00      Fluid Amount (PeriCalm): Moderate      Fluid Color (PeriCalm): Bloody       steroids:       Antibiotics given for GBS: No     Induction: none     Indications for induction:        Augmentation: amniotomy     Indications for augmentation:       Labor complications: None     Additional complications:          Cervical ripening:                     Delivery:      Episiotomy: None     Indication for Episiotomy:       Perineal Lacerations: None Repaired:      Periurethral Laceration: none Repaired:     Labial Laceration: none Repaired:     Sulcus Laceration: none Repaired:     Vaginal Laceration: No Repaired:     Cervical Laceration: No Repaired:     Repair suture: None     Repair # of packets:       Vaginal delivery QBL (mL): 50      QBL (mL): 0     Combined Blood Loss (mL): 50     Vaginal Sweep Performed: No     Surgicount Correct: No       Other providers:       Anesthesia    Method:  None          Details (if applicable):  Trial of Labor      Categorization:      Priority:     Indications for :     Incision Type:       Additional  information:  Forceps:    Vacuum:    Breech:    Observed anomalies    Other (Comments):

## 2019-01-06 VITALS
SYSTOLIC BLOOD PRESSURE: 116 MMHG | DIASTOLIC BLOOD PRESSURE: 77 MMHG | TEMPERATURE: 98 F | WEIGHT: 145 LBS | HEIGHT: 60 IN | RESPIRATION RATE: 18 BRPM | BODY MASS INDEX: 28.47 KG/M2 | HEART RATE: 80 BPM

## 2019-01-06 PROCEDURE — 99238 PR HOSPITAL DISCHARGE DAY,<30 MIN: ICD-10-PCS | Mod: ,,, | Performed by: ADVANCED PRACTICE MIDWIFE

## 2019-01-06 PROCEDURE — 99238 HOSP IP/OBS DSCHRG MGMT 30/<: CPT | Mod: ,,, | Performed by: ADVANCED PRACTICE MIDWIFE

## 2019-01-06 RX ORDER — IBUPROFEN 600 MG/1
600 TABLET ORAL EVERY 6 HOURS
Qty: 60 TABLET | Refills: 1 | Status: SHIPPED | OUTPATIENT
Start: 2019-01-06 | End: 2019-07-12 | Stop reason: SDUPTHER

## 2019-01-06 NOTE — DISCHARGE INSTRUCTIONS

## 2019-01-06 NOTE — NURSING
VSS, bleeding scant, fundus firm without massage. Discharge instructions reviewed with pt and she verbalizes understanding. Appears to be bonding well with infant, formula feeding infant. Follow up appt made for pt and she verbalizes understanding of time date and place of appt. AVS handout given. Discharged to car via wheelchair with infant in arms by staff.

## 2019-01-06 NOTE — DISCHARGE SUMMARY
[facility] -   Obstetrics  Discharge Summary      Patient Name: Ebony Rodriguez  MRN: [id number]  Admission Date: 2019  Hospital Length of Stay: 2 days  Discharge Date and Time:  2019 8:07 AM  Attending Physician: Rosie Galvan MD   Discharging Provider: Maty Walden CNM  Primary Care Provider: Primary Doctor No    HPI: 33 y.o.  at 38w6d presents to hospital with complaints of contractions that started this morning.     * No surgery found *     Hospital Course:   Admit. Anticipate   2019 Routine pp care. D/c home        Final Active Diagnoses:    Diagnosis Date Noted POA    PRINCIPAL PROBLEM:   (normal spontaneous vaginal delivery) [O80] 2019 Not Applicable    Single liveborn [Z38.2] 2019 No    Normal labor [O80, Z37.9] 2019 Not Applicable      Problems Resolved During this Admission:          Feeding Method: both breast and bottle    Immunizations     Date Immunization Status Dose Route/Site Given by    19 MMR Incomplete 0.5 mL Subcutaneous/Left deltoid     19 Tdap Incomplete 0.5 mL Intramuscular/Left deltoid           Delivery:    Episiotomy: None   Lacerations: None   Repair suture: None   Repair # of packets:     Blood loss (ml): 50     Birth information:  YOB: 2019   Time of birth: 12:49 AM   Sex: male   Delivery type: Vaginal, Spontaneous   Gestational Age: 39w0d    Delivery Clinician:      Other providers:       Additional  information:  Forceps:    Vacuum:    Breech:    Observed anomalies      Living?:           APGARS  One minute Five minutes Ten minutes   Skin color:         Heart rate:         Grimace:         Muscle tone:         Breathing:         Totals: 9  9        Placenta: Delivered:       appearance    Pending Diagnostic Studies:     None          Discharged Condition: good    Disposition: Home or Self Care    Follow Up:  Follow-up Information     Tere Zheng CNM.    Specialty:   Obstetrics and Gynecology  Contact information:  [address] [place] [address] [address] [id number]  [phone]                 Patient Instructions:      Notify your health care provider if you experience any of the following:  temperature >100.4     Notify your health care provider if you experience any of the following:  severe uncontrolled pain     Medications:  Current Discharge Medication List      START taking these medications    Details   ibuprofen (ADVIL,MOTRIN) 600 MG tablet Take 1 tablet (600 mg total) by mouth every 6 (six) hours.  Qty: 60 tablet, Refills: 1         CONTINUE these medications which have NOT CHANGED    Details   ferrous sulfate 325 (65 FE) MG EC tablet Take 1 tablet (325 mg total) by mouth 2 (two) times daily.  Qty: 60 tablet, Refills: 3      hydroxyprogest,PF,,preg presv, (JUNIE) 250 mg/mL (1 mL) Oil Inject 1 mL (250 mg total) into the muscle every 7 days.  Qty: 5 mL, Refills: 4    Associated Diagnoses: History of cerclage, currently pregnant; Incompetence of cervix      PRENATAL VIT/IRON FUMARATE/FA (PRENATAL 1+1 ORAL) Take by mouth.             Maty Walden CNM  Obstetrics  [facility]

## 2019-01-06 NOTE — PLAN OF CARE
Problem: Adult Inpatient Plan of Care  Goal: Plan of Care Review  Outcome: Ongoing (interventions implemented as appropriate)  The patient is progressing appropriately. Vitals have remained stable, ambulating without difficulty, and voiding spontaneously. Bleeding is scant, with fundus firm @ -2. Patient has been refusing scheduled Ibuprofen due to her not having any pain. Patient is formula feeding. Appropriate bonding is occurring with infant. Will continue to monitor. The nurse encouraged the patient to call for assistance as needed.

## 2019-02-14 ENCOUNTER — POSTPARTUM VISIT (OUTPATIENT)
Dept: OBSTETRICS AND GYNECOLOGY | Facility: CLINIC | Age: 34
End: 2019-02-14
Payer: MEDICAID

## 2019-02-14 VITALS — HEIGHT: 60 IN | SYSTOLIC BLOOD PRESSURE: 110 MMHG | BODY MASS INDEX: 28.32 KG/M2 | DIASTOLIC BLOOD PRESSURE: 80 MMHG

## 2019-02-14 DIAGNOSIS — Z97.5 CONTRACEPTION, DEVICE INTRAUTERINE: Primary | ICD-10-CM

## 2019-02-14 PROBLEM — Z37.9 NORMAL LABOR: Status: RESOLVED | Noted: 2019-01-04 | Resolved: 2019-02-14

## 2019-02-14 PROBLEM — O26.872 SHORT CERVIX WITH CERVICAL CERCLAGE, ANTEPARTUM, SECOND TRIMESTER: Status: RESOLVED | Noted: 2018-07-11 | Resolved: 2019-02-14

## 2019-02-14 PROBLEM — O09.299 HISTORY OF CERCLAGE, CURRENTLY PREGNANT: Status: RESOLVED | Noted: 2018-05-02 | Resolved: 2019-02-14

## 2019-02-14 PROBLEM — Z98.890 HISTORY OF CERCLAGE, CURRENTLY PREGNANT: Status: RESOLVED | Noted: 2018-05-02 | Resolved: 2019-02-14

## 2019-02-14 PROBLEM — O34.32 SHORT CERVIX WITH CERVICAL CERCLAGE, ANTEPARTUM, SECOND TRIMESTER: Status: RESOLVED | Noted: 2018-07-11 | Resolved: 2019-02-14

## 2019-02-14 PROCEDURE — 59430 PR CARE AFTER DELIVERY ONLY: ICD-10-PCS | Mod: S$PBB,,, | Performed by: ADVANCED PRACTICE MIDWIFE

## 2019-02-14 PROCEDURE — 99212 OFFICE O/P EST SF 10 MIN: CPT | Mod: PBBFAC,PN | Performed by: ADVANCED PRACTICE MIDWIFE

## 2019-02-14 PROCEDURE — 99999 PR PBB SHADOW E&M-EST. PATIENT-LVL II: CPT | Mod: PBBFAC,,, | Performed by: ADVANCED PRACTICE MIDWIFE

## 2019-02-14 PROCEDURE — 99999 PR PBB SHADOW E&M-EST. PATIENT-LVL II: ICD-10-PCS | Mod: PBBFAC,,, | Performed by: ADVANCED PRACTICE MIDWIFE

## 2019-02-14 NOTE — PROGRESS NOTES
34 y.o. female for postpartum visit.  Patient has no complaints.  Her delivery records were reviewed.  She is bottle feeding infant.    Exam  General - well appearing, no apparent distress  Abdomen - soft, non tender, non distended incision none.  Pelvic - normal external genitalia, any lacerations well healed               uterus non tender, appropriately sized  Extremeties - no edema    Assessment:  Encounter Diagnosis   Name Primary?    Contraception, device intrauterine Yes        F/u - return for annual exam when due  Desires Kyleena. Ordered today. Will call when ready.

## 2019-02-20 ENCOUNTER — TELEPHONE (OUTPATIENT)
Dept: OBSTETRICS AND GYNECOLOGY | Facility: CLINIC | Age: 34
End: 2019-02-20

## 2019-02-20 ENCOUNTER — TELEPHONE (OUTPATIENT)
Dept: PHARMACY | Facility: CLINIC | Age: 34
End: 2019-02-20

## 2019-02-20 DIAGNOSIS — Z97.5 CONTRACEPTION, DEVICE INTRAUTERINE: Primary | ICD-10-CM

## 2019-02-20 NOTE — TELEPHONE ENCOUNTER
Good Afternoon,     [facility] received a prescription for KYLEENA. Upon calling the patient's insurance company, we have been told that KYLEENA is excluded under the patient's pharmacy benefits. [facility] is unable to bill medical claims for medications.     The medication itself, and the administration of the medication, will both have to be billed under the medical benefit and may require a prior authorization. Please contact [facility] with any questions at [phone].     Thank you,                                             Alla Jenkins [facility]                                           Patient Care Advocate                                           [facility]     *Please note. [facility] [place] of [place] does cover MIRENA, LILETTA and ISIDRO under the Rx benefit  without authorization.

## 2019-02-20 NOTE — TELEPHONE ENCOUNTER
----- Message from Alla Jenkins sent at 2/20/2019  3:02 PM CST -----  Regarding: KYLEENA. Non-Preferred  Good Afternoon,                [facility] received a prescription for KYLEENA. Upon calling the patient's insurance company, we have been told that KYLEENA is excluded under the patient's pharmacy benefits. [facility] is unable to bill medical claims for medications.                The medication itself, and the administration of the medication, will both have to be billed under the medical benefit and may require a prior authorization. Please contact [facility] with any questions at [phone].                Thank you,                                             Alla Jenkins [facility]                                           Patient Care Advocate                                           [facility]     #Please note. [facility] Plan of [place] does cover MIRENA, LILETTA and ISIDRO under the Rx benefit  without authorization.

## 2019-02-21 ENCOUNTER — TELEPHONE (OUTPATIENT)
Dept: OBSTETRICS AND GYNECOLOGY | Facility: CLINIC | Age: 34
End: 2019-02-21

## 2019-02-21 NOTE — TELEPHONE ENCOUNTER
Spoke to patient and let her know that she did not have to come to the appointment because it is just for the referral. Pt. Verbalized understanding.

## 2019-02-21 NOTE — TELEPHONE ENCOUNTER
----- Message from Maite Jarquin sent at 2/21/2019  8:34 AM CST -----  Contact: self   Requesting a call back to reschedule appointment for anytime besides 3:30 pm. Please call back at [phone].      Thanks,  Maite Jarquin

## 2019-02-26 ENCOUNTER — TELEPHONE (OUTPATIENT)
Dept: OBSTETRICS AND GYNECOLOGY | Facility: CLINIC | Age: 34
End: 2019-02-26

## 2019-02-26 DIAGNOSIS — Z30.014 ENCOUNTER FOR INITIAL PRESCRIPTION OF INTRAUTERINE CONTRACEPTIVE DEVICE (IUD): Primary | ICD-10-CM

## 2019-02-26 NOTE — TELEPHONE ENCOUNTER
Routed to FLOR Rich CNM for approval. diana arrington      ----- Message from Gabriella Rich CNM sent at 2/26/2019 10:14 AM CST -----  Regarding: FW: KYLEENA. Non-Preferred  Hey Chhaya,   Can you take care of this for me?  Arsenio  ----- Message -----  From: Alla Jenkins  Sent: 2/20/2019   3:02 PM  To: Gabriella Rich CNM, #  Subject: KYLEENA. Non-Preferred                           Good Afternoon,                [facility] received a prescription for KYLEENA. Upon calling the patient's insurance company, we have been told that KYLEENA is excluded under the patient's pharmacy benefits. [facility] is unable to bill medical claims for medications.                The medication itself, and the administration of the medication, will both have to be billed under the medical benefit and may require a prior authorization. Please contact [facility] with any questions at [phone].                Thank you,                                             Alla Jenkins [facility]                                           Patient Care Advocate                                           [facility]     #Please note. [facility] [place] of [place] does cover MIRENA, LILETTA and ISIDRO under the Rx benefit  without authorization.

## 2019-03-13 ENCOUNTER — TELEPHONE (OUTPATIENT)
Dept: OBSTETRICS AND GYNECOLOGY | Facility: CLINIC | Age: 34
End: 2019-03-13

## 2019-03-13 NOTE — TELEPHONE ENCOUNTER
Returned pt call. Pt wanted to know if she needed to come in to her nurse visit. Advised pt that we only had to schedule the visit so we could link the referral to it for her IUD. Pt verbalized understanding.

## 2019-04-09 NOTE — TELEPHONE ENCOUNTER
----- Message from Alla Jenkins sent at 2/20/2019  3:02 PM CST -----  Regarding: KYLEENA. Non-Preferred  Good Afternoon,                [facility] received a prescription for KYLEENA. Upon calling the patient's insurance company, we have been told that KYLEENA is excluded under the patient's pharmacy benefits. [facility] is unable to bill medical claims for medications.                The medication itself, and the administration of the medication, will both have to be billed under the medical benefit and may require a prior authorization. Please contact [facility] with any questions at [phone].                Thank you,                                             Alla Jenkins [facility]                                           Patient Care Advocate                                           [facility]     #Please note. [facility] [place] of [place] does cover MIRENA, LILETTA and ISIDRO under the Rx benefit  without authorization.   
none

## 2019-07-10 ENCOUNTER — TELEPHONE (OUTPATIENT)
Dept: OBSTETRICS AND GYNECOLOGY | Facility: CLINIC | Age: 34
End: 2019-07-10

## 2019-07-10 NOTE — TELEPHONE ENCOUNTER
Spoke with pt, advised the device is getting shipped over from our [place] location. Advised we will call her when it arrives to schedule insertion. Pt verbalized understanding.

## 2019-07-10 NOTE — TELEPHONE ENCOUNTER
Returned call to patient.  She says that she didn't leave a message to schedule an appt, she called about her birth control device, per patient.  She says that she just left the clinic and was told the device hasn't come in yet, per patient.  She further mentioned that the device was ordered in February 2019, and is unsure why it hasn't come in yet.  She asked that a message be forwarded to staff to follow-up on the device because she needs it, per patient.  Made her aware that I will forward a message to staff regarding the Kyleena, she verbalized understanding.  She then requested to schedule a wwe.  Appt scheduled 07/25/19 at 3:15 pm, she confirmed appt, provider and location.

## 2019-07-10 NOTE — TELEPHONE ENCOUNTER
----- Message from Lea Hopson sent at 7/10/2019  3:10 PM CDT -----  Contact: Pt  Please give pt a call at .[phone] (home) she is calling to schedule a GYN appt

## 2019-07-11 ENCOUNTER — TELEPHONE (OUTPATIENT)
Dept: OBSTETRICS AND GYNECOLOGY | Facility: CLINIC | Age: 34
End: 2019-07-11

## 2019-07-12 ENCOUNTER — PROCEDURE VISIT (OUTPATIENT)
Dept: OBSTETRICS AND GYNECOLOGY | Facility: CLINIC | Age: 34
End: 2019-07-12
Payer: MEDICAID

## 2019-07-12 ENCOUNTER — TELEPHONE (OUTPATIENT)
Dept: OBSTETRICS AND GYNECOLOGY | Facility: CLINIC | Age: 34
End: 2019-07-12

## 2019-07-12 VITALS
HEIGHT: 60 IN | BODY MASS INDEX: 24.71 KG/M2 | SYSTOLIC BLOOD PRESSURE: 104 MMHG | DIASTOLIC BLOOD PRESSURE: 74 MMHG | WEIGHT: 125.88 LBS

## 2019-07-12 DIAGNOSIS — Z30.430 ENCOUNTER FOR INSERTION OF INTRAUTERINE CONTRACEPTIVE DEVICE (IUD): Primary | ICD-10-CM

## 2019-07-12 PROCEDURE — 58300 INSERT INTRAUTERINE DEVICE: CPT | Mod: PBBFAC,PN | Performed by: OBSTETRICS & GYNECOLOGY

## 2019-07-12 PROCEDURE — 58300 INSERTION OF IUD: ICD-10-PCS | Mod: S$PBB,,, | Performed by: OBSTETRICS & GYNECOLOGY

## 2019-07-12 RX ORDER — IBUPROFEN 600 MG/1
600 TABLET ORAL EVERY 6 HOURS
Qty: 30 TABLET | Refills: 0 | Status: SHIPPED | OUTPATIENT
Start: 2019-07-12 | End: 2019-07-22

## 2019-07-12 RX ADMIN — LEVONORGESTREL 17.5 MCG: 19.5 INTRAUTERINE DEVICE INTRAUTERINE at 02:07

## 2019-07-12 NOTE — TELEPHONE ENCOUNTER
Spoke to patient and she says she no longer wants to cancel her appointment. She is currently on her way there. Call ended.

## 2019-07-12 NOTE — TELEPHONE ENCOUNTER
----- Message from Juan Cuevas sent at 7/12/2019 12:48 PM CDT -----  Contact: pT   Pt is calling is calling to cancel appt due to inclement weather. Pt would also like for nurse to call pt back to reschedule appt. .[phone] (home)           .Thank You  Valerie Cuevas

## 2019-07-12 NOTE — PROCEDURES
Insertion of IUD  Date/Time: 7/12/2019 2:05 PM  Performed by: Keila Lema MD  Authorized by: Keila Lema MD     Consent:     Consent obtained:  Written    Consent given by:  Patient    Procedure risks and benefits discussed: yes      Patient questions answered: yes      Patient agrees, verbalizes understanding, and wants to proceed: yes      Educational handouts given: yes      Instructions and paperwork completed: yes    Procedure:     Negative urine pregnancy test: declined as patient on menses.      Cervix cleaned and prepped: yes      Speculum placed in vagina: yes      Tenaculum applied to cervix: yes      Uterus sounded: yes      Uterus sound depth (cm):  6.5    IUD inserted with no complications: yes      Strings trimmed: yes    Post-procedure:     Patient tolerated procedure well: yes      Patient will follow up after next period: yes

## 2019-07-25 ENCOUNTER — OFFICE VISIT (OUTPATIENT)
Dept: OBSTETRICS AND GYNECOLOGY | Facility: CLINIC | Age: 34
End: 2019-07-25
Payer: MEDICAID

## 2019-07-25 VITALS
BODY MASS INDEX: 24.97 KG/M2 | DIASTOLIC BLOOD PRESSURE: 72 MMHG | SYSTOLIC BLOOD PRESSURE: 100 MMHG | WEIGHT: 127.88 LBS

## 2019-07-25 DIAGNOSIS — Z30.431 ENCOUNTER FOR ROUTINE CHECKING OF INTRAUTERINE CONTRACEPTIVE DEVICE (IUD): ICD-10-CM

## 2019-07-25 DIAGNOSIS — Z01.419 ENCOUNTER FOR GYNECOLOGICAL EXAMINATION (GENERAL) (ROUTINE) WITHOUT ABNORMAL FINDINGS: Primary | ICD-10-CM

## 2019-07-25 DIAGNOSIS — Z12.4 SCREENING FOR CERVICAL CANCER: ICD-10-CM

## 2019-07-25 PROCEDURE — 99395 PR PREVENTIVE VISIT,EST,18-39: ICD-10-PCS | Mod: S$PBB,,, | Performed by: OBSTETRICS & GYNECOLOGY

## 2019-07-25 PROCEDURE — 99999 PR PBB SHADOW E&M-EST. PATIENT-LVL III: ICD-10-PCS | Mod: PBBFAC,,, | Performed by: OBSTETRICS & GYNECOLOGY

## 2019-07-25 PROCEDURE — 99213 OFFICE O/P EST LOW 20 MIN: CPT | Mod: PBBFAC,PN | Performed by: OBSTETRICS & GYNECOLOGY

## 2019-07-25 PROCEDURE — 88175 CYTOPATH C/V AUTO FLUID REDO: CPT | Performed by: PATHOLOGY

## 2019-07-25 PROCEDURE — 88141 LIQUID-BASED PAP SMEAR, SCREENING: ICD-10-PCS | Mod: ,,, | Performed by: PATHOLOGY

## 2019-07-25 PROCEDURE — 99999 PR PBB SHADOW E&M-EST. PATIENT-LVL III: CPT | Mod: PBBFAC,,, | Performed by: OBSTETRICS & GYNECOLOGY

## 2019-07-25 PROCEDURE — 88141 CYTOPATH C/V INTERPRET: CPT | Mod: ,,, | Performed by: PATHOLOGY

## 2019-07-25 PROCEDURE — 87624 HPV HI-RISK TYP POOLED RSLT: CPT

## 2019-07-25 PROCEDURE — 99395 PREV VISIT EST AGE 18-39: CPT | Mod: S$PBB,,, | Performed by: OBSTETRICS & GYNECOLOGY

## 2019-07-25 NOTE — PROGRESS NOTES
Subjective:       Patient ID: Ebony Rodriguez is a 34 y.o. female.    Chief Complaint:  Well Woman      History of Present Illness  HPI  Annual Exam-Premenopausal  Patient presents for annual exam. The patient has no complaints today. The patient is sexually active--, iud for contraception; denies pelvic pain with intercourse. GYN screening history: last pap: was normal and patient does not recall when last pap was. The patient wears seatbelts: yes. The patient participates in regular exercise: no. Has the patient ever been transfused or tattooed?: no. The patient reports that there is not domestic violence in her life.    Reports irreg menses since iud insertion; denies dysmenorrhea            GYN & OB History  Patient's last menstrual period was 07/10/2019 (exact date).   Date of Last Pap: 2017    OB History    Para Term  AB Living   8 6 4 2 2 6   SAB TAB Ectopic Multiple Live Births   2 0 0 0 6      # Outcome Date GA Lbr Andres/2nd Weight Sex Delivery Anes PTL Lv   8 Term 19 39w0d / 00:19 2.693 kg (5 lb 15 oz) M Vag-Spont None N KOFI   7 Term 17 39w5d  3.035 kg (6 lb 11.1 oz) M Vag-Spont None N KOFI   6 Term 14 38w0d   M Vag-Spont   KOFI      Complications: Cervical cerclage suture present   5 Term 13 38w0d   M Vag-Spont   KOFI      Complications: Cervical cerclage suture present   4 SAB 12 20w0d   F SAB   ND   3  10/31/09 36w0d   F Vag-Spont   KOFI      Complications: Cervical cerclage suture present   2  05 36w0d   F Vag-Spont   KOFI      Complications: Cervical cerclage suture present   1 SAB 00 20w0d   M    ND       Review of Systems  Review of Systems   Genitourinary: Positive for menstrual problem.   All other systems reviewed and are negative.          Objective:      Physical Exam:   Constitutional: She appears well-developed.     Eyes: Pupils are equal, round, and reactive to light. Conjunctivae and EOM are normal.    Neck:  Normal range of motion. Neck supple.     Pulmonary/Chest: Effort normal. Right breast exhibits no mass, no nipple discharge, no skin change and no tenderness. Left breast exhibits no mass, no nipple discharge, no skin change and no tenderness. Breasts are symmetrical.        Abdominal: Soft.     Genitourinary: Rectum normal and uterus normal. Pelvic exam was performed with patient supine. Cervix is normal. Right adnexum displays no mass and no tenderness. Left adnexum displays no mass and no tenderness. No erythema, bleeding, rectocele, cystocele or unspecified prolapse of vaginal walls in the vagina. Vaginal discharge (menstrual like blood in vagina) found. Labial bartholins normal.Cervix exhibits no friability. Additional cervical findings: IUD strings visualized and pap smear done       Uterus Size: 6 cm   Musculoskeletal: Normal range of motion.       Neurological: She is alert.    Skin: Skin is warm.    Psychiatric: She has a normal mood and affect.           Assessment:        1. Encounter for gynecological examination (general) (routine) without abnormal findings    2. Screening for cervical cancer    3. Encounter for routine checking of intrauterine contraceptive device (IUD)               Plan:      Continue annual well woman exam.  Pap today; Reviewed updated recommendations for pap smears (every 3 years) in low risk patients.   Recommend annual pelvic exams.  Reviewed recommendations for annual CBE.  Reviewed irreg bleeding on iud; reassurance given  Aware mammo due age 40  Continue diet, exercise, weight loss

## 2019-07-30 LAB
HPV HR 12 DNA CVX QL NAA+PROBE: NEGATIVE
HPV16 AG SPEC QL: NEGATIVE
HPV18 DNA SPEC QL NAA+PROBE: NEGATIVE

## 2019-08-07 ENCOUNTER — TELEPHONE (OUTPATIENT)
Dept: OBSTETRICS AND GYNECOLOGY | Facility: CLINIC | Age: 34
End: 2019-08-07

## 2019-08-30 RX ORDER — NORETHINDRONE ACETATE AND ETHINYL ESTRADIOL 1.5; 3 MG/1; UG/1
TABLET ORAL
Refills: 0 | OUTPATIENT
Start: 2019-08-30

## 2021-03-31 ENCOUNTER — PATIENT MESSAGE (OUTPATIENT)
Dept: OBSTETRICS AND GYNECOLOGY | Facility: CLINIC | Age: 36
End: 2021-03-31

## 2021-04-14 ENCOUNTER — TELEPHONE (OUTPATIENT)
Dept: OBSTETRICS AND GYNECOLOGY | Facility: CLINIC | Age: 36
End: 2021-04-14

## 2021-04-15 ENCOUNTER — PROCEDURE VISIT (OUTPATIENT)
Dept: OBSTETRICS AND GYNECOLOGY | Facility: CLINIC | Age: 36
End: 2021-04-15

## 2021-04-15 VITALS
DIASTOLIC BLOOD PRESSURE: 80 MMHG | BODY MASS INDEX: 27.07 KG/M2 | HEIGHT: 60 IN | SYSTOLIC BLOOD PRESSURE: 112 MMHG | WEIGHT: 137.88 LBS

## 2021-04-15 DIAGNOSIS — Z30.432 ENCOUNTER FOR REMOVAL OF INTRAUTERINE CONTRACEPTIVE DEVICE (IUD): Primary | ICD-10-CM

## 2021-04-15 PROBLEM — Z30.9 CONTRACEPTIVE MANAGEMENT: Status: RESOLVED | Noted: 2017-06-30 | Resolved: 2021-04-15

## 2021-04-15 PROBLEM — D64.9 ANEMIA: Status: RESOLVED | Noted: 2018-11-21 | Resolved: 2021-04-15

## 2021-04-15 PROCEDURE — 58301 REMOVAL OF IUD: ICD-10-PCS | Mod: S$PBB,,, | Performed by: OBSTETRICS & GYNECOLOGY

## 2021-04-15 PROCEDURE — 58301 REMOVE INTRAUTERINE DEVICE: CPT | Mod: PBBFAC,PN | Performed by: OBSTETRICS & GYNECOLOGY

## 2021-04-29 ENCOUNTER — PATIENT MESSAGE (OUTPATIENT)
Dept: RESEARCH | Facility: HOSPITAL | Age: 36
End: 2021-04-29

## 2023-01-05 ENCOUNTER — OFFICE VISIT (OUTPATIENT)
Dept: OBSTETRICS AND GYNECOLOGY | Facility: CLINIC | Age: 38
End: 2023-01-05
Payer: MEDICAID

## 2023-01-05 ENCOUNTER — LAB VISIT (OUTPATIENT)
Dept: LAB | Facility: HOSPITAL | Age: 38
End: 2023-01-05
Attending: NURSE PRACTITIONER
Payer: MEDICAID

## 2023-01-05 VITALS
SYSTOLIC BLOOD PRESSURE: 115 MMHG | BODY MASS INDEX: 25.08 KG/M2 | HEIGHT: 60 IN | WEIGHT: 127.75 LBS | DIASTOLIC BLOOD PRESSURE: 80 MMHG

## 2023-01-05 DIAGNOSIS — Z32.01 POSITIVE PREGNANCY TEST: ICD-10-CM

## 2023-01-05 DIAGNOSIS — O26.841 UTERINE SIZE DATE DISCREPANCY PREGNANCY, FIRST TRIMESTER: ICD-10-CM

## 2023-01-05 DIAGNOSIS — Z32.01 POSITIVE PREGNANCY TEST: Primary | ICD-10-CM

## 2023-01-05 LAB
B-HCG UR QL: POSITIVE
CTP QC/QA: YES

## 2023-01-05 PROCEDURE — 87086 URINE CULTURE/COLONY COUNT: CPT | Performed by: NURSE PRACTITIONER

## 2023-01-05 PROCEDURE — 81025 URINE PREGNANCY TEST: CPT | Mod: PBBFAC,PN | Performed by: NURSE PRACTITIONER

## 2023-01-05 PROCEDURE — 83036 HEMOGLOBIN GLYCOSYLATED A1C: CPT | Performed by: NURSE PRACTITIONER

## 2023-01-05 PROCEDURE — 99203 OFFICE O/P NEW LOW 30 MIN: CPT | Mod: S$PBB,TH,, | Performed by: NURSE PRACTITIONER

## 2023-01-05 PROCEDURE — 80074 ACUTE HEPATITIS PANEL: CPT | Performed by: NURSE PRACTITIONER

## 2023-01-05 PROCEDURE — 86592 SYPHILIS TEST NON-TREP QUAL: CPT | Performed by: NURSE PRACTITIONER

## 2023-01-05 PROCEDURE — 3074F SYST BP LT 130 MM HG: CPT | Mod: CPTII,,, | Performed by: NURSE PRACTITIONER

## 2023-01-05 PROCEDURE — 1159F MED LIST DOCD IN RCRD: CPT | Mod: CPTII,,, | Performed by: NURSE PRACTITIONER

## 2023-01-05 PROCEDURE — 87491 CHLMYD TRACH DNA AMP PROBE: CPT | Performed by: NURSE PRACTITIONER

## 2023-01-05 PROCEDURE — 3074F PR MOST RECENT SYSTOLIC BLOOD PRESSURE < 130 MM HG: ICD-10-PCS | Mod: CPTII,,, | Performed by: NURSE PRACTITIONER

## 2023-01-05 PROCEDURE — 3079F PR MOST RECENT DIASTOLIC BLOOD PRESSURE 80-89 MM HG: ICD-10-PCS | Mod: CPTII,,, | Performed by: NURSE PRACTITIONER

## 2023-01-05 PROCEDURE — 87077 CULTURE AEROBIC IDENTIFY: CPT | Performed by: NURSE PRACTITIONER

## 2023-01-05 PROCEDURE — 3008F PR BODY MASS INDEX (BMI) DOCUMENTED: ICD-10-PCS | Mod: CPTII,,, | Performed by: NURSE PRACTITIONER

## 2023-01-05 PROCEDURE — 86900 BLOOD TYPING SEROLOGIC ABO: CPT | Performed by: NURSE PRACTITIONER

## 2023-01-05 PROCEDURE — 3008F BODY MASS INDEX DOCD: CPT | Mod: CPTII,,, | Performed by: NURSE PRACTITIONER

## 2023-01-05 PROCEDURE — 99999 PR PBB SHADOW E&M-EST. PATIENT-LVL III: CPT | Mod: PBBFAC,,, | Performed by: NURSE PRACTITIONER

## 2023-01-05 PROCEDURE — 36415 COLL VENOUS BLD VENIPUNCTURE: CPT | Mod: PO | Performed by: NURSE PRACTITIONER

## 2023-01-05 PROCEDURE — 1160F PR REVIEW ALL MEDS BY PRESCRIBER/CLIN PHARMACIST DOCUMENTED: ICD-10-PCS | Mod: CPTII,,, | Performed by: NURSE PRACTITIONER

## 2023-01-05 PROCEDURE — 87389 HIV-1 AG W/HIV-1&-2 AB AG IA: CPT | Performed by: NURSE PRACTITIONER

## 2023-01-05 PROCEDURE — 99999 PR PBB SHADOW E&M-EST. PATIENT-LVL III: ICD-10-PCS | Mod: PBBFAC,,, | Performed by: NURSE PRACTITIONER

## 2023-01-05 PROCEDURE — 99213 OFFICE O/P EST LOW 20 MIN: CPT | Mod: PBBFAC,TH,PN | Performed by: NURSE PRACTITIONER

## 2023-01-05 PROCEDURE — 1159F PR MEDICATION LIST DOCUMENTED IN MEDICAL RECORD: ICD-10-PCS | Mod: CPTII,,, | Performed by: NURSE PRACTITIONER

## 2023-01-05 PROCEDURE — 87591 N.GONORRHOEAE DNA AMP PROB: CPT | Performed by: NURSE PRACTITIONER

## 2023-01-05 PROCEDURE — 3079F DIAST BP 80-89 MM HG: CPT | Mod: CPTII,,, | Performed by: NURSE PRACTITIONER

## 2023-01-05 PROCEDURE — 99203 PR OFFICE/OUTPT VISIT, NEW, LEVL III, 30-44 MIN: ICD-10-PCS | Mod: S$PBB,TH,, | Performed by: NURSE PRACTITIONER

## 2023-01-05 PROCEDURE — 87088 URINE BACTERIA CULTURE: CPT | Performed by: NURSE PRACTITIONER

## 2023-01-05 PROCEDURE — 85025 COMPLETE CBC W/AUTO DIFF WBC: CPT | Performed by: NURSE PRACTITIONER

## 2023-01-05 PROCEDURE — 82570 ASSAY OF URINE CREATININE: CPT | Performed by: NURSE PRACTITIONER

## 2023-01-05 PROCEDURE — 80053 COMPREHEN METABOLIC PANEL: CPT | Performed by: NURSE PRACTITIONER

## 2023-01-05 PROCEDURE — 1160F RVW MEDS BY RX/DR IN RCRD: CPT | Mod: CPTII,,, | Performed by: NURSE PRACTITIONER

## 2023-01-05 PROCEDURE — 86762 RUBELLA ANTIBODY: CPT | Performed by: NURSE PRACTITIONER

## 2023-01-05 PROCEDURE — 87186 SC STD MICRODIL/AGAR DIL: CPT | Performed by: NURSE PRACTITIONER

## 2023-01-05 NOTE — PROGRESS NOTES
Subjective:       Patient ID: Ebony Rodriguez is a 37 y.o. female.    Chief Complaint:  Possible Pregnancy      History of Present Illness  HPI  Ebony Rodriguez is a 37 y.o. , presents today for amenorrhea.      She has not seen any other provider for this pregnancy.  Patient's last menstrual period was 2022 (exact date).  Prior to LMP, menses were regular occuring every 25 days prior to this lasting 7 days.  She is not currently on any contraception. + UPT on 2022.  Pregnancy is unplanned, but desired.  Denies nausea, vomiting. Has noticed breast tenderness. Denies vaginal bleeding since LMP.    Hx of x2 cerclages; denies pre-e, PIH or GDM.    Hx of incompetent cervix; x2 cerclages.  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    SOCIAL HISTORY: Denies emotional/mental/physical/sexual violence or abuse. Feels safe at home; lives with her  and their children -- Jb Minaya.  5yo and 7yo son failure to thrive and ADHD.  Seeing peds GI.  Denies any genetic disorders.    PAP HISTORY: last pap 2019, results- NILM.  Denies any history of abnormal pap smear or STDs. Discussed ASCCP guidelines. Pap not done today/ collect postpartum.    GYN & OB History  Patient's last menstrual period was 2022 (exact date).   Date of Last Pap: No result found    OB History    Para Term  AB Living   8 6 4 2 2 6   SAB IAB Ectopic Multiple Live Births   2 0 0 0 6      # Outcome Date GA Lbr Andres/2nd Weight Sex Delivery Anes PTL Lv   8 Term 19 39w0d / 00:19 2.693 kg (5 lb 15 oz) M Vag-Spont None N KOFI   7 Term 17 39w5d  3.035 kg (6 lb 11.1 oz) M Vag-Spont None N KOFI   6 Term 14 38w0d   M Vag-Spont   KOFI      Complications: Cervical cerclage suture present   5 Term 13 38w0d   M Vag-Spont   KOFI      Complications: Cervical cerclage suture present   4 SAB 12 20w0d   F SAB   ND   3  10/31/09 36w0d   F Vag-Spont   KOFI      Complications: Cervical cerclage  suture present   2  05 36w0d   F Vag-Spont   KOFI      Complications: Cervical cerclage suture present   1 SAB 00 20w0d   M    ND       Review of Systems  Review of Systems   Constitutional:  Negative for chills, fatigue and fever.   Eyes:  Negative for visual disturbance.   Respiratory:  Negative for shortness of breath and wheezing.    Cardiovascular:  Negative for chest pain.   Gastrointestinal:  Negative for abdominal pain, bloating, constipation, nausea and vomiting.   Genitourinary:  Negative for dysuria, frequency, menstrual problem, urgency, vaginal bleeding, vaginal discharge, vaginal pain, vaginal dryness and vaginal odor.   Musculoskeletal:  Negative for back pain.   Integumentary:  Positive for breast tenderness. Negative for nipple discharge.   Neurological:  Negative for headaches.   Hematological:  Negative for adenopathy.   Psychiatric/Behavioral:  Negative for depression and sleep disturbance. The patient is not nervous/anxious.    All other systems reviewed and are negative.  Breast: Positive for tenderness.Negative for lump and nipple discharge        Objective:      Physical Exam:   Constitutional: She is oriented to person, place, and time. She appears well-developed and well-nourished. No distress.    HENT:   Head: Normocephalic and atraumatic.    Eyes: Pupils are equal, round, and reactive to light. Conjunctivae and EOM are normal.    Neck: No tracheal deviation present. No thyromegaly present.    Cardiovascular:  Normal rate, regular rhythm and normal heart sounds.      Exam reveals no gallop, no friction rub, no clubbing, no cyanosis and no edema.       No murmur heard.   Pulmonary/Chest: Effort normal and breath sounds normal. No respiratory distress. She has no wheezes. She has no rales. She exhibits no tenderness.        Abdominal: Soft. Bowel sounds are normal. She exhibits no distension. There is no abdominal tenderness. There is no rebound and no guarding. Hernia  confirmed negative in the right inguinal area and confirmed negative in the left inguinal area.     Genitourinary:    Inguinal canal, vagina, uterus, right adnexa and left adnexa normal.   Rectum:      No external hemorrhoid.      Pelvic exam was performed with patient supine.   The external female genitalia was normal.   No external genitalia lesions identified,Genitalia hair distrobution normal .   Labial bartholins normal.There is no rash, tenderness, lesion or injury on the right labia. There is no rash, tenderness, lesion or injury on the left labia. Cervix is normal. Right adnexum displays no mass, no tenderness and no fullness. Left adnexum displays no mass, no tenderness and no fullness. No erythema,  no vaginal discharge, tenderness or bleeding in the vagina.    No foreign body in the vagina.      No signs of injury in the vagina.   Vagina was moist.Cervix exhibits no motion tenderness, no lesion, no friability, no lesion, no tenderness and no polyp.    pap smear not completedUerus contour normal  Uterus is not enlarged and not tender. Uterus size: 8 cm.Normal urethral meatus.Urethral Meatus exhibits: urethral lesion and prolapsedUrethra findings: no urethral mass, no tenderness and no urethral scarringBladder findings: no bladder distention and no bladder tenderness          Musculoskeletal: Normal range of motion and moves all extremeties.      Lymphadenopathy: No inguinal adenopathy noted on the right or left side.    Neurological: She is alert and oriented to person, place, and time.    Skin: Skin is warm and dry. No rash noted. She is not diaphoretic. No cyanosis or erythema. No pallor. Nails show no clubbing.    Psychiatric: She has a normal mood and affect. Her behavior is normal. Judgment and thought content normal.         Assessment:        1. Positive pregnancy test    2. Uterine size date discrepancy pregnancy, first trimester               Plan:   1. Amenorrhea  -- + UPT in office, Patient's  last menstrual period was 11/28/2022 (exact date). --> Estimated Date of Delivery: 9/4/2023.  --Likely at 5w3d via LMP  -- Dating US 3 weeks  -- Routine serum and urine prenatal labs today    2. Physical exam today.     3. Body mass index is 24.95 kg/m².  -- Discussed IOM recommended weight gain of:   Weight category Pre pre BMI  Recommended TWG   Underweight Less than 18.5  28-40    Normal Weight 18.5-24.9  25-35    Overweight 25-29.9  15-25    Obese   30 and greater  11-20   -- Discussed criteria for delivery at [facility] r/t excessive pre-preg weight or excessive weight gain:   Pre-pregnancy BMI over 40 or excess pregnancy weight gain defined as:   Pre-preg BMI < 18.5; Excess weight gain = > 60 pound   Pre-preg BMI 18.5-24.9;  Excess weight gain = > 53 pounds   Pre-preg BMI 25-29.9;  Excess weight gain = > 38 pounds   Pre-preg BMI > 30;  Excess weight gain = > 30 pounds    4. Discussed nausea and vomiting in pregnancy  -- Education regarding lifestyle and dietary modifications  -- Reviewed use of B6/Unisom prn. Pt will notify us if no relief/worsening symptoms, will consider alternative therapies prn    5. Pregnancy education and couseling; handouts and booklet provided  -- Oriented to practice and anticipated prenatal course of care and how to contact us  -- Precautions/warning signs reviewed  -- Common complaints of pregnancy  -- Routine prenatal labs including HIV  -- Ultrasounds  -- Childbirth education/hospital/[facility] facilities  -- Nutrition, prepregnant BMI, and recommended weight gain  -- Toxoplasmosis precautions (Cats/Raw Meat)  -- Sexual activity and exercise  -- Environmental/Work hazards  -- Travel  -- Tobacco (Ask, Advise, Assess, Assist, and Arrange), as well as alcohol and drug use  -- Use of any medications (Including supplements, Vitamins, Herbs, or OTC Drugs)  -- COVID policies/precautions    6. Reviewed warning signs, precautions, and how/when to contact us.     7. RTC x 3 weeks, call or present sooner  prn.     Positive pregnancy test  -     C. trachomatis/N. gonorrhoeae by AMP DNA  -     CBC Auto Differential; Future; Expected date: 01/05/2023  -     HIV 1/2 Ag/Ab (4th Gen); Future; Expected date: 01/05/2023  -     POCT urine pregnancy  -     RPR; Future; Expected date: 01/05/2023  -     Rubella Antibody, IgG; Future; Expected date: 01/05/2023  -     Type & Screen; Future; Expected date: 01/05/2023  -     Urine culture  -     US OB/GYN Procedure (Viewpoint); Future  -     Hemoglobin A1C; Future; Expected date: 01/05/2023  -     Hepatitis Panel, Acute; Future; Expected date: 01/05/2023  -     Comprehensive Metabolic Panel; Future; Expected date: 01/05/2023  -     Protein/Creatinine Ratio, Urine    Uterine size date discrepancy pregnancy, first trimester  -     US OB/GYN Procedure (Viewpoint); Future

## 2023-01-06 LAB
ABO + RH BLD: NORMAL
ALBUMIN SERPL BCP-MCNC: 4.2 G/DL (ref 3.5–5.2)
ALP SERPL-CCNC: 36 U/L (ref 55–135)
ALT SERPL W/O P-5'-P-CCNC: 10 U/L (ref 10–44)
ANION GAP SERPL CALC-SCNC: 8 MMOL/L (ref 8–16)
AST SERPL-CCNC: 12 U/L (ref 10–40)
BASOPHILS # BLD AUTO: 0.03 K/UL (ref 0–0.2)
BASOPHILS NFR BLD: 0.5 % (ref 0–1.9)
BILIRUB SERPL-MCNC: 0.5 MG/DL (ref 0.1–1)
BLD GP AB SCN CELLS X3 SERPL QL: NORMAL
BUN SERPL-MCNC: 11 MG/DL (ref 6–20)
C TRACH DNA SPEC QL NAA+PROBE: NOT DETECTED
CALCIUM SERPL-MCNC: 9.7 MG/DL (ref 8.7–10.5)
CHLORIDE SERPL-SCNC: 105 MMOL/L (ref 95–110)
CO2 SERPL-SCNC: 23 MMOL/L (ref 23–29)
CREAT SERPL-MCNC: 0.7 MG/DL (ref 0.5–1.4)
CREAT UR-MCNC: 100 MG/DL (ref 15–325)
DIFFERENTIAL METHOD: ABNORMAL
EOSINOPHIL # BLD AUTO: 0 K/UL (ref 0–0.5)
EOSINOPHIL NFR BLD: 0.3 % (ref 0–8)
ERYTHROCYTE [DISTWIDTH] IN BLOOD BY AUTOMATED COUNT: 12 % (ref 11.5–14.5)
EST. GFR  (NO RACE VARIABLE): >60 ML/MIN/1.73 M^2
ESTIMATED AVG GLUCOSE: 94 MG/DL (ref 68–131)
GLUCOSE SERPL-MCNC: 88 MG/DL (ref 70–110)
HAV IGM SERPL QL IA: NORMAL
HBA1C MFR BLD: 4.9 % (ref 4–5.6)
HBV CORE IGM SERPL QL IA: NORMAL
HBV SURFACE AG SERPL QL IA: NORMAL
HCT VFR BLD AUTO: 38.5 % (ref 37–48.5)
HCV AB SERPL QL IA: NORMAL
HGB BLD-MCNC: 12.5 G/DL (ref 12–16)
HIV 1+2 AB+HIV1 P24 AG SERPL QL IA: NORMAL
IMM GRANULOCYTES # BLD AUTO: 0.01 K/UL (ref 0–0.04)
IMM GRANULOCYTES NFR BLD AUTO: 0.2 % (ref 0–0.5)
LYMPHOCYTES # BLD AUTO: 1.1 K/UL (ref 1–4.8)
LYMPHOCYTES NFR BLD: 18.7 % (ref 18–48)
MCH RBC QN AUTO: 29.8 PG (ref 27–31)
MCHC RBC AUTO-ENTMCNC: 32.5 G/DL (ref 32–36)
MCV RBC AUTO: 92 FL (ref 82–98)
MONOCYTES # BLD AUTO: 0.3 K/UL (ref 0.3–1)
MONOCYTES NFR BLD: 5.8 % (ref 4–15)
N GONORRHOEA DNA SPEC QL NAA+PROBE: NOT DETECTED
NEUTROPHILS # BLD AUTO: 4.3 K/UL (ref 1.8–7.7)
NEUTROPHILS NFR BLD: 74.5 % (ref 38–73)
NRBC BLD-RTO: 0 /100 WBC
PLATELET # BLD AUTO: 190 K/UL (ref 150–450)
PMV BLD AUTO: 11 FL (ref 9.2–12.9)
POTASSIUM SERPL-SCNC: 3.7 MMOL/L (ref 3.5–5.1)
PROT SERPL-MCNC: 6.9 G/DL (ref 6–8.4)
PROT UR-MCNC: <7 MG/DL (ref 0–15)
PROT/CREAT UR: NORMAL MG/G{CREAT} (ref 0–0.2)
RBC # BLD AUTO: 4.19 M/UL (ref 4–5.4)
RPR SER QL: NORMAL
RUBV IGG SER-ACNC: 12.5 IU/ML
RUBV IGG SER-IMP: REACTIVE
SODIUM SERPL-SCNC: 136 MMOL/L (ref 136–145)
WBC # BLD AUTO: 5.73 K/UL (ref 3.9–12.7)

## 2023-01-08 LAB — BACTERIA UR CULT: ABNORMAL

## 2023-01-09 DIAGNOSIS — N39.0 E-COLI UTI: Primary | ICD-10-CM

## 2023-01-09 DIAGNOSIS — B96.20 E-COLI UTI: Primary | ICD-10-CM

## 2023-01-09 RX ORDER — AMOXICILLIN AND CLAVULANATE POTASSIUM 875; 125 MG/1; MG/1
1 TABLET, FILM COATED ORAL 2 TIMES DAILY
Qty: 14 TABLET | Refills: 0 | Status: SHIPPED | OUTPATIENT
Start: 2023-01-09 | End: 2023-01-16

## 2023-01-09 NOTE — PROGRESS NOTES
Spoke with patient name and  verified. Patient was informed of results. Patient voiced and verbalized understanding.

## 2023-01-14 ENCOUNTER — NURSE TRIAGE (OUTPATIENT)
Dept: ADMINISTRATIVE | Facility: CLINIC | Age: 38
End: 2023-01-14
Payer: MEDICAID

## 2023-01-15 NOTE — TELEPHONE ENCOUNTER
Pt stated that she is two months pregnant and started spotting today. Pt stated that this is her 7th pregnancy and has hx of an Incompetent cervix. Care advice to go into ED now per protocol. Verbalized understanding. Encounter routed to provider.       Reason for Disposition   Patient sounds very sick or weak to the triager    Additional Information   Negative: Shock suspected (e.g., cold/pale/clammy skin, too weak to stand, low BP, rapid pulse)   Negative: Difficult to awaken or acting confused (e.g., disoriented, slurred speech)   Negative: Passed out (i.e., lost consciousness, collapsed and was not responding)   Negative: Sounds like a life-threatening emergency to the triager   Negative: SEVERE abdominal pain   Negative: [1] SEVERE vaginal bleeding (e.g., soaking 2 pads / hour, large blood clots) AND [2] present 2 or more hours   Negative: SEVERE dizziness (e.g., unable to stand, requires support to walk, feels like passing out)   Negative: [1] MODERATE vaginal bleeding (e.g., soaking 1 pad per hour; clots) AND [2] present > 6 hours   Negative: [1] MODERATE vaginal bleeding (e.g., soaking 1 pad per hour; clots) AND [2] pregnant > 12 weeks   Negative: Passed tissue (e.g., gray-white)   Negative: Shoulder pain   Negative: Pale skin (pallor) of new-onset or worsening    Protocols used: Pregnancy - Vaginal Bleeding Less Than 20 Weeks [facility]

## 2023-01-23 ENCOUNTER — PROCEDURE VISIT (OUTPATIENT)
Dept: OBSTETRICS AND GYNECOLOGY | Facility: CLINIC | Age: 38
End: 2023-01-23
Payer: MEDICAID

## 2023-01-23 ENCOUNTER — INITIAL PRENATAL (OUTPATIENT)
Dept: OBSTETRICS AND GYNECOLOGY | Facility: CLINIC | Age: 38
End: 2023-01-23
Payer: MEDICAID

## 2023-01-23 VITALS
SYSTOLIC BLOOD PRESSURE: 110 MMHG | DIASTOLIC BLOOD PRESSURE: 76 MMHG | WEIGHT: 123.69 LBS | BODY MASS INDEX: 24.15 KG/M2

## 2023-01-23 DIAGNOSIS — O26.841 UTERINE SIZE DATE DISCREPANCY PREGNANCY, FIRST TRIMESTER: ICD-10-CM

## 2023-01-23 DIAGNOSIS — Z98.890 HISTORY OF CERCLAGE, CURRENTLY PREGNANT: ICD-10-CM

## 2023-01-23 DIAGNOSIS — N39.0 E-COLI UTI: ICD-10-CM

## 2023-01-23 DIAGNOSIS — O09.299 HISTORY OF CERCLAGE, CURRENTLY PREGNANT: ICD-10-CM

## 2023-01-23 DIAGNOSIS — O09.291 HISTORY OF INCOMPETENT CERVIX, CURRENTLY PREGNANT IN FIRST TRIMESTER: Primary | ICD-10-CM

## 2023-01-23 DIAGNOSIS — Z36.86 ENCOUNTER FOR ANTENATAL SCREENING FOR CERVICAL LENGTH: ICD-10-CM

## 2023-01-23 DIAGNOSIS — B96.20 E-COLI UTI: ICD-10-CM

## 2023-01-23 DIAGNOSIS — O21.9 NAUSEA AND VOMITING IN PREGNANCY: ICD-10-CM

## 2023-01-23 DIAGNOSIS — Z32.01 POSITIVE PREGNANCY TEST: ICD-10-CM

## 2023-01-23 PROCEDURE — 76801 US OB/GYN PROCEDURE (VIEWPOINT): ICD-10-PCS | Mod: 26,S$PBB,, | Performed by: OBSTETRICS & GYNECOLOGY

## 2023-01-23 PROCEDURE — 76801 OB US < 14 WKS SINGLE FETUS: CPT | Mod: PBBFAC,PN | Performed by: OBSTETRICS & GYNECOLOGY

## 2023-01-23 PROCEDURE — 99213 PR OFFICE/OUTPT VISIT, EST, LEVL III, 20-29 MIN: ICD-10-PCS | Mod: TH,S$PBB,, | Performed by: MIDWIFE

## 2023-01-23 PROCEDURE — 99212 OFFICE O/P EST SF 10 MIN: CPT | Mod: PBBFAC,TH,PN | Performed by: MIDWIFE

## 2023-01-23 PROCEDURE — 99213 OFFICE O/P EST LOW 20 MIN: CPT | Mod: TH,S$PBB,, | Performed by: MIDWIFE

## 2023-01-23 PROCEDURE — 99999 PR PBB SHADOW E&M-EST. PATIENT-LVL II: CPT | Mod: PBBFAC,,, | Performed by: MIDWIFE

## 2023-01-23 PROCEDURE — 99999 PR PBB SHADOW E&M-EST. PATIENT-LVL II: ICD-10-PCS | Mod: PBBFAC,,, | Performed by: MIDWIFE

## 2023-01-23 RX ORDER — ONDANSETRON 8 MG/1
8 TABLET, ORALLY DISINTEGRATING ORAL EVERY 12 HOURS PRN
Qty: 30 TABLET | Refills: 0 | Status: ON HOLD | OUTPATIENT
Start: 2023-01-23 | End: 2023-08-20 | Stop reason: HOSPADM

## 2023-01-23 RX ORDER — NITROFURANTOIN 25; 75 MG/1; MG/1
100 CAPSULE ORAL 2 TIMES DAILY
Qty: 14 CAPSULE | Refills: 0 | Status: SHIPPED | OUTPATIENT
Start: 2023-01-23 | End: 2023-01-30

## 2023-01-23 RX ORDER — CEPHALEXIN 500 MG/1
500 CAPSULE ORAL 3 TIMES DAILY
COMMUNITY
Start: 2023-01-16 | End: 2023-01-23

## 2023-01-23 NOTE — PROGRESS NOTES
37 y.o. female  at 8w0d   Pt here for initial OB and dating US  Dating US today reveals SVIUP with assigned HEATH 2023. Embryo grossly wnl with normal cardiac activity. Normal uterus, cervix, and adnexae. No fluid seen in cul de sac.     Pt c/o persistent UTI symptoms. Was recently treated for UTI and was seen at [place] and received IV fluids and IV Rocephin. Finished course of Keflex and Augmentin, but reports still with symptoms. Pt states hx of recurrent UTIs during pregnancy. Will try round of Macrobid and retest urine culture if no response to treatment.   Also with c/o N&V, requests medication be sent to pharmacy. zofran sent.     Prenatal labs reviewed, wnl with the exception of E.coli UTI on urine culture. Needs repeat Pap PP.   Hx of cervical incompetence- reviewed with Dr. Lema, will have patient return at 12 weeks with US to assess cervical length with plan of placing cerclage at 13w. Pt verbalized understanding.     Genetic testing to be offered nv   Reviewed warning signs, pregnancy precautions and how/when to call.  RTC x 4 wks with US, call or present sooner prn.     I spent a total of 20 minutes on the day of the visit.This includes face to face time and non-face to face time preparing to see the patient (eg, review of tests), Obtaining and/or reviewing separately obtained history, Documenting clinical information in the electronic or other health record, Independently interpreting resultsand communicating results to the patient/family/caregiver, or Care coordination.

## 2023-02-19 ENCOUNTER — PATIENT MESSAGE (OUTPATIENT)
Dept: OBSTETRICS AND GYNECOLOGY | Facility: CLINIC | Age: 38
End: 2023-02-19
Payer: MEDICAID

## 2023-02-23 ENCOUNTER — PATIENT MESSAGE (OUTPATIENT)
Dept: OBSTETRICS AND GYNECOLOGY | Facility: CLINIC | Age: 38
End: 2023-02-23

## 2023-02-23 ENCOUNTER — PATIENT MESSAGE (OUTPATIENT)
Dept: OBSTETRICS AND GYNECOLOGY | Facility: CLINIC | Age: 38
End: 2023-02-23
Payer: MEDICAID

## 2023-02-23 ENCOUNTER — PROCEDURE VISIT (OUTPATIENT)
Dept: OBSTETRICS AND GYNECOLOGY | Facility: CLINIC | Age: 38
End: 2023-02-23
Payer: MEDICAID

## 2023-02-23 ENCOUNTER — ROUTINE PRENATAL (OUTPATIENT)
Dept: OBSTETRICS AND GYNECOLOGY | Facility: CLINIC | Age: 38
End: 2023-02-23
Payer: MEDICAID

## 2023-02-23 ENCOUNTER — LAB VISIT (OUTPATIENT)
Dept: LAB | Facility: HOSPITAL | Age: 38
End: 2023-02-23
Attending: OBSTETRICS & GYNECOLOGY
Payer: MEDICAID

## 2023-02-23 VITALS — WEIGHT: 122 LBS | SYSTOLIC BLOOD PRESSURE: 110 MMHG | DIASTOLIC BLOOD PRESSURE: 72 MMHG | BODY MASS INDEX: 23.83 KG/M2

## 2023-02-23 DIAGNOSIS — O09.291 HISTORY OF INCOMPETENT CERVIX, CURRENTLY PREGNANT IN FIRST TRIMESTER: Primary | ICD-10-CM

## 2023-02-23 DIAGNOSIS — O09.521 MULTIGRAVIDA OF ADVANCED MATERNAL AGE IN FIRST TRIMESTER: ICD-10-CM

## 2023-02-23 DIAGNOSIS — Z36.86 ENCOUNTER FOR ANTENATAL SCREENING FOR CERVICAL LENGTH: ICD-10-CM

## 2023-02-23 DIAGNOSIS — O09.291 HISTORY OF INCOMPETENT CERVIX, CURRENTLY PREGNANT IN FIRST TRIMESTER: ICD-10-CM

## 2023-02-23 PROCEDURE — 36415 COLL VENOUS BLD VENIPUNCTURE: CPT | Mod: PO | Performed by: OBSTETRICS & GYNECOLOGY

## 2023-02-23 PROCEDURE — 85027 COMPLETE CBC AUTOMATED: CPT | Performed by: OBSTETRICS & GYNECOLOGY

## 2023-02-23 PROCEDURE — 80048 BASIC METABOLIC PNL TOTAL CA: CPT | Performed by: OBSTETRICS & GYNECOLOGY

## 2023-02-23 PROCEDURE — 76817 TRANSVAGINAL US OBSTETRIC: CPT | Mod: PBBFAC,PN | Performed by: OBSTETRICS & GYNECOLOGY

## 2023-02-23 PROCEDURE — 76815 US OB/GYN PROCEDURE (VIEWPOINT): ICD-10-PCS | Mod: 26,S$PBB,, | Performed by: OBSTETRICS & GYNECOLOGY

## 2023-02-23 PROCEDURE — 99214 OFFICE O/P EST MOD 30 MIN: CPT | Mod: TH,S$PBB,, | Performed by: OBSTETRICS & GYNECOLOGY

## 2023-02-23 PROCEDURE — 99999 PR PBB SHADOW E&M-EST. PATIENT-LVL II: ICD-10-PCS | Mod: PBBFAC,,, | Performed by: OBSTETRICS & GYNECOLOGY

## 2023-02-23 PROCEDURE — 76815 OB US LIMITED FETUS(S): CPT | Mod: 26,S$PBB,, | Performed by: OBSTETRICS & GYNECOLOGY

## 2023-02-23 PROCEDURE — 99999 PR PBB SHADOW E&M-EST. PATIENT-LVL II: CPT | Mod: PBBFAC,,, | Performed by: OBSTETRICS & GYNECOLOGY

## 2023-02-23 PROCEDURE — 99214 PR OFFICE/OUTPT VISIT, EST, LEVL IV, 30-39 MIN: ICD-10-PCS | Mod: TH,S$PBB,, | Performed by: OBSTETRICS & GYNECOLOGY

## 2023-02-23 PROCEDURE — 76817 US OB/GYN PROCEDURE (VIEWPOINT): ICD-10-PCS | Mod: 26,S$PBB,, | Performed by: OBSTETRICS & GYNECOLOGY

## 2023-02-23 PROCEDURE — 99212 OFFICE O/P EST SF 10 MIN: CPT | Mod: PBBFAC,TH,PN | Performed by: OBSTETRICS & GYNECOLOGY

## 2023-02-23 NOTE — H&P (VIEW-ONLY)
Complaints today:  Aware needs cerclage  Currently 12w3d  sono cervix 3 cm long/closed  Wants [id number]    /72   Wt 55.3 kg (122 lb 0.4 oz)   LMP 2022 (Exact Date)   BMI 23.83 kg/m²     38 y.o., at 12w3d by Estimated Date of Delivery: 23  Patient Active Problem List   Diagnosis    History of incompetent cervix, currently pregnant in first trimester    E-coli UTI    History of cerclage, currently pregnant    Nausea and vomiting in pregnancy     OB History    Para Term  AB Living   9 6 4 2 2 6   SAB IAB Ectopic Multiple Live Births   2 0 0 0 6      # Outcome Date GA Lbr Andres/2nd Weight Sex Delivery Anes PTL Lv   9 Current            8 Term 19 39w0d / 00:19 2.693 kg (5 lb 15 oz) M Vag-Spont None N KOFI   7 Term 17 39w5d  3.035 kg (6 lb 11.1 oz) M Vag-Spont None N KOFI   6 Term 14 38w0d   M Vag-Spont   KOFI      Complications: Cervical cerclage suture present   5 Term 13 38w0d   M Vag-Spont   KOFI      Complications: Cervical cerclage suture present   4 SAB 12 20w0d   F SAB   ND   3  10/31/09 36w0d   F Vag-Spont   KOFI      Complications: Cervical cerclage suture present   2  05 36w0d   F Vag-Spont   KOFI      Complications: Cervical cerclage suture present   1 SAB 00 20w0d   M    ND       Dating reviewed    Allergies and problem list reviewed and updated    Medical and surgical history reviewed    Prenatal labs reviewed and updated    Physical Exam:  ABD: soft, gravid, nontender,     Assessment:  Encounter Diagnoses   Name Primary?    History of incompetent cervix, currently pregnant in first trimester Yes    Multigravida of advanced maternal age in first trimester          Plan:   Reviewed cervical cerclage  procedure in detail.  Reviewed risks including but not limited to infection, bleeding, damage to bowel/bladder, cva;htn, dvt, death,  rupture of membranes, spontaneous ..    All questions answered to the best  of my ability.  Consents signed witnessed.  Case request submitted  Cbc, bmp today  F/u 2 wks with sono to check cerclage  Start vaginal prometrium in 2 wks  [id number] ordered  Connected MOM enrolled

## 2023-02-23 NOTE — PROGRESS NOTES
Complaints today:  Aware needs cerclage  Currently 12w3d  sono cervix 3 cm long/closed  Wants [id number]    /72   Wt 55.3 kg (122 lb 0.4 oz)   LMP 2022 (Exact Date)   BMI 23.83 kg/m²     38 y.o., at 12w3d by Estimated Date of Delivery: 23  Patient Active Problem List   Diagnosis    History of incompetent cervix, currently pregnant in first trimester    E-coli UTI    History of cerclage, currently pregnant    Nausea and vomiting in pregnancy     OB History    Para Term  AB Living   9 6 4 2 2 6   SAB IAB Ectopic Multiple Live Births   2 0 0 0 6      # Outcome Date GA Lbr Andres/2nd Weight Sex Delivery Anes PTL Lv   9 Current            8 Term 19 39w0d / 00:19 2.693 kg (5 lb 15 oz) M Vag-Spont None N KOFI   7 Term 17 39w5d  3.035 kg (6 lb 11.1 oz) M Vag-Spont None N KOFI   6 Term 14 38w0d   M Vag-Spont   KOFI      Complications: Cervical cerclage suture present   5 Term 13 38w0d   M Vag-Spont   KOFI      Complications: Cervical cerclage suture present   4 SAB 12 20w0d   F SAB   ND   3  10/31/09 36w0d   F Vag-Spont   KOFI      Complications: Cervical cerclage suture present   2  05 36w0d   F Vag-Spont   KOFI      Complications: Cervical cerclage suture present   1 SAB 00 20w0d   M    ND       Dating reviewed    Allergies and problem list reviewed and updated    Medical and surgical history reviewed    Prenatal labs reviewed and updated    Physical Exam:  ABD: soft, gravid, nontender,     Assessment:  Encounter Diagnoses   Name Primary?    History of incompetent cervix, currently pregnant in first trimester Yes    Multigravida of advanced maternal age in first trimester          Plan:   Reviewed cervical cerclage  procedure in detail.  Reviewed risks including but not limited to infection, bleeding, damage to bowel/bladder, cva;htn, dvt, death,  rupture of membranes, spontaneous ..    All questions answered to the best  of my ability.  Consents signed witnessed.  Case request submitted  Cbc, bmp today  F/u 2 wks with sono to check cerclage  Start vaginal prometrium in 2 wks  [id number] ordered  Connected MOM enrolled

## 2023-02-24 ENCOUNTER — PATIENT MESSAGE (OUTPATIENT)
Dept: SURGERY | Facility: HOSPITAL | Age: 38
End: 2023-02-24
Payer: MEDICAID

## 2023-02-24 LAB
ANION GAP SERPL CALC-SCNC: 8 MMOL/L (ref 8–16)
BUN SERPL-MCNC: 8 MG/DL (ref 6–20)
CALCIUM SERPL-MCNC: 9 MG/DL (ref 8.7–10.5)
CHLORIDE SERPL-SCNC: 104 MMOL/L (ref 95–110)
CO2 SERPL-SCNC: 24 MMOL/L (ref 23–29)
CREAT SERPL-MCNC: 0.7 MG/DL (ref 0.5–1.4)
ERYTHROCYTE [DISTWIDTH] IN BLOOD BY AUTOMATED COUNT: 13.3 % (ref 11.5–14.5)
EST. GFR  (NO RACE VARIABLE): >60 ML/MIN/1.73 M^2
GLUCOSE SERPL-MCNC: 84 MG/DL (ref 70–110)
HCT VFR BLD AUTO: 36.6 % (ref 37–48.5)
HGB BLD-MCNC: 12.2 G/DL (ref 12–16)
MCH RBC QN AUTO: 31 PG (ref 27–31)
MCHC RBC AUTO-ENTMCNC: 33.3 G/DL (ref 32–36)
MCV RBC AUTO: 93 FL (ref 82–98)
PLATELET # BLD AUTO: 155 K/UL (ref 150–450)
PMV BLD AUTO: 10.3 FL (ref 9.2–12.9)
POTASSIUM SERPL-SCNC: 4.3 MMOL/L (ref 3.5–5.1)
RBC # BLD AUTO: 3.94 M/UL (ref 4–5.4)
SODIUM SERPL-SCNC: 136 MMOL/L (ref 136–145)
WBC # BLD AUTO: 6.16 K/UL (ref 3.9–12.7)

## 2023-02-28 ENCOUNTER — TELEPHONE (OUTPATIENT)
Dept: PREADMISSION TESTING | Facility: HOSPITAL | Age: 38
End: 2023-02-28
Payer: MEDICAID

## 2023-02-28 NOTE — TELEPHONE ENCOUNTER
Called and spoke with Pt about the following:     Please arrive to [facility] ([facility]) at 8:45 am on 3/01/23 for your scheduled procedure.  Address: [address] [place] [address] [place]. [id number] ([address] on left, 1st Floor Lobby)  >>>NO eating or drinking after midnight unless instructed otherwise by your Surgeon<<<

## 2023-03-01 ENCOUNTER — ANESTHESIA EVENT (OUTPATIENT)
Dept: SURGERY | Facility: HOSPITAL | Age: 38
End: 2023-03-01
Payer: MEDICAID

## 2023-03-01 ENCOUNTER — ANESTHESIA (OUTPATIENT)
Dept: SURGERY | Facility: HOSPITAL | Age: 38
End: 2023-03-01
Payer: MEDICAID

## 2023-03-01 ENCOUNTER — PATIENT MESSAGE (OUTPATIENT)
Dept: SURGERY | Facility: HOSPITAL | Age: 38
End: 2023-03-01
Payer: MEDICAID

## 2023-03-01 ENCOUNTER — HOSPITAL ENCOUNTER (OUTPATIENT)
Facility: HOSPITAL | Age: 38
Discharge: HOME | End: 2023-03-01
Attending: OBSTETRICS & GYNECOLOGY | Admitting: OBSTETRICS & GYNECOLOGY
Payer: MEDICAID

## 2023-03-01 DIAGNOSIS — O34.32 INCOMPETENT CERVIX DURING SECOND TRIMESTER, ANTEPARTUM: ICD-10-CM

## 2023-03-01 DIAGNOSIS — O34.32 CERVICAL CERCLAGE SUTURE PRESENT, SECOND TRIMESTER: Primary | ICD-10-CM

## 2023-03-01 PROCEDURE — 25000003 PHARM REV CODE 250

## 2023-03-01 PROCEDURE — 71000033 HC RECOVERY, INTIAL HOUR: Performed by: OBSTETRICS & GYNECOLOGY

## 2023-03-01 PROCEDURE — 59320 PR REVISION CERVIX W PREG,VAG APPRCH: ICD-10-PCS | Mod: ,,, | Performed by: OBSTETRICS & GYNECOLOGY

## 2023-03-01 PROCEDURE — 36000706: Performed by: OBSTETRICS & GYNECOLOGY

## 2023-03-01 PROCEDURE — 37000009 HC ANESTHESIA EA ADD 15 MINS: Performed by: OBSTETRICS & GYNECOLOGY

## 2023-03-01 PROCEDURE — 36000707: Performed by: OBSTETRICS & GYNECOLOGY

## 2023-03-01 PROCEDURE — 00948 ANES VAG PX CRV CERCLAGE: CPT | Performed by: OBSTETRICS & GYNECOLOGY

## 2023-03-01 PROCEDURE — 63600175 PHARM REV CODE 636 W HCPCS

## 2023-03-01 PROCEDURE — 71000015 HC POSTOP RECOV 1ST HR: Performed by: OBSTETRICS & GYNECOLOGY

## 2023-03-01 PROCEDURE — 59320 REVISION OF CERVIX: CPT | Mod: ,,, | Performed by: OBSTETRICS & GYNECOLOGY

## 2023-03-01 PROCEDURE — 37000008 HC ANESTHESIA 1ST 15 MINUTES: Performed by: OBSTETRICS & GYNECOLOGY

## 2023-03-01 PROCEDURE — 25000003 PHARM REV CODE 250: Performed by: ANESTHESIOLOGY

## 2023-03-01 RX ORDER — LIDOCAINE HYDROCHLORIDE 20 MG/ML
INJECTION, SOLUTION EPIDURAL; INFILTRATION; INTRACAUDAL; PERINEURAL
Status: DISCONTINUED | OUTPATIENT
Start: 2023-03-01 | End: 2023-03-01

## 2023-03-01 RX ORDER — ALBUTEROL SULFATE 0.83 MG/ML
2.5 SOLUTION RESPIRATORY (INHALATION) EVERY 4 HOURS PRN
Status: DISCONTINUED | OUTPATIENT
Start: 2023-03-01 | End: 2023-03-01 | Stop reason: HOSPADM

## 2023-03-01 RX ORDER — ONDANSETRON HYDROCHLORIDE 2 MG/ML
INJECTION, SOLUTION INTRAMUSCULAR; INTRAVENOUS
Status: DISCONTINUED | OUTPATIENT
Start: 2023-03-01 | End: 2023-03-01

## 2023-03-01 RX ORDER — SUCCINYLCHOLINE CHLORIDE 20 MG/ML
INJECTION INTRAMUSCULAR; INTRAVENOUS
Status: DISCONTINUED | OUTPATIENT
Start: 2023-03-01 | End: 2023-03-01

## 2023-03-01 RX ORDER — MEPERIDINE HYDROCHLORIDE 25 MG/ML
12.5 INJECTION INTRAMUSCULAR; INTRAVENOUS; SUBCUTANEOUS ONCE
Status: DISCONTINUED | OUTPATIENT
Start: 2023-03-01 | End: 2023-03-01 | Stop reason: HOSPADM

## 2023-03-01 RX ORDER — IBUPROFEN 800 MG/1
800 TABLET ORAL EVERY 8 HOURS PRN
Qty: 2 TABLET | Refills: 0 | Status: SHIPPED | OUTPATIENT
Start: 2023-03-01 | End: 2023-03-03

## 2023-03-01 RX ORDER — HYDROMORPHONE HYDROCHLORIDE 2 MG/ML
0.2 INJECTION, SOLUTION INTRAMUSCULAR; INTRAVENOUS; SUBCUTANEOUS EVERY 5 MIN PRN
Status: DISCONTINUED | OUTPATIENT
Start: 2023-03-01 | End: 2023-03-01 | Stop reason: HOSPADM

## 2023-03-01 RX ORDER — PROCHLORPERAZINE EDISYLATE 5 MG/ML
5 INJECTION INTRAMUSCULAR; INTRAVENOUS EVERY 6 HOURS PRN
Status: DISCONTINUED | OUTPATIENT
Start: 2023-03-01 | End: 2023-03-01 | Stop reason: HOSPADM

## 2023-03-01 RX ORDER — IBUPROFEN 200 MG
800 TABLET ORAL ONCE
Status: DISCONTINUED | OUTPATIENT
Start: 2023-03-01 | End: 2023-03-01 | Stop reason: HOSPADM

## 2023-03-01 RX ORDER — SODIUM CHLORIDE 9 MG/ML
INJECTION, SOLUTION INTRAVENOUS CONTINUOUS
Status: DISCONTINUED | OUTPATIENT
Start: 2023-03-01 | End: 2023-03-27

## 2023-03-01 RX ORDER — ACETAMINOPHEN 500 MG
1000 TABLET ORAL
Status: DISCONTINUED | OUTPATIENT
Start: 2023-03-01 | End: 2023-03-27

## 2023-03-01 RX ORDER — PROCHLORPERAZINE EDISYLATE 5 MG/ML
5 INJECTION INTRAMUSCULAR; INTRAVENOUS EVERY 30 MIN PRN
Status: DISCONTINUED | OUTPATIENT
Start: 2023-03-01 | End: 2023-03-01 | Stop reason: HOSPADM

## 2023-03-01 RX ORDER — ONDANSETRON 2 MG/ML
4 INJECTION INTRAMUSCULAR; INTRAVENOUS DAILY PRN
Status: DISCONTINUED | OUTPATIENT
Start: 2023-03-01 | End: 2023-03-01 | Stop reason: HOSPADM

## 2023-03-01 RX ORDER — ONDANSETRON 8 MG/1
8 TABLET, ORALLY DISINTEGRATING ORAL EVERY 8 HOURS PRN
Status: DISCONTINUED | OUTPATIENT
Start: 2023-03-01 | End: 2023-03-01 | Stop reason: HOSPADM

## 2023-03-01 RX ORDER — SODIUM CHLORIDE 0.9 % (FLUSH) 0.9 %
3 SYRINGE (ML) INJECTION
Status: DISCONTINUED | OUTPATIENT
Start: 2023-03-01 | End: 2023-03-01 | Stop reason: HOSPADM

## 2023-03-01 RX ORDER — PROPOFOL 10 MG/ML
VIAL (ML) INTRAVENOUS
Status: DISCONTINUED | OUTPATIENT
Start: 2023-03-01 | End: 2023-03-01

## 2023-03-01 RX ORDER — OXYCODONE HYDROCHLORIDE 5 MG/1
5 TABLET ORAL
Status: DISCONTINUED | OUTPATIENT
Start: 2023-03-01 | End: 2023-03-01 | Stop reason: HOSPADM

## 2023-03-01 RX ORDER — BUPIVACAINE HYDROCHLORIDE 7.5 MG/ML
INJECTION, SOLUTION EPIDURAL; RETROBULBAR
Status: COMPLETED | OUTPATIENT
Start: 2023-03-01 | End: 2023-03-01

## 2023-03-01 RX ORDER — PROGESTERONE 200 MG/1
200 CAPSULE ORAL NIGHTLY
Qty: 30 CAPSULE | Refills: 11 | Status: SHIPPED | OUTPATIENT
Start: 2023-03-01 | End: 2023-03-27 | Stop reason: SDUPTHER

## 2023-03-01 RX ORDER — DIPHENHYDRAMINE HYDROCHLORIDE 50 MG/ML
25 INJECTION INTRAMUSCULAR; INTRAVENOUS EVERY 4 HOURS PRN
Status: CANCELLED | OUTPATIENT
Start: 2023-03-01

## 2023-03-01 RX ORDER — DIPHENHYDRAMINE HYDROCHLORIDE 50 MG/ML
25 INJECTION INTRAMUSCULAR; INTRAVENOUS EVERY 6 HOURS PRN
Status: DISCONTINUED | OUTPATIENT
Start: 2023-03-01 | End: 2023-03-01 | Stop reason: HOSPADM

## 2023-03-01 RX ADMIN — LIDOCAINE HYDROCHLORIDE 60 MG: 20 INJECTION, SOLUTION EPIDURAL; INFILTRATION; INTRACAUDAL; PERINEURAL at 09:03

## 2023-03-01 RX ADMIN — ONDANSETRON 4 MG: 2 INJECTION INTRAMUSCULAR; INTRAVENOUS at 10:03

## 2023-03-01 RX ADMIN — PROPOFOL 100 MG: 10 INJECTION, EMULSION INTRAVENOUS at 09:03

## 2023-03-01 RX ADMIN — BUPIVACAINE HYDROCHLORIDE 1 ML: 7.5 INJECTION, SOLUTION EPIDURAL; RETROBULBAR at 09:03

## 2023-03-01 RX ADMIN — SUCCINYLCHOLINE CHLORIDE 100 MG: 20 INJECTION, SOLUTION INTRAMUSCULAR; INTRAVENOUS; PARENTERAL at 09:03

## 2023-03-01 NOTE — ANESTHESIA POSTPROCEDURE EVALUATION
Anesthesia Post Evaluation    Patient: Ebony Rodriguez    Procedure(s) Performed: Procedure(s) (LRB):  CERCLAGE, CERVIX (N/A)    Final Anesthesia Type: general      Patient location during evaluation: PACU  Patient participation: Yes- Able to Participate  Level of consciousness: awake  Post-procedure vital signs: reviewed and stable  Pain management: adequate  Airway patency: patent    PONV status at discharge: No PONV  Anesthetic complications: no      Cardiovascular status: hemodynamically stable  Respiratory status: unassisted  Hydration status: euvolemic  Follow-up not needed.          Vitals Value Taken Time   /67 03/01/23 1111   Temp 36.6 °C (97.9 °F) 03/01/23 1110   Pulse 72 03/01/23 1113   Resp 29 03/01/23 1112   SpO2 99 % 03/01/23 1113   Vitals shown include unvalidated device data.      Event Time   Out of Recovery 11:16:00         Pain/Simon Score: Simon Score: 10 (3/1/2023 11:00 AM)

## 2023-03-01 NOTE — ANESTHESIA PROCEDURE NOTES
Intubation    Date/Time: 3/1/2023 9:54 AM  Performed by: Brennan Villeda CRNA  Authorized by: Lele Geller MD     Intubation:     Induction:  Rapid sequence induction    Intubated:  Postinduction    Mask Ventilation:  Easy mask    Attempts:  1    Attempted By:  CRNA    Method of Intubation:  Direct    Blade:  Ivette 3    Laryngeal View Grade: Grade I - full view of cords      Difficult Airway Encountered?: No      Complications:  None    Airway Device:  Oral endotracheal tube    Airway Device Size:  7.5    Style/Cuff Inflation:  Cuffed (inflated to minimal occlusive pressure)    Tube secured:  22    Secured at:  The lips    Placement Verified By:  Capnometry    Complicating Factors:  None    Findings Post-Intubation:  BS equal bilateral

## 2023-03-01 NOTE — TRANSFER OF CARE
Anesthesia Transfer of Care Note    Patient: Ebony Rodriguez    Procedure(s) Performed: Procedure(s) (LRB):  CERCLAGE, CERVIX (N/A)    Patient location: PACU    Anesthesia Type: general    Transport from OR: Transported from OR on room air with adequate spontaneous ventilation    Post pain: adequate analgesia    Post assessment: no apparent anesthetic complications    Post vital signs: stable    Level of consciousness: awake    Nausea/Vomiting: no nausea/vomiting    Complications: none    Transfer of care protocol was followed      Last vitals:   Visit Vitals  /66   Pulse 74   Temp 36.7 °C (98 °F) (Temporal)   Resp 17   Ht 5' (1.524 m)   Wt 55 kg (121 lb 4.1 oz)   LMP 11/28/2022 (Exact Date)   SpO2 100%   Breastfeeding No   BMI 23.68 kg/m²

## 2023-03-01 NOTE — ANESTHESIA PREPROCEDURE EVALUATION
03/01/2023  Ebony Rodriguez is a 38 y.o., female.    Patient Active Problem List   Diagnosis    History of incompetent cervix, currently pregnant in first trimester    E-coli UTI    History of cerclage, currently pregnant    Nausea and vomiting in pregnancy     Pre-op Assessment    I have reviewed the Patient Summary Reports.     I have reviewed the Nursing Notes. I have reviewed the NPO Status.   I have reviewed the Medications.     Review of Systems  Anesthesia Hx:  Denies Family Hx of Anesthesia complications.   Denies Personal Hx of Anesthesia complications.   Hematology/Oncology:  Hematology Normal   Oncology Normal     EENT/Dental:EENT/Dental Normal   Cardiovascular:  Cardiovascular Normal     Pulmonary:  Pulmonary Normal    Renal/:  Renal/ Normal     Hepatic/GI:  Hepatic/GI Normal    Musculoskeletal:  Musculoskeletal Normal    OB/GYN/PEDS:  12+ week IUP   Neurological:  Neurology Normal    Endocrine:  Endocrine Normal    Dermatological:  Skin Normal    Psych:  Psychiatric Normal           Physical Exam  General: Alert and Oriented    Airway:  Mallampati: II   Mouth Opening: Normal  TM Distance: Normal  Tongue: Normal  Neck ROM: Normal ROM        Anesthesia Plan  Type of Anesthesia, risks & benefits discussed:    Anesthesia Type: Spinal  Intra-op Monitoring Plan: Standard ASA Monitors  Informed Consent: Informed consent signed with the Patient and all parties understand the risks and agree with anesthesia plan.  All questions answered.   ASA Score: 2  Day of Surgery Review of History & Physical: I have interviewed and examined the patient. I have reviewed the patient's H&P dated:     Ready For Surgery From Anesthesia Perspective.     .

## 2023-03-01 NOTE — INTERVAL H&P NOTE
The patient has been examined and the H&P has been reviewed:    I concur with the findings and changes have been noted since the H&P was written: She is ready to proceed with cervical cerclage for incompetent cervix  +fht     Surgery risks, benefits and alternative options discussed and understood by patient/family.          There are no hospital problems to display for this patient.

## 2023-03-01 NOTE — BRIEF OP NOTE
[facility] - Surgery (Hospital)  Brief Operative Note    Surgery Date: 3/1/2023     Surgeon(s) and Role:     * Keila Escobedo MD - Primary    Assisting Surgeon: None    Pre-op Diagnosis:  History of incompetent cervix, currently pregnant in first trimester [O09.291]    Post-op Diagnosis:  Post-Op Diagnosis Codes:     * History of incompetent cervix, currently pregnant in first trimester [O09.291]    Procedure(s) (LRB):  CERCLAGE, CERVIX (N/A)    Anesthesia: General    Operative Findings: 3 cm closed cervix    Estimated Blood Loss: * No values recorded between 3/1/2023 10:00 AM and 3/1/2023 10:20 AM *         Specimens:   Specimen (24h ago, onward)      None              Discharge Note    OUTCOME: Patient tolerated treatment/procedure well without complication and is now ready for discharge.    DISPOSITION: Home or Self Care    FINAL DIAGNOSIS:  Cervical cerclage suture present, second trimester    FOLLOWUP: In clinic, dr escobedo 2 wks with sade (has scheduled appt)    DISCHARGE INSTRUCTIONS:    Discharge Procedure Orders   Diet general     Pelvic Rest   Order Comments: X 2 wks --no tampons , intercourse, douching     No dressing needed     Call MD for:  temperature >100.4     Call MD for:  persistent nausea and vomiting     Call MD for:  severe uncontrolled pain     Call MD for:  difficulty breathing, headache or visual disturbances

## 2023-03-01 NOTE — OP NOTE
[facility] - [facility] ([facility])  General Surgery  Operative Note    SUMMARY     Date of Procedure: 3/1/2023     Procedure: Procedure(s) (LRB):  CERCLAGE, CERVIX (N/A)       Surgeon(s) and Role:     * Keila Lema MD - Primary    Assisting Surgeon: None    Pre-Operative Diagnosis: History of incompetent cervix, currently pregnant in first trimester [O09.291]    Post-Operative Diagnosis: Post-Op Diagnosis Codes:     * History of incompetent cervix, currently pregnant in first trimester [O09.291]    Anesthesia: General    Operative Findings (including complications, if any): cervix closed, 3 cm long    Description of Technical Procedures: cervical cerclage    The patient was taken to the operating room where spinal anesthesia was placed and found to be adequate.  She was placed in the dorsal supine position with her legs in the Robles stirrups.  Her perineum was then prepped and draped in the normal sterile fashion, and her bladder was drained in an in and out fashion.  Time out was performed.    A weighted sterile speculum was placed in the vagina and the findings stated above were noted.  The anterior lip of the cervix was grasped with a Ring Forcep.  The bladder was retracted back with a right angle retractor.  A Croft cerclage was then performed using mersilene tape.  The knot was tied at 12 o'clock.  Examination of the cervix afterwards revealed a closed cervix.  Vaginal tear repaired with 3-0 chromic Excellent hemostasis was noted.  All instruments were removed from the vagina.    The patient tolerated the procedure well.  Sponge, laparotomy towels, and needle counts were correct x 2.  She will go to PACU in stable condition.     Significant Surgical Tasks Conducted by the Assistant(s), if Applicable: n/a    Estimated Blood Loss (EBL): * No values recorded between 3/1/2023 10:00 AM and 3/1/2023 10:20 AM *   5        Implants: * No implants in log *    Specimens:   Specimen (24h ago, onward)      None                     Condition: Good    Disposition: PACU - hemodynamically stable.    Attestation: I performed the procedure.

## 2023-03-01 NOTE — ANESTHESIA PROCEDURE NOTES
Spinal    Diagnosis: Cervical cerclage  Patient location during procedure: OR  Start time: 3/1/2023 9:37 AM  Timeout: 3/1/2023 9:36 AM  End time: 3/1/2023 9:42 AM    Staffing  Authorizing Provider: Lele Geller MD  Performing Provider: Brennan Villeda CRNA    Preanesthetic Checklist  Completed: patient identified, IV checked, site marked, risks and benefits discussed, surgical consent, monitors and equipment checked, pre-op evaluation and timeout performed  Spinal Block  Patient position: sitting  Prep: Betadine  Patient monitoring: heart rate, cardiac monitor, continuous pulse ox and frequent blood pressure checks  Approach: midline  Location: L3-4  Injection technique: single shot  CSF Fluid: clear free-flowing CSF  Needle  Needle type: pencil-tip   Needle gauge: 22 G  Needle length: 3.5 in  Needle localization: anatomical landmarks  Assessment  Final level of anesthesia: Patient reported one sided, patchy block.  Ease of block: easy  Medications:    Medications: bupivacaine (pf) (MARCAINE) injection 0.75% - Intraspinal   1 mL - 3/1/2023 9:40:00 AM

## 2023-03-02 ENCOUNTER — PATIENT MESSAGE (OUTPATIENT)
Dept: OBSTETRICS AND GYNECOLOGY | Facility: CLINIC | Age: 38
End: 2023-03-02
Payer: MEDICAID

## 2023-03-02 ENCOUNTER — TELEPHONE (OUTPATIENT)
Dept: OBSTETRICS AND GYNECOLOGY | Facility: CLINIC | Age: 38
End: 2023-03-02
Payer: MEDICAID

## 2023-03-02 VITALS
TEMPERATURE: 98 F | HEART RATE: 66 BPM | DIASTOLIC BLOOD PRESSURE: 67 MMHG | RESPIRATION RATE: 19 BRPM | WEIGHT: 121.25 LBS | HEIGHT: 60 IN | SYSTOLIC BLOOD PRESSURE: 114 MMHG | OXYGEN SATURATION: 99 % | BODY MASS INDEX: 23.81 KG/M2

## 2023-03-02 NOTE — TELEPHONE ENCOUNTER
Please advise the materniti 21 test is negative for down's syndrome, trisomy 18 and 13    And If wants to know sex      It's a girl !!!

## 2023-03-03 ENCOUNTER — PATIENT MESSAGE (OUTPATIENT)
Dept: OBSTETRICS AND GYNECOLOGY | Facility: CLINIC | Age: 38
End: 2023-03-03
Payer: MEDICAID

## 2023-03-08 ENCOUNTER — PATIENT MESSAGE (OUTPATIENT)
Dept: OBSTETRICS AND GYNECOLOGY | Facility: CLINIC | Age: 38
End: 2023-03-08

## 2023-03-08 ENCOUNTER — ROUTINE PRENATAL (OUTPATIENT)
Dept: OBSTETRICS AND GYNECOLOGY | Facility: CLINIC | Age: 38
End: 2023-03-08
Payer: MEDICAID

## 2023-03-08 ENCOUNTER — PROCEDURE VISIT (OUTPATIENT)
Dept: OBSTETRICS AND GYNECOLOGY | Facility: CLINIC | Age: 38
End: 2023-03-08
Payer: MEDICAID

## 2023-03-08 VITALS
BODY MASS INDEX: 24.13 KG/M2 | SYSTOLIC BLOOD PRESSURE: 114 MMHG | WEIGHT: 123.56 LBS | DIASTOLIC BLOOD PRESSURE: 72 MMHG

## 2023-03-08 DIAGNOSIS — O09.522 MULTIGRAVIDA OF ADVANCED MATERNAL AGE IN SECOND TRIMESTER: ICD-10-CM

## 2023-03-08 DIAGNOSIS — O34.32 CERVICAL CERCLAGE SUTURE PRESENT, SECOND TRIMESTER: Primary | ICD-10-CM

## 2023-03-08 DIAGNOSIS — O21.9 NAUSEA AND VOMITING IN PREGNANCY: ICD-10-CM

## 2023-03-08 DIAGNOSIS — O09.291 HISTORY OF INCOMPETENT CERVIX, CURRENTLY PREGNANT IN FIRST TRIMESTER: ICD-10-CM

## 2023-03-08 DIAGNOSIS — N39.0 E-COLI UTI: ICD-10-CM

## 2023-03-08 DIAGNOSIS — Z87.42 HISTORY OF CERVICAL INCOMPETENCE: ICD-10-CM

## 2023-03-08 DIAGNOSIS — B96.20 E-COLI UTI: ICD-10-CM

## 2023-03-08 PROCEDURE — 99214 PR OFFICE/OUTPT VISIT, EST, LEVL IV, 30-39 MIN: ICD-10-PCS | Mod: TH,S$PBB,, | Performed by: MIDWIFE

## 2023-03-08 PROCEDURE — 87086 URINE CULTURE/COLONY COUNT: CPT | Performed by: MIDWIFE

## 2023-03-08 PROCEDURE — 87077 CULTURE AEROBIC IDENTIFY: CPT | Performed by: MIDWIFE

## 2023-03-08 PROCEDURE — 99212 OFFICE O/P EST SF 10 MIN: CPT | Mod: PBBFAC,TH,PN | Performed by: MIDWIFE

## 2023-03-08 PROCEDURE — 99214 OFFICE O/P EST MOD 30 MIN: CPT | Mod: TH,S$PBB,, | Performed by: MIDWIFE

## 2023-03-08 PROCEDURE — 99999 PR PBB SHADOW E&M-EST. PATIENT-LVL II: CPT | Mod: PBBFAC,,, | Performed by: MIDWIFE

## 2023-03-08 PROCEDURE — 76817 US OB/GYN PROCEDURE (VIEWPOINT): ICD-10-PCS | Mod: 26,S$PBB,, | Performed by: OBSTETRICS & GYNECOLOGY

## 2023-03-08 PROCEDURE — 87186 SC STD MICRODIL/AGAR DIL: CPT | Performed by: MIDWIFE

## 2023-03-08 PROCEDURE — 87088 URINE BACTERIA CULTURE: CPT | Performed by: MIDWIFE

## 2023-03-08 PROCEDURE — 76817 TRANSVAGINAL US OBSTETRIC: CPT | Mod: PBBFAC,PN | Performed by: OBSTETRICS & GYNECOLOGY

## 2023-03-08 PROCEDURE — 99999 PR PBB SHADOW E&M-EST. PATIENT-LVL II: ICD-10-PCS | Mod: PBBFAC,,, | Performed by: MIDWIFE

## 2023-03-08 RX ORDER — ASPIRIN 81 MG/1
81 TABLET ORAL DAILY
Qty: 60 TABLET | Refills: 2 | Status: ON HOLD | OUTPATIENT
Start: 2023-03-08 | End: 2023-08-20 | Stop reason: HOSPADM

## 2023-03-08 RX ORDER — PROMETHAZINE HYDROCHLORIDE 25 MG/1
25 TABLET ORAL EVERY 6 HOURS PRN
Qty: 30 TABLET | Refills: 1 | Status: ON HOLD | OUTPATIENT
Start: 2023-03-08 | End: 2023-08-20 | Stop reason: HOSPADM

## 2023-03-08 NOTE — PROGRESS NOTES
38 y.o. female  at 14w2d   Here for f/u following cerclage placement on 3/1/23- Denies VB, LOF or cramping. Compliant with prometrium.    TVUS today. Cerclage visualized and appears in place. Cervix measures 35.1 mm.   UTI GAETANO today  C/o food aversions. Nausea with certain smells. Reassurance provided. Zofran not helping, would like to try something else. Phenergan sent to pharmacy.   TW lbs   Anatomy scan ordered  Begin baby ASA for AMA   Reviewed warning signs, pregnancy precautions and how/when to call.  RTC x 4 wks, call or present sooner prn.

## 2023-03-10 LAB — BACTERIA UR CULT: ABNORMAL

## 2023-03-11 ENCOUNTER — PATIENT MESSAGE (OUTPATIENT)
Dept: OBSTETRICS AND GYNECOLOGY | Facility: CLINIC | Age: 38
End: 2023-03-11
Payer: MEDICAID

## 2023-03-13 DIAGNOSIS — N39.0 E-COLI UTI: Primary | ICD-10-CM

## 2023-03-13 DIAGNOSIS — B96.20 E-COLI UTI: Primary | ICD-10-CM

## 2023-03-13 RX ORDER — NITROFURANTOIN 25; 75 MG/1; MG/1
100 CAPSULE ORAL 2 TIMES DAILY
Qty: 14 CAPSULE | Refills: 0 | Status: SHIPPED | OUTPATIENT
Start: 2023-03-13 | End: 2023-03-21

## 2023-03-21 ENCOUNTER — ROUTINE PRENATAL (OUTPATIENT)
Dept: OBSTETRICS AND GYNECOLOGY | Facility: CLINIC | Age: 38
End: 2023-03-21
Payer: MEDICAID

## 2023-03-21 VITALS
DIASTOLIC BLOOD PRESSURE: 68 MMHG | WEIGHT: 122.81 LBS | SYSTOLIC BLOOD PRESSURE: 100 MMHG | BODY MASS INDEX: 23.98 KG/M2

## 2023-03-21 DIAGNOSIS — K59.00 CONSTIPATION IN PREGNANCY IN SECOND TRIMESTER: Primary | ICD-10-CM

## 2023-03-21 DIAGNOSIS — O99.612 CONSTIPATION IN PREGNANCY IN SECOND TRIMESTER: Primary | ICD-10-CM

## 2023-03-21 PROCEDURE — 99212 OFFICE O/P EST SF 10 MIN: CPT | Mod: PBBFAC,TH,PN | Performed by: MIDWIFE

## 2023-03-21 PROCEDURE — 99999 PR PBB SHADOW E&M-EST. PATIENT-LVL II: CPT | Mod: PBBFAC,,, | Performed by: MIDWIFE

## 2023-03-21 PROCEDURE — 99213 PR OFFICE/OUTPT VISIT, EST, LEVL III, 20-29 MIN: ICD-10-PCS | Mod: TH,S$PBB,, | Performed by: MIDWIFE

## 2023-03-21 PROCEDURE — 99213 OFFICE O/P EST LOW 20 MIN: CPT | Mod: TH,S$PBB,, | Performed by: MIDWIFE

## 2023-03-21 PROCEDURE — 99999 PR PBB SHADOW E&M-EST. PATIENT-LVL II: ICD-10-PCS | Mod: PBBFAC,,, | Performed by: MIDWIFE

## 2023-03-21 RX ORDER — DOCUSATE SODIUM 100 MG/1
100 CAPSULE, LIQUID FILLED ORAL 2 TIMES DAILY
Qty: 60 CAPSULE | Refills: 3 | Status: SHIPPED | OUTPATIENT
Start: 2023-03-21 | End: 2023-03-26 | Stop reason: SDUPTHER

## 2023-03-21 RX ORDER — POLYETHYLENE GLYCOL 3350 17 G/17G
17 POWDER, FOR SOLUTION ORAL DAILY
Qty: 20 PACKET | Refills: 1 | Status: ON HOLD | OUTPATIENT
Start: 2023-03-21 | End: 2023-08-20 | Stop reason: HOSPADM

## 2023-03-21 NOTE — PROGRESS NOTES
38 y.o. female  at 16w1d   Here today with c/o constipation, denies VB or LOF.   Pt reports she was able to make a BM on . Has been eating more fiber and drinking prune juice. It is helping some. Recommendations reviewed about having more bowel movements. Colace and Miralax sent to pharmacy. Fleets enema ok sparingly!  TWG: -1 lb   Reviewed warning signs, pregnancy precautions and how/when to call.  RTC x 3 wks, call or present sooner prn.

## 2023-03-26 ENCOUNTER — PATIENT MESSAGE (OUTPATIENT)
Dept: OBSTETRICS AND GYNECOLOGY | Facility: CLINIC | Age: 38
End: 2023-03-26
Payer: MEDICAID

## 2023-03-26 DIAGNOSIS — Z87.42 HISTORY OF CERVICAL INCOMPETENCE: Primary | ICD-10-CM

## 2023-03-26 DIAGNOSIS — O09.522 MULTIGRAVIDA OF ADVANCED MATERNAL AGE IN SECOND TRIMESTER: ICD-10-CM

## 2023-03-27 ENCOUNTER — PATIENT MESSAGE (OUTPATIENT)
Dept: OTHER | Facility: OTHER | Age: 38
End: 2023-03-27
Payer: MEDICAID

## 2023-03-27 ENCOUNTER — PATIENT MESSAGE (OUTPATIENT)
Dept: OBSTETRICS AND GYNECOLOGY | Facility: CLINIC | Age: 38
End: 2023-03-27
Payer: MEDICAID

## 2023-03-27 RX ORDER — PROGESTERONE 200 MG/1
200 CAPSULE ORAL NIGHTLY
Qty: 30 CAPSULE | Refills: 11 | Status: ON HOLD | OUTPATIENT
Start: 2023-03-27 | End: 2023-08-20 | Stop reason: HOSPADM

## 2023-04-06 ENCOUNTER — ROUTINE PRENATAL (OUTPATIENT)
Dept: OBSTETRICS AND GYNECOLOGY | Facility: CLINIC | Age: 38
End: 2023-04-06
Payer: MEDICAID

## 2023-04-06 VITALS
DIASTOLIC BLOOD PRESSURE: 72 MMHG | SYSTOLIC BLOOD PRESSURE: 112 MMHG | BODY MASS INDEX: 24.43 KG/M2 | WEIGHT: 125.13 LBS

## 2023-04-06 DIAGNOSIS — Z36.89 ENCOUNTER FOR FETAL ANATOMIC SURVEY: ICD-10-CM

## 2023-04-06 DIAGNOSIS — O34.32 CERVICAL CERCLAGE SUTURE PRESENT, SECOND TRIMESTER: Primary | ICD-10-CM

## 2023-04-06 DIAGNOSIS — O09.522 MULTIGRAVIDA OF ADVANCED MATERNAL AGE IN SECOND TRIMESTER: ICD-10-CM

## 2023-04-06 PROCEDURE — 99999 PR PBB SHADOW E&M-EST. PATIENT-LVL II: ICD-10-PCS | Mod: PBBFAC,,, | Performed by: MIDWIFE

## 2023-04-06 PROCEDURE — 99999 PR PBB SHADOW E&M-EST. PATIENT-LVL II: CPT | Mod: PBBFAC,,, | Performed by: MIDWIFE

## 2023-04-06 PROCEDURE — 99212 OFFICE O/P EST SF 10 MIN: CPT | Mod: PBBFAC,TH,PN | Performed by: MIDWIFE

## 2023-04-06 PROCEDURE — 99213 PR OFFICE/OUTPT VISIT, EST, LEVL III, 20-29 MIN: ICD-10-PCS | Mod: TH,S$PBB,, | Performed by: MIDWIFE

## 2023-04-06 PROCEDURE — 99213 OFFICE O/P EST LOW 20 MIN: CPT | Mod: TH,S$PBB,, | Performed by: MIDWIFE

## 2023-04-06 NOTE — PROGRESS NOTES
38 y.o. female  at 18w3d   Feeling flutters, denies VB, LOF or cramping  Doing well without concerns. Constipation has improved some but still exists.   TW.5 lbs   Anatomy scan ordered  Reviewed warning signs, pregnancy precautions and how/when to call.  RTC x 2 wks, call or present sooner prn.

## 2023-04-17 ENCOUNTER — PATIENT MESSAGE (OUTPATIENT)
Dept: OTHER | Facility: OTHER | Age: 38
End: 2023-04-17
Payer: MEDICAID

## 2023-04-24 ENCOUNTER — PATIENT MESSAGE (OUTPATIENT)
Dept: OBSTETRICS AND GYNECOLOGY | Facility: CLINIC | Age: 38
End: 2023-04-24
Payer: MEDICAID

## 2023-04-25 ENCOUNTER — PROCEDURE VISIT (OUTPATIENT)
Dept: OBSTETRICS AND GYNECOLOGY | Facility: CLINIC | Age: 38
End: 2023-04-25
Payer: MEDICAID

## 2023-04-25 ENCOUNTER — ROUTINE PRENATAL (OUTPATIENT)
Dept: OBSTETRICS AND GYNECOLOGY | Facility: CLINIC | Age: 38
End: 2023-04-25
Payer: MEDICAID

## 2023-04-25 VITALS
BODY MASS INDEX: 24.71 KG/M2 | WEIGHT: 126.56 LBS | SYSTOLIC BLOOD PRESSURE: 108 MMHG | DIASTOLIC BLOOD PRESSURE: 64 MMHG

## 2023-04-25 DIAGNOSIS — Z36.2 ENCOUNTER FOR FOLLOW-UP ULTRASOUND OF FETAL ANATOMY: ICD-10-CM

## 2023-04-25 DIAGNOSIS — N39.0 E-COLI UTI: ICD-10-CM

## 2023-04-25 DIAGNOSIS — B96.20 E-COLI UTI: ICD-10-CM

## 2023-04-25 DIAGNOSIS — O34.32 CERVICAL CERCLAGE SUTURE PRESENT, SECOND TRIMESTER: Primary | ICD-10-CM

## 2023-04-25 DIAGNOSIS — O09.522 MULTIGRAVIDA OF ADVANCED MATERNAL AGE IN SECOND TRIMESTER: ICD-10-CM

## 2023-04-25 DIAGNOSIS — Z36.89 ENCOUNTER FOR FETAL ANATOMIC SURVEY: ICD-10-CM

## 2023-04-25 PROCEDURE — 99999 PR PBB SHADOW E&M-EST. PATIENT-LVL II: ICD-10-PCS | Mod: PBBFAC,,, | Performed by: MIDWIFE

## 2023-04-25 PROCEDURE — 99999 PR PBB SHADOW E&M-EST. PATIENT-LVL II: CPT | Mod: PBBFAC,,, | Performed by: MIDWIFE

## 2023-04-25 PROCEDURE — 87088 URINE BACTERIA CULTURE: CPT | Performed by: MIDWIFE

## 2023-04-25 PROCEDURE — 76811 US OB/GYN PROCEDURE (VIEWPOINT): ICD-10-PCS | Mod: 26,S$PBB,, | Performed by: OBSTETRICS & GYNECOLOGY

## 2023-04-25 PROCEDURE — 76811 OB US DETAILED SNGL FETUS: CPT | Mod: PBBFAC,PN | Performed by: OBSTETRICS & GYNECOLOGY

## 2023-04-25 PROCEDURE — 87086 URINE CULTURE/COLONY COUNT: CPT | Performed by: MIDWIFE

## 2023-04-25 PROCEDURE — 99212 OFFICE O/P EST SF 10 MIN: CPT | Mod: PBBFAC,TH,PN,25 | Performed by: MIDWIFE

## 2023-04-25 PROCEDURE — 99213 PR OFFICE/OUTPT VISIT, EST, LEVL III, 20-29 MIN: ICD-10-PCS | Mod: TH,S$PBB,, | Performed by: MIDWIFE

## 2023-04-25 PROCEDURE — 87077 CULTURE AEROBIC IDENTIFY: CPT | Performed by: MIDWIFE

## 2023-04-25 PROCEDURE — 99213 OFFICE O/P EST LOW 20 MIN: CPT | Mod: TH,S$PBB,, | Performed by: MIDWIFE

## 2023-04-25 PROCEDURE — 87186 SC STD MICRODIL/AGAR DIL: CPT | Performed by: MIDWIFE

## 2023-04-25 NOTE — PROGRESS NOTES
38 y.o. female  at 21w1d   Feeling flutters/FM, denies VB, LOF or cramping  Doing well without concerns. Voices no complaints.   TWG: 3 lbs   Reviewed anatomy US, transverse, posterior placenta, normal cord insertion, 3vc, ROBE wnl, EFW 18%ile, 0lb 13oz, cervical length 33.3mm, heart and extremities suboptimal-- will f/u in 4 weeks with repeat scan   UTI GAETANO today   Reviewed warning signs, normal FM,  labor precautions and how/when to call.  RTC x 4 wks, call or present sooner prn.

## 2023-04-27 LAB — BACTERIA UR CULT: ABNORMAL

## 2023-05-01 ENCOUNTER — PATIENT MESSAGE (OUTPATIENT)
Dept: OBSTETRICS AND GYNECOLOGY | Facility: CLINIC | Age: 38
End: 2023-05-01
Payer: MEDICAID

## 2023-05-01 DIAGNOSIS — B96.20 E-COLI UTI: ICD-10-CM

## 2023-05-01 DIAGNOSIS — N39.0 E-COLI UTI: ICD-10-CM

## 2023-05-01 RX ORDER — NITROFURANTOIN 25; 75 MG/1; MG/1
100 CAPSULE ORAL 2 TIMES DAILY
Qty: 20 CAPSULE | Refills: 0 | Status: SHIPPED | OUTPATIENT
Start: 2023-05-01 | End: 2023-05-11

## 2023-05-02 ENCOUNTER — PATIENT MESSAGE (OUTPATIENT)
Dept: OBSTETRICS AND GYNECOLOGY | Facility: CLINIC | Age: 38
End: 2023-05-02
Payer: MEDICAID

## 2023-05-15 ENCOUNTER — PATIENT MESSAGE (OUTPATIENT)
Dept: OTHER | Facility: OTHER | Age: 38
End: 2023-05-15
Payer: MEDICAID

## 2023-05-23 ENCOUNTER — PROCEDURE VISIT (OUTPATIENT)
Dept: OBSTETRICS AND GYNECOLOGY | Facility: CLINIC | Age: 38
End: 2023-05-23
Payer: MEDICAID

## 2023-05-23 ENCOUNTER — ROUTINE PRENATAL (OUTPATIENT)
Dept: OBSTETRICS AND GYNECOLOGY | Facility: CLINIC | Age: 38
End: 2023-05-23
Payer: MEDICAID

## 2023-05-23 VITALS
BODY MASS INDEX: 25.73 KG/M2 | SYSTOLIC BLOOD PRESSURE: 110 MMHG | DIASTOLIC BLOOD PRESSURE: 64 MMHG | WEIGHT: 131.75 LBS

## 2023-05-23 DIAGNOSIS — Z36.2 ENCOUNTER FOR FOLLOW-UP ULTRASOUND OF FETAL ANATOMY: ICD-10-CM

## 2023-05-23 DIAGNOSIS — O34.32 CERVICAL CERCLAGE SUTURE PRESENT, SECOND TRIMESTER: Primary | ICD-10-CM

## 2023-05-23 DIAGNOSIS — O09.522 MULTIGRAVIDA OF ADVANCED MATERNAL AGE IN SECOND TRIMESTER: ICD-10-CM

## 2023-05-23 PROCEDURE — 99213 OFFICE O/P EST LOW 20 MIN: CPT | Mod: TH,S$PBB,, | Performed by: MIDWIFE

## 2023-05-23 PROCEDURE — 99999 PR PBB SHADOW E&M-EST. PATIENT-LVL II: CPT | Mod: PBBFAC,,, | Performed by: MIDWIFE

## 2023-05-23 PROCEDURE — 99212 OFFICE O/P EST SF 10 MIN: CPT | Mod: PBBFAC,TH,PN | Performed by: MIDWIFE

## 2023-05-23 PROCEDURE — 99213 PR OFFICE/OUTPT VISIT, EST, LEVL III, 20-29 MIN: ICD-10-PCS | Mod: TH,S$PBB,, | Performed by: MIDWIFE

## 2023-05-23 PROCEDURE — 76816 US OB/GYN PROCEDURE (VIEWPOINT): ICD-10-PCS | Mod: 26,S$PBB,, | Performed by: OBSTETRICS & GYNECOLOGY

## 2023-05-23 PROCEDURE — 76816 OB US FOLLOW-UP PER FETUS: CPT | Mod: PBBFAC,PN | Performed by: OBSTETRICS & GYNECOLOGY

## 2023-05-23 PROCEDURE — 99999 PR PBB SHADOW E&M-EST. PATIENT-LVL II: ICD-10-PCS | Mod: PBBFAC,,, | Performed by: MIDWIFE

## 2023-05-23 NOTE — PROGRESS NOTES
38 y.o. female  at 25w1d   Reports + FM, denies VB, LOF, or cramping  Doing well without concerns.   TW lbs   Reviewed upcoming 28wk labs, (AB POS) and orders placed  F/u anatomy scan today, cephalic presentation, Rebecca wnl, EFW [id number], 36%ile, all views seen, will await final impression and review by MFM    Cervical cerclage suture present, second trimester    Multigravida of advanced maternal age in second trimester       Reviewed warning signs, normal FM,  labor precautions and how/when to call.  RTC x 4 wks, call or present sooner prn.

## 2023-05-29 ENCOUNTER — PATIENT MESSAGE (OUTPATIENT)
Dept: OTHER | Facility: OTHER | Age: 38
End: 2023-05-29
Payer: MEDICAID

## 2023-06-12 ENCOUNTER — PATIENT MESSAGE (OUTPATIENT)
Dept: OTHER | Facility: OTHER | Age: 38
End: 2023-06-12
Payer: MEDICAID

## 2023-06-22 ENCOUNTER — LAB VISIT (OUTPATIENT)
Dept: LAB | Facility: HOSPITAL | Age: 38
End: 2023-06-22
Attending: MIDWIFE
Payer: MEDICAID

## 2023-06-22 ENCOUNTER — ROUTINE PRENATAL (OUTPATIENT)
Dept: OBSTETRICS AND GYNECOLOGY | Facility: CLINIC | Age: 38
End: 2023-06-22
Payer: MEDICAID

## 2023-06-22 VITALS
SYSTOLIC BLOOD PRESSURE: 110 MMHG | DIASTOLIC BLOOD PRESSURE: 74 MMHG | WEIGHT: 135.13 LBS | BODY MASS INDEX: 26.39 KG/M2

## 2023-06-22 DIAGNOSIS — O09.522 MULTIGRAVIDA OF ADVANCED MATERNAL AGE IN SECOND TRIMESTER: ICD-10-CM

## 2023-06-22 DIAGNOSIS — Z23 NEED FOR TDAP VACCINATION: Primary | ICD-10-CM

## 2023-06-22 DIAGNOSIS — O34.32 CERVICAL CERCLAGE SUTURE PRESENT, SECOND TRIMESTER: ICD-10-CM

## 2023-06-22 PROBLEM — O34.33 CERVICAL CERCLAGE SUTURE PRESENT IN THIRD TRIMESTER: Status: ACTIVE | Noted: 2018-07-11

## 2023-06-22 LAB
BASOPHILS # BLD AUTO: 0.01 K/UL (ref 0–0.2)
BASOPHILS NFR BLD: 0.1 % (ref 0–1.9)
DIFFERENTIAL METHOD: ABNORMAL
EOSINOPHIL # BLD AUTO: 0 K/UL (ref 0–0.5)
EOSINOPHIL NFR BLD: 0.1 % (ref 0–8)
ERYTHROCYTE [DISTWIDTH] IN BLOOD BY AUTOMATED COUNT: 13.2 % (ref 11.5–14.5)
GLUCOSE SERPL-MCNC: 156 MG/DL (ref 70–140)
HCT VFR BLD AUTO: 32.8 % (ref 37–48.5)
HGB BLD-MCNC: 11.3 G/DL (ref 12–16)
HIV 1+2 AB+HIV1 P24 AG SERPL QL IA: NORMAL
IMM GRANULOCYTES # BLD AUTO: 0.05 K/UL (ref 0–0.04)
IMM GRANULOCYTES NFR BLD AUTO: 0.7 % (ref 0–0.5)
LYMPHOCYTES # BLD AUTO: 0.6 K/UL (ref 1–4.8)
LYMPHOCYTES NFR BLD: 8.6 % (ref 18–48)
MCH RBC QN AUTO: 34.2 PG (ref 27–31)
MCHC RBC AUTO-ENTMCNC: 34.5 G/DL (ref 32–36)
MCV RBC AUTO: 99 FL (ref 82–98)
MONOCYTES # BLD AUTO: 0.2 K/UL (ref 0.3–1)
MONOCYTES NFR BLD: 2.9 % (ref 4–15)
NEUTROPHILS # BLD AUTO: 6.3 K/UL (ref 1.8–7.7)
NEUTROPHILS NFR BLD: 87.6 % (ref 38–73)
NRBC BLD-RTO: 0 /100 WBC
PLATELET # BLD AUTO: 137 K/UL (ref 150–450)
PMV BLD AUTO: 10.4 FL (ref 9.2–12.9)
RBC # BLD AUTO: 3.3 M/UL (ref 4–5.4)
WBC # BLD AUTO: 7.22 K/UL (ref 3.9–12.7)

## 2023-06-22 PROCEDURE — 85025 COMPLETE CBC W/AUTO DIFF WBC: CPT | Performed by: MIDWIFE

## 2023-06-22 PROCEDURE — 90471 IMMUNIZATION ADMIN: CPT | Mod: PBBFAC,PN

## 2023-06-22 PROCEDURE — 86592 SYPHILIS TEST NON-TREP QUAL: CPT | Performed by: MIDWIFE

## 2023-06-22 PROCEDURE — 99213 PR OFFICE/OUTPT VISIT, EST, LEVL III, 20-29 MIN: ICD-10-PCS | Mod: TH,S$PBB,, | Performed by: MIDWIFE

## 2023-06-22 PROCEDURE — 82950 GLUCOSE TEST: CPT | Performed by: MIDWIFE

## 2023-06-22 PROCEDURE — 99213 OFFICE O/P EST LOW 20 MIN: CPT | Mod: TH,S$PBB,, | Performed by: MIDWIFE

## 2023-06-22 PROCEDURE — 99999 PR PBB SHADOW E&M-EST. PATIENT-LVL II: CPT | Mod: PBBFAC,,, | Performed by: MIDWIFE

## 2023-06-22 PROCEDURE — 36415 COLL VENOUS BLD VENIPUNCTURE: CPT | Mod: PO | Performed by: MIDWIFE

## 2023-06-22 PROCEDURE — 90715 TDAP VACCINE 7 YRS/> IM: CPT | Mod: PBBFAC,PN

## 2023-06-22 PROCEDURE — 99999 PR PBB SHADOW E&M-EST. PATIENT-LVL II: ICD-10-PCS | Mod: PBBFAC,,, | Performed by: MIDWIFE

## 2023-06-22 PROCEDURE — 87389 HIV-1 AG W/HIV-1&-2 AB AG IA: CPT | Performed by: MIDWIFE

## 2023-06-22 PROCEDURE — 99212 OFFICE O/P EST SF 10 MIN: CPT | Mod: PBBFAC,TH,PN | Performed by: MIDWIFE

## 2023-06-22 NOTE — PROGRESS NOTES
38 y.o. female  at 29w3d   Reports + FM, denies VB, LOF or CTX  Doing well without concerns.   TW.5 lbs   28wk labs today (AB POS)  Tdap today  Will start visits every 2 weeks    Need for Tdap vaccination  -     (In Office Administered) Tdap Vaccine    Multigravida of advanced maternal age in third trimester     Cervical cerclage suture present in third trimester  -no problems    Reviewed warning signs, normal FKCs,  labor precautions and how/when to call.  RTC x 2 wks, call or present sooner prn.

## 2023-06-22 NOTE — PROGRESS NOTES
After identifying patient with 2 identifiers, verifying that patient wished to receive Tdap, reviewing allergies, 0.5 ml Tdap administered to right deltoid.   Patient tolerated well.  Patient instructed to wait 15 minutes and verbalized understanding.

## 2023-06-23 ENCOUNTER — PATIENT MESSAGE (OUTPATIENT)
Dept: OBSTETRICS AND GYNECOLOGY | Facility: CLINIC | Age: 38
End: 2023-06-23
Payer: MEDICAID

## 2023-06-23 DIAGNOSIS — O99.810 ABNORMAL GLUCOSE TOLERANCE IN PREGNANCY: Primary | ICD-10-CM

## 2023-06-23 LAB — RPR SER QL: NORMAL

## 2023-06-26 ENCOUNTER — PATIENT MESSAGE (OUTPATIENT)
Dept: OTHER | Facility: OTHER | Age: 38
End: 2023-06-26
Payer: MEDICAID

## 2023-06-28 ENCOUNTER — LAB VISIT (OUTPATIENT)
Dept: LAB | Facility: HOSPITAL | Age: 38
End: 2023-06-28
Attending: MIDWIFE
Payer: MEDICAID

## 2023-06-28 DIAGNOSIS — O99.810 ABNORMAL GLUCOSE TOLERANCE IN PREGNANCY: ICD-10-CM

## 2023-06-28 LAB
GLUCOSE SERPL-MCNC: 143 MG/DL
GLUCOSE SERPL-MCNC: 150 MG/DL
GLUCOSE SERPL-MCNC: 193 MG/DL
GLUCOSE SERPL-MCNC: 83 MG/DL (ref 70–110)

## 2023-06-28 PROCEDURE — 82951 GLUCOSE TOLERANCE TEST (GTT): CPT | Performed by: MIDWIFE

## 2023-06-28 PROCEDURE — 36415 COLL VENOUS BLD VENIPUNCTURE: CPT | Mod: PO | Performed by: MIDWIFE

## 2023-06-29 ENCOUNTER — PATIENT MESSAGE (OUTPATIENT)
Dept: OBSTETRICS AND GYNECOLOGY | Facility: CLINIC | Age: 38
End: 2023-06-29
Payer: MEDICAID

## 2023-06-29 DIAGNOSIS — O24.410 DIET CONTROLLED GESTATIONAL DIABETES MELLITUS (GDM) IN THIRD TRIMESTER: Primary | ICD-10-CM

## 2023-06-29 RX ORDER — INSULIN PUMP SYRINGE, 3 ML
EACH MISCELLANEOUS
Qty: 1 EACH | Refills: 0 | Status: ON HOLD | OUTPATIENT
Start: 2023-06-29 | End: 2023-08-20 | Stop reason: HOSPADM

## 2023-06-29 RX ORDER — LANCETS
EACH MISCELLANEOUS
Qty: 200 EACH | Refills: 1 | Status: ON HOLD | OUTPATIENT
Start: 2023-06-29 | End: 2023-08-20 | Stop reason: HOSPADM

## 2023-07-03 ENCOUNTER — PATIENT MESSAGE (OUTPATIENT)
Dept: OBSTETRICS AND GYNECOLOGY | Facility: CLINIC | Age: 38
End: 2023-07-03
Payer: MEDICAID

## 2023-07-05 ENCOUNTER — ROUTINE PRENATAL (OUTPATIENT)
Dept: OBSTETRICS AND GYNECOLOGY | Facility: CLINIC | Age: 38
End: 2023-07-05
Payer: MEDICAID

## 2023-07-05 ENCOUNTER — PATIENT MESSAGE (OUTPATIENT)
Dept: OBSTETRICS AND GYNECOLOGY | Facility: CLINIC | Age: 38
End: 2023-07-05

## 2023-07-05 VITALS — DIASTOLIC BLOOD PRESSURE: 68 MMHG | WEIGHT: 136 LBS | BODY MASS INDEX: 26.57 KG/M2 | SYSTOLIC BLOOD PRESSURE: 108 MMHG

## 2023-07-05 DIAGNOSIS — N30.00 ACUTE CYSTITIS WITHOUT HEMATURIA: ICD-10-CM

## 2023-07-05 DIAGNOSIS — O09.522 MULTIGRAVIDA OF ADVANCED MATERNAL AGE IN SECOND TRIMESTER: ICD-10-CM

## 2023-07-05 DIAGNOSIS — O24.410 DIET CONTROLLED GESTATIONAL DIABETES MELLITUS (GDM) IN THIRD TRIMESTER: ICD-10-CM

## 2023-07-05 DIAGNOSIS — R82.90 FOUL SMELLING URINE: ICD-10-CM

## 2023-07-05 DIAGNOSIS — O34.33 CERVICAL CERCLAGE SUTURE PRESENT IN THIRD TRIMESTER: ICD-10-CM

## 2023-07-05 DIAGNOSIS — Z36.89 ENCOUNTER FOR ULTRASOUND TO ASSESS FETAL GROWTH: ICD-10-CM

## 2023-07-05 DIAGNOSIS — R39.9 URINARY TRACT INFECTION SYMPTOMS: Primary | ICD-10-CM

## 2023-07-05 LAB
BACTERIA #/AREA URNS AUTO: ABNORMAL /HPF
BILIRUB SERPL-MCNC: NORMAL MG/DL
BILIRUB UR QL STRIP: NEGATIVE
BLOOD URINE, POC: NORMAL
CLARITY UR REFRACT.AUTO: ABNORMAL
CLARITY, POC UA: NORMAL
COLOR UR AUTO: YELLOW
COLOR, POC UA: NORMAL
GLUCOSE UR QL STRIP: NEGATIVE
GLUCOSE UR QL STRIP: NORMAL
HGB UR QL STRIP: NEGATIVE
KETONES UR QL STRIP: NEGATIVE
KETONES UR QL STRIP: NORMAL
LEUKOCYTE ESTERASE UR QL STRIP: ABNORMAL
LEUKOCYTE ESTERASE URINE, POC: NORMAL
MICROSCOPIC COMMENT: ABNORMAL
NITRITE UR QL STRIP: NEGATIVE
NITRITE, POC UA: POSITIVE
PH UR STRIP: 7 [PH] (ref 5–8)
PH, POC UA: 8
PROT UR QL STRIP: NEGATIVE
PROTEIN, POC: NORMAL
SP GR UR STRIP: 1.01 (ref 1–1.03)
SPECIFIC GRAVITY, POC UA: 1.01
SQUAMOUS #/AREA URNS AUTO: 5 /HPF
URN SPEC COLLECT METH UR: ABNORMAL
UROBILINOGEN, POC UA: 8
WBC #/AREA URNS AUTO: 1 /HPF (ref 0–5)

## 2023-07-05 PROCEDURE — 99213 OFFICE O/P EST LOW 20 MIN: CPT | Mod: PBBFAC,TH,PN | Performed by: MIDWIFE

## 2023-07-05 PROCEDURE — 81002 URINALYSIS NONAUTO W/O SCOPE: CPT | Mod: PBBFAC,PN | Performed by: MIDWIFE

## 2023-07-05 PROCEDURE — 99214 PR OFFICE/OUTPT VISIT, EST, LEVL IV, 30-39 MIN: ICD-10-PCS | Mod: TH,S$PBB,, | Performed by: MIDWIFE

## 2023-07-05 PROCEDURE — 81001 URINALYSIS AUTO W/SCOPE: CPT | Performed by: MIDWIFE

## 2023-07-05 PROCEDURE — 99999 PR PBB SHADOW E&M-EST. PATIENT-LVL III: CPT | Mod: PBBFAC,,, | Performed by: MIDWIFE

## 2023-07-05 PROCEDURE — 99999 PR PBB SHADOW E&M-EST. PATIENT-LVL III: ICD-10-PCS | Mod: PBBFAC,,, | Performed by: MIDWIFE

## 2023-07-05 PROCEDURE — 99214 OFFICE O/P EST MOD 30 MIN: CPT | Mod: TH,S$PBB,, | Performed by: MIDWIFE

## 2023-07-05 RX ORDER — NITROFURANTOIN 25; 75 MG/1; MG/1
100 CAPSULE ORAL 2 TIMES DAILY
Qty: 14 CAPSULE | Refills: 0 | Status: SHIPPED | OUTPATIENT
Start: 2023-07-05 | End: 2023-07-12

## 2023-07-06 ENCOUNTER — CLINICAL SUPPORT (OUTPATIENT)
Dept: DIABETES | Facility: CLINIC | Age: 38
End: 2023-07-06
Payer: MEDICAID

## 2023-07-06 DIAGNOSIS — O24.410 DIET CONTROLLED GESTATIONAL DIABETES MELLITUS (GDM) IN THIRD TRIMESTER: Primary | ICD-10-CM

## 2023-07-06 PROCEDURE — 98960 PR SELF-MGMT EDUC & TRAIN, 1 PT, EA 30 MIN: ICD-10-PCS | Mod: 95,,, | Performed by: DIETITIAN, REGISTERED

## 2023-07-06 PROCEDURE — 98960 EDU&TRN PT SELF-MGMT NQHP 1: CPT | Mod: 95,,, | Performed by: DIETITIAN, REGISTERED

## 2023-07-06 NOTE — PROGRESS NOTES
Diabetes Care Specialist Progress Note  Author: Melinda Mustafa RD, CDE  Date: 2023      Diabetes Care Specialist Virtual Visit Note       The patient location is: home in [place]  The chief complaint leading to consultation is: Diabetes  Visit type: audiovisual  Total time spent with patient: 30 min   Each patient to whom he or she provides medical services by telemedicine is:  (1) informed of the relationship between the physician and patient and the respective role of any other health care provider with respect to management of the patient; and (2) notified that he or she may decline to receive medical services by telemedicine and may withdraw from such care at any time.    Program Intake  Reason for Diabetes Program Visit:: Initial Diabetes Assessment  Current diabetes risk level:: high  In the last 12 months, have you:: none  Permission to speak with others about care:: no    Patient is currently 31w3d gestation with an HEATH of 2023.     3 hour OGTT results:   Fastin mg/dl  1 hour: 193 mg/dl (H)  2 hour: 150 mg/dl  3 hour: 143 (H)    Lab Results   Component Value Date    HGBA1C 4.9 2023       Clinical       Problem Review  Reviewed Problem List with Patient: yes  Active comorbidities affecting diabetes self-care.: no    Clinical Assessment  Current Diabetes Treatment: Diet  Have you ever experienced hypoglycemia (low blood sugar)?: no  Have you ever experienced hyperglycemia (high blood sugar)?: no       Labs  Do you have regular lab work to monitor your medications?: Yes  Type of Regular Lab Work: Other (OGTT, OB screen)  Where do you get your labs drawn?: [facility]  Lab Compliance Barriers: No    Nutritional Status  Diet: Regular  Meal Plan 24 Hour Recall: Breakfast, Lunch, Dinner  Meal Plan 24 Hour Recall - Breakfast: quick oatmeal, toast, water (PPB mg/dl)  Meal Plan 24 Hour Recall - Lunch: 2 wheat ham sandwiches light adams and mustard, water (PPB mg/dl)  Meal Plan 24 Hour  Recall - Dinner: 1 slice of pizza with avocado and cheese, water (PPB)  Change in appetite?: No  Dentation:: Intact  Current nutritional status an area of need that is impacting patient's ability to self-manage diabetes?: No    Additional Social History    Support  Does anyone support you with your diabetes care?: yes  Who supports you?: self, spouse  Who takes you to your medical appointments?: self  Does the current support meet the patient's needs?: Yes  Is Support an area impacting ability to self-manage diabetes?: No    Access to Mass Media & Technology  Does the patient have access to any of the following devices or technologies?: Smart phone  Media or technology needs impacting ability to self-manage diabetes?: No    Cognitive/Behavioral Health  Alert and Oriented: Yes  Difficulty Thinking: No  Requires Prompting: No  Requires assistance for routine expression?: No  Cognitive or behavioral barriers impacting ability to self-manage diabetes?: No    Culture/Tenriism  Culture or Oriental orthodox beliefs that may impact ability to access healthcare: No    Communication  Language preference: English  Hearing Problems: No  Vision Problems: Yes  Vision problem type:: Decreased Vision  Vision Assistance: Glasses  Communication needs impacting ability to self-manage diabetes?: No         Diabetes Self-Management Skills Assessment    Diabetes Disease Process/Treatment Options  Patient/caregiver able to state what happens when someone has diabetes.: yes  Patient/caregiver knows what type of diabetes they have.: yes  Diabetes Type : Gestational  Patient/caregiver able to identify at least three signs and symptoms of diabetes.: no  Patient able to identify at least three risk factors for diabetes.: no  Diabetes Disease Process/Treatment Options: Skills Assessment Completed: Yes  Assessment indicates:: Adequate understanding  Area of need?: No    Nutrition/Healthy Eating  Method of carbohydrate measurement:: measuring  cups/spoons  Patient can identify foods that impact blood sugar.: yes  Patient-identified foods:: soda  Nutrition/Healthy Eating Skills Assessment Completed:: Yes  Assessment indicates:: Instruction Needed  Area of need?: Yes    Physical Activity/Exercise  Patient's daily activity level:: lightly active  Patient formally exercises outside of work.: yes  Exercise Type: walking  Physical Activity/Exercise Skills Assessment Completed: : Yes  Assessment indicates:: Adequate understanding  Area of need?: No    Medications  Patient is able to describe current diabetes management routine.: yes  Diabetes management routine:: diet  Medication Skills Assessment Completed:: Yes  Assessment indicates:: Adequate understanding  Area of need?: No    Home Blood Glucose Monitoring  Patient states that blood sugar is checked at home daily.: yes  Monitoring Method:: home glucometer  How often do you check your blood sugar?: 4 times a day  When do you check your blood sugar?: Before breakfast, 2 hours after meal  When you check what is your typical blood sugar range? : FB-95 // PPB-160  Blood glucose logs:: no  Home Blood Glucose Monitoring Skills Assessment Completed: : Yes  Assessment indicates:: Instruction Needed  Area of need?: Yes    Acute Complications  Patient is able to identify types of acute complications: No  Acute Complications Skills Assessment Completed: : Yes  Assessment indicates:: Instruction Needed  Area of need?: No           Assessment Summary and Plan    Based on today's diabetes care assessment, the following areas of need were identified:      Social 2023   Support No   Access to Mass Media/Tech No   Cognitive/Behavioral Health No   Culture/Jain No   Communication No        Clinical 2023   Lab Compliance No   Nutritional Status No        Diabetes Self-Management Skills 2023   Diabetes Disease Process/Treatment Options Educated on patho of Gestational diabetes and importance of good BG  control for health of mother and baby.    Nutrition/Healthy Eating Yes- see care plan.   Patient has already made changes in diet, no longer drinking sugary drinks, only water. Was provided list of recommended foods and foods to avoid by OB.   Emailed Gestational Diabetes Management Guide, Food lists and list of appropriate low carb snacks.   Recommended patient always pair carbohydrate serving with lean protein or healthy fat.    Physical Activity/Exercise Patient walks daily and is active with her children. Educated on benefits of exercise.    Medication Diet controlled, no medications.    Home Blood Glucose Monitoring Yes- see care plan.   FBG readings WNL. Some PPBG was elevated.   Acute Complications Reviewed blood glucose goals, prevention, detection, signs and symptoms, and treatment of hypoglycemia and hyperglycemia.           Today's interventions were provided through individual discussion, instruction, and written materials were provided.      Patient verbalized understanding of instruction and written materials.  Pt was able to return back demonstration of instructions today. Patient understood key points, needs reinforcement and further instruction.     Diabetes Self-Management Care Plan:    Today's Diabetes Self-Management Care Plan was developed with Ebony's input. Ebony has agreed to work toward the following goal(s) to improve his/her overall diabetes control.      Care Plan: Diabetes Management   Updates made since 6/6/2023 12:00 AM        Problem: Healthy Eating         Goal: Eat 3 meals daily with 15-60g/1-4 servings of Carbohydrate per meal and 15-30g Carbohydrate per snack as tolerated.    Start Date: 7/6/2023   Expected End Date: 9/23/2023   Priority: High   Barriers: No Barriers Identified        Task: Reviewed the sources and role of Carbohydrate, Protein, and Fat and how each nutrient impacts blood sugar. Completed 7/6/2023        Task: Provided visual examples using dry measuring cups,  food models, and other familiar objects such as computer mouse, deck or cards, tennis ball etc. to help with visualization of portions. Completed 7/6/2023        Task: Review the importance of balancing carbohydrates with each meal using portion control techniques to count servings of carbohydrate and label reading to identify serving size and amount of total carbs per serving. Completed 7/6/2023        Task: Provided Sample plate method and reviewed the use of the plate to estimate amounts of carbohydrate per meal. Completed 7/6/2023        Problem: Blood Glucose Self-Monitoring         Goal: Patient will continue to check blood sugars 4 times daily.    Start Date: 7/6/2023   Expected End Date: 9/23/2023   Priority: Medium   Barriers: No Barriers Identified        Task: Reviewed the importance of self-monitoring blood glucose and keeping logs. Completed 7/6/2023        Task: Reviewed appropriate timing and frequency to SMBG, goals for FBG and PPBG, and advised patient to bring logs to all appts with PCP/Endocrinologist/Diabetes Care Specialist. Completed 7/6/2023          Follow Up Plan     Follow up in about 3 weeks (around 7/27/2023) for O24.410.    Today's care plan and follow up schedule was discussed with patient.  Ebony verbalized understanding of the care plan, goals, and agrees to follow up plan.        The patient was encouraged to communicate with his/her health care provider/physician and care team regarding his/her condition(s) and treatment.  I provided the patient with my contact information today and encouraged to contact me via phone or [facility] Patient Portal as needed.     Length of Visit   Total Time: 30 Minutes

## 2023-07-10 ENCOUNTER — PATIENT MESSAGE (OUTPATIENT)
Dept: OTHER | Facility: OTHER | Age: 38
End: 2023-07-10
Payer: MEDICAID

## 2023-07-10 NOTE — PROGRESS NOTES
38 y.o. female  at 31w2d   Reports + FM, denies VB, LOF or CTX  C/o UTI symptoms- urine dipstick performed, +nitrites, leukocytes, and protein. Macrobid sent to pharmacy, UA reflex to culture sent.   TW lbs   Reviewed 28wk lab results (AB POS), failed 3 hr gtt- GDM diagnosed    Diet controlled gestational diabetes mellitus (GDM) in third trimester  - has been checking glucoses for the past 6 days. Has logs with her today and they were reviewed:  Fastings:   2 hour PP:   Diet and glucose goals reviewed, has upcoming apt with diabetes education  - Growth scans    Multigravida of advanced maternal age in second trimester    Cervical cerclage suture present in third trimester  - no complications  -revomal 36-37w      Reviewed warning signs, normal FKCs,  labor precautions and how/when to call.  RTC x 2 wks, call or present sooner prn.

## 2023-07-14 ENCOUNTER — ROUTINE PRENATAL (OUTPATIENT)
Dept: OBSTETRICS AND GYNECOLOGY | Facility: CLINIC | Age: 38
End: 2023-07-14
Payer: MEDICAID

## 2023-07-14 ENCOUNTER — PATIENT MESSAGE (OUTPATIENT)
Dept: OBSTETRICS AND GYNECOLOGY | Facility: CLINIC | Age: 38
End: 2023-07-14

## 2023-07-14 ENCOUNTER — PROCEDURE VISIT (OUTPATIENT)
Dept: OBSTETRICS AND GYNECOLOGY | Facility: CLINIC | Age: 38
End: 2023-07-14
Payer: MEDICAID

## 2023-07-14 VITALS
SYSTOLIC BLOOD PRESSURE: 102 MMHG | BODY MASS INDEX: 26.44 KG/M2 | WEIGHT: 135.38 LBS | DIASTOLIC BLOOD PRESSURE: 70 MMHG

## 2023-07-14 DIAGNOSIS — O24.410 DIET CONTROLLED GESTATIONAL DIABETES MELLITUS (GDM) IN THIRD TRIMESTER: Primary | ICD-10-CM

## 2023-07-14 DIAGNOSIS — O09.523 MULTIGRAVIDA OF ADVANCED MATERNAL AGE IN THIRD TRIMESTER: ICD-10-CM

## 2023-07-14 DIAGNOSIS — Z36.89 ENCOUNTER FOR ULTRASOUND TO ASSESS FETAL GROWTH: ICD-10-CM

## 2023-07-14 DIAGNOSIS — O34.33 CERVICAL CERCLAGE SUTURE PRESENT IN THIRD TRIMESTER: ICD-10-CM

## 2023-07-14 PROCEDURE — 76816 US OB/GYN PROCEDURE (VIEWPOINT): ICD-10-PCS | Mod: 26,S$PBB,, | Performed by: OBSTETRICS & GYNECOLOGY

## 2023-07-14 PROCEDURE — 76816 OB US FOLLOW-UP PER FETUS: CPT | Mod: PBBFAC,PN | Performed by: OBSTETRICS & GYNECOLOGY

## 2023-07-14 PROCEDURE — 76819 US OB/GYN PROCEDURE (VIEWPOINT): ICD-10-PCS | Mod: 26,S$PBB,, | Performed by: OBSTETRICS & GYNECOLOGY

## 2023-07-14 PROCEDURE — 99213 OFFICE O/P EST LOW 20 MIN: CPT | Mod: TH,S$PBB,, | Performed by: MIDWIFE

## 2023-07-14 PROCEDURE — 99999 PR PBB SHADOW E&M-EST. PATIENT-LVL III: ICD-10-PCS | Mod: PBBFAC,,, | Performed by: MIDWIFE

## 2023-07-14 PROCEDURE — 99213 PR OFFICE/OUTPT VISIT, EST, LEVL III, 20-29 MIN: ICD-10-PCS | Mod: TH,S$PBB,, | Performed by: MIDWIFE

## 2023-07-14 PROCEDURE — 99213 OFFICE O/P EST LOW 20 MIN: CPT | Mod: PBBFAC,TH,PN,25 | Performed by: MIDWIFE

## 2023-07-14 PROCEDURE — 99999 PR PBB SHADOW E&M-EST. PATIENT-LVL III: CPT | Mod: PBBFAC,,, | Performed by: MIDWIFE

## 2023-07-14 PROCEDURE — 76819 FETAL BIOPHYS PROFIL W/O NST: CPT | Mod: 26,S$PBB,, | Performed by: OBSTETRICS & GYNECOLOGY

## 2023-07-14 NOTE — PROGRESS NOTES
38 y.o. female  at 32w4d   Reports + FM, denies VB, LOF or CTX  Doing well without concerns.  TW.5 lbs   Reviewed 28wk lab results (AB POS)    Diet controlled gestational diabetes mellitus (GDM) in third trimester  -     Growth scan today, cephalic presentation, ROBE wnl, EFW 12%ile, 3lb 12oz, MVP 4.8cm. repeat growth scan at 36w  - glucose logs reviewed:   Fastings: , 2 outliers  2 hour pp: , 5 outliers- admits that when glucose is elevated it was because she ate something she was not supposed to. Reiterated importance of diet compliance.      Cervical cerclage suture present in third trimester  - no complications  - plan to remove 36-37w    Multigravida of advanced maternal age in third trimester  -baby ASA  -mat21 neg  Reviewed warning signs, normal FKCs,  labor precautions and how/when to call.  RTC x 2 wks, call or present sooner prn.

## 2023-07-24 ENCOUNTER — PATIENT MESSAGE (OUTPATIENT)
Dept: OBSTETRICS AND GYNECOLOGY | Facility: CLINIC | Age: 38
End: 2023-07-24
Payer: MEDICAID

## 2023-07-26 ENCOUNTER — HOSPITAL ENCOUNTER (OUTPATIENT)
Facility: HOSPITAL | Age: 38
Discharge: HOME | End: 2023-07-26
Attending: OBSTETRICS & GYNECOLOGY | Admitting: OBSTETRICS & GYNECOLOGY
Payer: MEDICAID

## 2023-07-26 ENCOUNTER — NURSE TRIAGE (OUTPATIENT)
Dept: ADMINISTRATIVE | Facility: CLINIC | Age: 38
End: 2023-07-26
Payer: MEDICAID

## 2023-07-26 VITALS
HEART RATE: 84 BPM | WEIGHT: 141 LBS | SYSTOLIC BLOOD PRESSURE: 119 MMHG | OXYGEN SATURATION: 87 % | HEIGHT: 60 IN | DIASTOLIC BLOOD PRESSURE: 68 MMHG | TEMPERATURE: 98 F | BODY MASS INDEX: 27.68 KG/M2

## 2023-07-26 DIAGNOSIS — N76.0 BV (BACTERIAL VAGINOSIS): Primary | ICD-10-CM

## 2023-07-26 DIAGNOSIS — R10.9 ABDOMINAL PAIN IN PREGNANCY, THIRD TRIMESTER: ICD-10-CM

## 2023-07-26 DIAGNOSIS — O26.893 ABDOMINAL PAIN IN PREGNANCY, THIRD TRIMESTER: ICD-10-CM

## 2023-07-26 DIAGNOSIS — B96.89 BV (BACTERIAL VAGINOSIS): Primary | ICD-10-CM

## 2023-07-26 PROBLEM — B37.31 YEAST INFECTION OF THE VAGINA: Status: ACTIVE | Noted: 2023-07-26

## 2023-07-26 LAB
BACTERIA #/AREA URNS HPF: ABNORMAL /HPF
BILIRUB UR QL STRIP: NEGATIVE
CLARITY UR: ABNORMAL
COLOR UR: YELLOW
GLUCOSE UR QL STRIP: NEGATIVE
HGB UR QL STRIP: NEGATIVE
KETONES UR QL STRIP: NEGATIVE
LEUKOCYTE ESTERASE UR QL STRIP: ABNORMAL
MICROSCOPIC COMMENT: ABNORMAL
NITRITE UR QL STRIP: NEGATIVE
PH UR STRIP: 7 [PH] (ref 5–8)
PROT UR QL STRIP: NEGATIVE
RBC #/AREA URNS HPF: 1 /HPF (ref 0–4)
SP GR UR STRIP: 1.01 (ref 1–1.03)
SQUAMOUS #/AREA URNS HPF: 3 /HPF
URN SPEC COLLECT METH UR: ABNORMAL
UROBILINOGEN UR STRIP-ACNC: ABNORMAL EU/DL
WBC #/AREA URNS HPF: 5 /HPF (ref 0–5)

## 2023-07-26 PROCEDURE — 87077 CULTURE AEROBIC IDENTIFY: CPT | Performed by: ADVANCED PRACTICE MIDWIFE

## 2023-07-26 PROCEDURE — 81000 URINALYSIS NONAUTO W/SCOPE: CPT | Performed by: ADVANCED PRACTICE MIDWIFE

## 2023-07-26 PROCEDURE — 99211 OFF/OP EST MAY X REQ PHY/QHP: CPT | Mod: 25

## 2023-07-26 PROCEDURE — 99214 PR OFFICE/OUTPT VISIT, EST, LEVL IV, 30-39 MIN: ICD-10-PCS | Mod: 25,TH,, | Performed by: ADVANCED PRACTICE MIDWIFE

## 2023-07-26 PROCEDURE — 87210 PR  SMEAR,STAIN,WET MNT,INTERP: ICD-10-PCS | Mod: QW,,, | Performed by: ADVANCED PRACTICE MIDWIFE

## 2023-07-26 PROCEDURE — 87210 SMEAR WET MOUNT SALINE/INK: CPT | Mod: QW,,, | Performed by: ADVANCED PRACTICE MIDWIFE

## 2023-07-26 PROCEDURE — 87086 URINE CULTURE/COLONY COUNT: CPT | Performed by: ADVANCED PRACTICE MIDWIFE

## 2023-07-26 PROCEDURE — 59025 FETAL NON-STRESS TEST: CPT | Mod: 26,,, | Performed by: ADVANCED PRACTICE MIDWIFE

## 2023-07-26 PROCEDURE — 59025 FETAL NON-STRESS TEST: CPT

## 2023-07-26 PROCEDURE — 99214 OFFICE O/P EST MOD 30 MIN: CPT | Mod: 25,TH,, | Performed by: ADVANCED PRACTICE MIDWIFE

## 2023-07-26 PROCEDURE — 87088 URINE BACTERIA CULTURE: CPT | Performed by: ADVANCED PRACTICE MIDWIFE

## 2023-07-26 PROCEDURE — 59025 OBTAIN FETAL NONSTRESS TEST (NST): ICD-10-PCS | Mod: 26,,, | Performed by: ADVANCED PRACTICE MIDWIFE

## 2023-07-26 PROCEDURE — 87186 SC STD MICRODIL/AGAR DIL: CPT | Performed by: ADVANCED PRACTICE MIDWIFE

## 2023-07-26 RX ORDER — PROCHLORPERAZINE EDISYLATE 5 MG/ML
5 INJECTION INTRAMUSCULAR; INTRAVENOUS EVERY 6 HOURS PRN
Status: DISCONTINUED | OUTPATIENT
Start: 2023-07-26 | End: 2023-07-27 | Stop reason: HOSPADM

## 2023-07-26 RX ORDER — ONDANSETRON 8 MG/1
8 TABLET, ORALLY DISINTEGRATING ORAL EVERY 8 HOURS PRN
Status: DISCONTINUED | OUTPATIENT
Start: 2023-07-26 | End: 2023-07-27 | Stop reason: HOSPADM

## 2023-07-26 RX ORDER — ACETAMINOPHEN 500 MG
500 TABLET ORAL EVERY 6 HOURS PRN
Status: DISCONTINUED | OUTPATIENT
Start: 2023-07-26 | End: 2023-07-27 | Stop reason: HOSPADM

## 2023-07-26 RX ORDER — FLUCONAZOLE 150 MG/1
150 TABLET ORAL DAILY
Qty: 1 TABLET | Refills: 0 | Status: SHIPPED | OUTPATIENT
Start: 2023-07-26 | End: 2023-07-27

## 2023-07-26 RX ORDER — METRONIDAZOLE 500 MG/1
500 TABLET ORAL 2 TIMES DAILY
Qty: 14 TABLET | Refills: 0 | Status: SHIPPED | OUTPATIENT
Start: 2023-07-26 | End: 2023-08-03

## 2023-07-27 ENCOUNTER — ROUTINE PRENATAL (OUTPATIENT)
Dept: OBSTETRICS AND GYNECOLOGY | Facility: CLINIC | Age: 38
End: 2023-07-27
Payer: MEDICAID

## 2023-07-27 VITALS
BODY MASS INDEX: 27.38 KG/M2 | DIASTOLIC BLOOD PRESSURE: 72 MMHG | WEIGHT: 140.19 LBS | SYSTOLIC BLOOD PRESSURE: 104 MMHG

## 2023-07-27 DIAGNOSIS — R30.0 DYSURIA DURING PREGNANCY, THIRD TRIMESTER: ICD-10-CM

## 2023-07-27 DIAGNOSIS — O26.893 DYSURIA DURING PREGNANCY, THIRD TRIMESTER: ICD-10-CM

## 2023-07-27 DIAGNOSIS — O09.523 MULTIGRAVIDA OF ADVANCED MATERNAL AGE IN THIRD TRIMESTER: Primary | ICD-10-CM

## 2023-07-27 DIAGNOSIS — O34.33 CERVICAL CERCLAGE SUTURE PRESENT IN THIRD TRIMESTER: ICD-10-CM

## 2023-07-27 PROCEDURE — 99999 PR PBB SHADOW E&M-EST. PATIENT-LVL III: ICD-10-PCS | Mod: PBBFAC,,, | Performed by: OBSTETRICS & GYNECOLOGY

## 2023-07-27 PROCEDURE — 99213 OFFICE O/P EST LOW 20 MIN: CPT | Mod: PBBFAC,TH,PN | Performed by: OBSTETRICS & GYNECOLOGY

## 2023-07-27 PROCEDURE — 99214 PR OFFICE/OUTPT VISIT, EST, LEVL IV, 30-39 MIN: ICD-10-PCS | Mod: TH,S$PBB,, | Performed by: OBSTETRICS & GYNECOLOGY

## 2023-07-27 PROCEDURE — 99214 OFFICE O/P EST MOD 30 MIN: CPT | Mod: TH,S$PBB,, | Performed by: OBSTETRICS & GYNECOLOGY

## 2023-07-27 PROCEDURE — 99999 PR PBB SHADOW E&M-EST. PATIENT-LVL III: CPT | Mod: PBBFAC,,, | Performed by: OBSTETRICS & GYNECOLOGY

## 2023-07-27 RX ORDER — AMOXICILLIN AND CLAVULANATE POTASSIUM 875; 125 MG/1; MG/1
1 TABLET, FILM COATED ORAL EVERY 12 HOURS
Qty: 14 TABLET | Refills: 0 | Status: SHIPPED | OUTPATIENT
Start: 2023-07-27 | End: 2023-08-03

## 2023-07-27 NOTE — H&P
[facility] - Labor & Delivery  Obstetrics  History & Physical    Patient Name: Ebony Rodriguez  MRN: [id number]  Admission Date: 2023  Primary Care Provider: Primary Doctor No    Subjective:     Principal Problem:Abdominal pain in pregnancy, third trimester    History of Present Illness:  37yo   EGA 34w2d presents to  with c/o abdominal pain associated with baby's movement, increase in vaginal discharge, urinary and anal leaking. Has cerclage present. Denies VB or contractions. Reports good fetal movements.       Obstetric HPI:  This pregnancy has been complicated by   Cervical cerclage present  History of incompetent cervix  Hx e coli UTI  AMA  GDM diet controlled    OB History    Para Term  AB Living   9 6 4 2 2 6   SAB IAB Ectopic Multiple Live Births   2 0 0 0 6      # Outcome Date GA Lbr Andres/2nd Weight Sex Delivery Anes PTL Lv   9 Current            8 Term 19 39w0d / 00:19 2.693 kg (5 lb 15 oz) M Vag-Spont None N KOFI      Name: JAZLYN RODRIGUEZ      Apgar1: 9  Apgar5: 9   7 Term 17 39w5d  3.035 kg (6 lb 11.1 oz) M Vag-Spont None N KOFI      Name: JAZLYN RODRIGUEZ      Apgar1: 9  Apgar5: 9   6 Term 14 38w0d   M Vag-Spont   KOFI      Complications: Cervical cerclage suture present   5 Term 13 38w0d   M Vag-Spont   KOFI      Complications: Cervical cerclage suture present   4 SAB 12 20w0d   F SAB   ND   3  10/31/09 36w0d   F Vag-Spont   KOFI      Complications: Cervical cerclage suture present   2  05 36w0d   F Vag-Spont   KOFI      Complications: Cervical cerclage suture present   1 SAB 00 20w0d   M    ND     Past Medical History:   Diagnosis Date    Anemia     H/O cervical cerclage, currently pregnant     Incompetence of cervix 2016    cerlaged to be placed 2018--josh ordered    Incompetent cervix during second trimester, antepartum 2018    Miscarriage     PONV (postoperative nausea and vomiting)       Past Surgical History:   Procedure Laterality Date    CERVICAL CERCLAGE      CERVICAL CERCLAGE N/A 2018    Procedure: CERCLAGE, CERVIX;  Surgeon: Keila Lema MD;  Location: [facility] OR;  Service: OB/GYN;  Laterality: N/A;    CERVICAL CERCLAGE N/A 3/1/2023    Procedure: CERCLAGE, CERVIX;  Surgeon: Keila Lema MD;  Location: [facility] OR;  Service: OB/GYN;  Laterality: N/A;       PTA Medications   Medication Sig    aspirin (ECOTRIN) 81 MG EC tablet Take 1 tablet (81 mg total) by mouth once daily.    blood sugar diagnostic Strp To check BG 4 times daily, to use with insurance preferred meter    blood-glucose meter kit To check BG 4 times daily, to use with insurance preferred meter    [] docusate sodium (COLACE) 100 MG capsule Take 1 capsule (100 mg total) by mouth 2 (two) times daily.    lancets Misc To check BG 4 times daily, to use with insurance preferred meter    multivitamin capsule Take 1 capsule by mouth once daily.    ondansetron (ZOFRAN-ODT) 8 MG TbDL Take 1 tablet (8 mg total) by mouth every 12 (twelve) hours as needed (nausea).    polyethylene glycol (GLYCOLAX) 17 gram PwPk Take 17 g by mouth once daily.    progesterone (PROMETRIUM) 200 MG capsule Take 1 capsule (200 mg total) by mouth every evening.    promethazine (PHENERGAN) 25 MG tablet Take 1 tablet (25 mg total) by mouth every 6 (six) hours as needed for Nausea.       Review of patient's allergies indicates:  No Known Allergies     Family History       Problem Relation (Age of Onset)    Diabetes Mother    Heart failure Father          Tobacco Use    Smoking status: Never    Smokeless tobacco: Never   Substance and Sexual Activity    Alcohol use: Not Currently    Drug use: No    Sexual activity: Yes     Partners: Male     Review of Systems   Gastrointestinal:  Positive for abdominal pain (associated with baby's movement) and fecal incontinence.   Genitourinary:  Positive for bladder incontinence and vaginal discharge.  Negative for vaginal bleeding.    Objective:     Vital Signs (Most Recent):  Temp: 97.9 °F (36.6 °C) (23)  Pulse: 84 (23)  BP: 119/68 (23)  SpO2: (!) 87 % (23) Vital Signs (24h Range):  Temp:  [97.9 °F (36.6 °C)] 97.9 °F (36.6 °C)  Pulse:  [84] 84  SpO2:  [87 %] 87 %  BP: (119)/(68) 119/68     Weight: 64 kg (141 lb)  Body mass index is 27.54 kg/m².    FHT: 130 Cat 1 (reassuring)  TOCO:  Irregular with irritability     Physical Exam:   Constitutional: She is oriented to person, place, and time. Vital signs are normal. She appears well-developed and well-nourished. She is cooperative.    HENT:   Head: Normocephalic.      Cardiovascular:  Normal rate, regular rhythm and normal heart sounds.             Pulmonary/Chest: Effort normal.        Abdominal: Soft.   Gravid, non-tender     Genitourinary:    Uterus normal.   There is vaginal discharge (wet prep + clue cells and yeast) in the vagina.           Musculoskeletal: Normal range of motion and moves all extremeties.       Neurological: She is alert and oriented to person, place, and time. She has normal strength.    Skin: Skin is warm and dry. Capillary refill takes less than 2 seconds.    Psychiatric: She has a normal mood and affect. Her speech is normal and behavior is normal. Judgment and thought content normal.      Cervix:  Cervix C/T/H cerclage visualized intact     Significant Labs:  Lab Results   Component Value Date    GROUPTRH AB POS 2023    HEPBSAG Non-reactive 2023    STREPBCULT No Group B Streptococcus isolated 2018       No results for input(s): COLORU, CLARITYU, SPECGRAV, PHUR, PROTEINUA, GLUCOSEU, BILIRUBINCON, BLOODU, WBCU, RBCU, BACTERIA, MUCUS, NITRITE, LEUKOCYTESUR, UROBILINOGEN, HYALINECASTS in the last 48 hours.  I have personallly reviewed all pertinent lab results from the last 24 hours.    Assessment/Plan:     38 y.o. female  at 34w2d for:    * Abdominal pain in pregnancy,  third trimester  NST reactive  Occasional contractions, pain not associated with contractions, associated with fetal movement  Cerclage intact, cervix visualized closed/thick  Wet prep +clue cells and yeast  UA pending  Will send RX flagyl and diflucan  Keep appt for tomorrow    Cervical cerclage suture present in third trimester  Cerclage intact and cervix visualized C/T/H        Maryana Madsen CNM  Obstetrics  [facility] - [facility]

## 2023-07-27 NOTE — DISCHARGE SUMMARY
[facility] - [facility] Delivery  Obstetrics  Discharge Summary      Patient Name: Ebony Rodriguez  MRN: [id number]  Admission Date: 2023  Hospital Length of Stay: 0 days  Discharge Date and Time:  2023 10:44 PM  Attending Physician: Clayton Dai MD   Discharging Provider: Maryana Madsen CNM   Primary Care Provider: Primary Doctor No    HPI: 37yo   EGA 34w2d presents to  with c/o abdominal pain associated with baby's movement, increase in vaginal discharge, urinary and anal leaking. Has cerclage present. Denies VB or contractions. Reports good fetal movements.       FHT: 130 Cat 1 (reassuring)  TOCO:  occasional with irritability    * No surgery found *     Hospital Course:   NST reactive  Occasional contractions, pain not associated with contractions, associated with fetal movement  Cerclage intact, cervix visualized closed/thick  Wet prep +clue cells and yeast  UA sent for culture-will wait to treat after culture comes back  Will send RX flagyl and diflucan  Keep appt for tomorrow           Final Active Diagnoses:    Diagnosis Date Noted POA    PRINCIPAL PROBLEM:  BV (bacterial vaginosis) [N76.0, B96.89] 2023 Yes    Yeast infection of the vagina [B37.31] 2023 Yes    Cervical cerclage suture present in third trimester [O34.33] 2018 Yes      Problems Resolved During this Admission:        Significant Diagnostic Studies: Labs: All labs within the past 24 hours have been reviewed        Immunizations     None          This patient has no babies on file.  Pending Diagnostic Studies:     None          Discharged Condition: good    Disposition: Home or Self Care    Follow Up:   Follow-up Information     Maryana Madsen CNM Follow up in 2 day(s).    Specialty: Obstetrics and Gynecology  Why: routine prenatal visit  Contact information:  [address] [place] [address] [address]ia [address] [id number] [phone]                       Patient Instructions:      Diet Adult Regular     Notify  your health care provider if you experience any of the following:  temperature >100.4     Notify your health care provider if you experience any of the following:  persistent nausea and vomiting or diarrhea     Notify your health care provider if you experience any of the following:  severe uncontrolled pain     Notify your health care provider if you experience any of the following:  difficulty breathing or increased cough     Notify your health care provider if you experience any of the following:  severe persistent headache     Notify your health care provider if you experience any of the following:  worsening rash     Notify your health care provider if you experience any of the following:  persistent dizziness, light-headedness, or visual disturbances     Notify your health care provider if you experience any of the following:  increased confusion or weakness     Activity as tolerated     Medications:  Current Discharge Medication List      START taking these medications    Details   fluconazole (DIFLUCAN) 150 MG Tab Take 1 tablet (150 mg total) by mouth once daily. for 1 day  Qty: 1 tablet, Refills: 0      metroNIDAZOLE (FLAGYL) 500 MG tablet Take 1 tablet (500 mg total) by mouth 2 (two) times a day. for 7 days  Qty: 14 tablet, Refills: 0         CONTINUE these medications which have NOT CHANGED    Details   aspirin (ECOTRIN) 81 MG EC tablet Take 1 tablet (81 mg total) by mouth once daily.  Qty: 60 tablet, Refills: 2      blood sugar diagnostic Strp To check BG 4 times daily, to use with insurance preferred meter  Qty: 200 strip, Refills: 1    Associated Diagnoses: Diet controlled gestational diabetes mellitus (GDM) in third trimester      blood-glucose meter kit To check BG 4 times daily, to use with insurance preferred meter  Qty: 1 each, Refills: 0    Associated Diagnoses: Diet controlled gestational diabetes mellitus (GDM) in third trimester      lancets Misc To check BG 4 times daily, to use with  insurance preferred meter  Qty: 200 each, Refills: 1    Associated Diagnoses: Diet controlled gestational diabetes mellitus (GDM) in third trimester      multivitamin capsule Take 1 capsule by mouth once daily.      ondansetron (ZOFRAN-ODT) 8 MG TbDL Take 1 tablet (8 mg total) by mouth every 12 (twelve) hours as needed (nausea).  Qty: 30 tablet, Refills: 0    Associated Diagnoses: Nausea and vomiting in pregnancy      polyethylene glycol (GLYCOLAX) 17 gram PwPk Take 17 g by mouth once daily.  Qty: 20 packet, Refills: 1      progesterone (PROMETRIUM) 200 MG capsule Take 1 capsule (200 mg total) by mouth every evening.  Qty: 30 capsule, Refills: 11    Associated Diagnoses: History of cervical incompetence; Multigravida of advanced maternal age in second trimester      promethazine (PHENERGAN) 25 MG tablet Take 1 tablet (25 mg total) by mouth every 6 (six) hours as needed for Nausea.  Qty: 30 tablet, Refills: 1         STOP taking these medications       docusate sodium (COLACE) 100 MG capsule Comments:   Reason for Stopping:               Maryana Madsen CNM  Obstetrics  [facility]

## 2023-07-27 NOTE — TELEPHONE ENCOUNTER
Pt 34 weeks pregnant. Pt c/o vaginal swelling, ankle swelling, heartburn and clear vaginal discharge with odor that comes and goes. States that right leg is more swollen than left. States that last BS was 114. Pt states that she has a cerclage in place. States that earlier today she had blurred vision that is improving. Pt unable to check BP at this time. Advised to go to LD per protocol. Verbalized understanding. Encounter routed to provider.   Reason for Disposition   [1] Pregnant 20 or more weeks AND [2] new blurred vision or vision changes    Additional Information   Negative: SEVERE difficulty breathing (e.g., struggling for each breath, speaks in single words)   Negative: Sounds like a life-threatening emergency to the triager   Negative: SEVERE leg swelling (e.g., swelling extends above knee, entire leg is swollen)   Negative: Chest pain   Negative: [1] Difficulty breathing AND [2] new-onset or worsening    Protocols used: Pregnancy - Leg Swelling and Edema-A-AH

## 2023-07-27 NOTE — PROGRESS NOTES
Complaints today:  Feels has uti; ucx pending  Seen on l&d and treated for bv/yeast with flagyl and diflucan  +fetal movement, no srom no vag bleeding    /72   Wt 63.6 kg (140 lb 3.4 oz)   LMP 2022 (Exact Date)   BMI 27.38 kg/m²     38 y.o., at 34w3d by Estimated Date of Delivery: 23  Patient Active Problem List   Diagnosis    History of cervical incompetence    E-coli UTI    History of cerclage, currently pregnant    Cervical cerclage suture present in third trimester    Nausea and vomiting in pregnancy    Multigravida of advanced maternal age in third trimester    Abnormal glucose tolerance in pregnancy    Diet controlled gestational diabetes mellitus (GDM) in third trimester    BV (bacterial vaginosis)    Yeast infection of the vagina     OB History    Para Term  AB Living   9 6 4 2 2 6   SAB IAB Ectopic Multiple Live Births   2 0 0 0 6      # Outcome Date GA Lbr Andres/2nd Weight Sex Delivery Anes PTL Lv   9 Current            8 Term 19 39w0d / 00:19 2.693 kg (5 lb 15 oz) M Vag-Spont None N KOFI   7 Term 17 39w5d  3.035 kg (6 lb 11.1 oz) M Vag-Spont None N KOFI   6 Term 14 38w0d   M Vag-Spont   KOFI      Complications: Cervical cerclage suture present   5 Term 13 38w0d   M Vag-Spont   KOFI      Complications: Cervical cerclage suture present   4 SAB 12 20w0d   F SAB   ND   3  10/31/09 36w0d   F Vag-Spont   KOFI      Complications: Cervical cerclage suture present   2  05 36w0d   F Vag-Spont   KOFI      Complications: Cervical cerclage suture present   1 SAB 00 20w0d   M    ND       Dating reviewed    Allergies and problem list reviewed and updated    Medical and surgical history reviewed    Prenatal labs reviewed and updated    Physical Exam:  ABD: soft, gravid, nontender,     Assessment:  Encounter Diagnoses   Name Primary?    Dysuria during pregnancy, third trimester Yes    Breast feeding status of mother        Plan:    rx sent for macrobid  Breast pump rx given  Tl consents signed  Remove cerclage at 36w     follow up 1 Weeks, kick counts, labor precautions

## 2023-07-27 NOTE — DISCHARGE INSTRUCTIONS

## 2023-07-27 NOTE — HOSPITAL COURSE
NST reactive  Occasional contractions, pain not associated with contractions, associated with fetal movement  Cerclage intact, cervix visualized closed/thick  Wet prep +clue cells and yeast  UA sent for culture-will wait to treat after culture comes back  Will send RX flagyl and diflucan  Keep appt for tomorrow

## 2023-07-27 NOTE — PROCEDURES
Ebony Rodriguez is a 38 y.o. female patient.    Temp: 97.9 °F (36.6 °C) (07/26/23 2038)  Pulse: 84 (07/26/23 2038)  BP: 119/68 (07/26/23 2038)  SpO2: (!) 87 % (07/26/23 2038)  Weight: 64 kg (141 lb) (07/26/23 2053)  Height: 5' (152.4 cm) (07/26/23 2053)       Obtain Fetal nonstress test (NST)    Date/Time: 7/26/2023 10:25 PM  Performed by: Maryana Madsen CNM  Authorized by: Maryana Madsen CNM     Nonstress Test:     Variability:  6-25 BPM    Decelerations:  None    Accelerations:  15 bpm    Baseline:  130    Uterine Irritability: Yes      Contractions:  Irregular  Biophysical Profile:     Nonstress Test Interpretation: reactive      Overall Impression:  Reassuring  Post-procedure:     Patient tolerance:  Patient tolerated the procedure well with no immediate complications    7/26/2023

## 2023-07-27 NOTE — HPI
39yo   EGA 34w2d presents to LD with c/o abdominal pain associated with baby's movement, increase in vaginal discharge, urinary and anal leaking. Has cerclage present. Denies VB or contractions. Reports good fetal movements.

## 2023-07-27 NOTE — SUBJECTIVE & OBJECTIVE
Obstetric HPI:  This pregnancy has been complicated by   Cervical cerclage present  History of incompetent cervix  Hx e coli UTI  AMA  GDM diet controlled    OB History    Para Term  AB Living   9 6 4 2 2 6   SAB IAB Ectopic Multiple Live Births   2 0 0 0 6      # Outcome Date GA Lbr Andres/2nd Weight Sex Delivery Anes PTL Lv   9 Current            8 Term 19 39w0d / 00:19 2.693 kg (5 lb 15 oz) M Vag-Spont None N KOFI      Name: JAZLYN NICOLE      Apgar1: 9  Apgar5: 9   7 Term 17 39w5d  3.035 kg (6 lb 11.1 oz) M Vag-Spont None N KOFI      Name: JAZLYN NICOLE      Apgar1: 9  Apgar5: 9   6 Term 14 38w0d   M Vag-Spont   KOFI      Complications: Cervical cerclage suture present   5 Term 13 38w0d   M Vag-Spont   KOFI      Complications: Cervical cerclage suture present   4 SAB 12 20w0d   F SAB   ND   3  10/31/09 36w0d   F Vag-Spont   KOFI      Complications: Cervical cerclage suture present   2  05 36w0d   F Vag-Spont   KOFI      Complications: Cervical cerclage suture present   1 SAB 00 20w0d   M    ND     Past Medical History:   Diagnosis Date    Anemia     H/O cervical cerclage, currently pregnant     Incompetence of cervix 2016    cerlaged to be placed 2018--josh ordered    Incompetent cervix during second trimester, antepartum 2018    Miscarriage     PONV (postoperative nausea and vomiting)      Past Surgical History:   Procedure Laterality Date    CERVICAL CERCLAGE      CERVICAL CERCLAGE N/A 2018    Procedure: CERCLAGE, CERVIX;  Surgeon: Keila Lema MD;  Location: [facility] OR;  Service: OB/GYN;  Laterality: N/A;    CERVICAL CERCLAGE N/A 3/1/2023    Procedure: CERCLAGE, CERVIX;  Surgeon: Keila Lema MD;  Location: [facility] OR;  Service: OB/GYN;  Laterality: N/A;       PTA Medications   Medication Sig    aspirin (ECOTRIN) 81 MG EC tablet Take 1 tablet (81 mg total) by mouth once daily.    blood sugar diagnostic  Strp To check BG 4 times daily, to use with insurance preferred meter    blood-glucose meter kit To check BG 4 times daily, to use with insurance preferred meter    [] docusate sodium (COLACE) 100 MG capsule Take 1 capsule (100 mg total) by mouth 2 (two) times daily.    lancets Misc To check BG 4 times daily, to use with insurance preferred meter    multivitamin capsule Take 1 capsule by mouth once daily.    ondansetron (ZOFRAN-ODT) 8 MG TbDL Take 1 tablet (8 mg total) by mouth every 12 (twelve) hours as needed (nausea).    polyethylene glycol (GLYCOLAX) 17 gram PwPk Take 17 g by mouth once daily.    progesterone (PROMETRIUM) 200 MG capsule Take 1 capsule (200 mg total) by mouth every evening.    promethazine (PHENERGAN) 25 MG tablet Take 1 tablet (25 mg total) by mouth every 6 (six) hours as needed for Nausea.       Review of patient's allergies indicates:  No Known Allergies     Family History       Problem Relation (Age of Onset)    Diabetes Mother    Heart failure Father          Tobacco Use    Smoking status: Never    Smokeless tobacco: Never   Substance and Sexual Activity    Alcohol use: Not Currently    Drug use: No    Sexual activity: Yes     Partners: Male     Review of Systems   Gastrointestinal:  Positive for abdominal pain (associated with baby's movement) and fecal incontinence.   Genitourinary:  Positive for bladder incontinence and vaginal discharge. Negative for vaginal bleeding.    Objective:     Vital Signs (Most Recent):  Temp: 97.9 °F (36.6 °C) (23)  Pulse: 84 (23)  BP: 119/68 (23)  SpO2: (!) 87 % (23) Vital Signs (24h Range):  Temp:  [97.9 °F (36.6 °C)] 97.9 °F (36.6 °C)  Pulse:  [84] 84  SpO2:  [87 %] 87 %  BP: (119)/(68) 119/68     Weight: 64 kg (141 lb)  Body mass index is 27.54 kg/m².    FHT: 130 Cat 1 (reassuring)  TOCO:  Irregular with irritability     Physical Exam:   Constitutional: She is oriented to person, place, and time. Vital  signs are normal. She appears well-developed and well-nourished. She is cooperative.    HENT:   Head: Normocephalic.      Cardiovascular:  Normal rate, regular rhythm and normal heart sounds.             Pulmonary/Chest: Effort normal.        Abdominal: Soft.   Gravid, non-tender     Genitourinary:    Uterus normal.   There is vaginal discharge (wet prep + clue cells and yeast) in the vagina.           Musculoskeletal: Normal range of motion and moves all extremeties.       Neurological: She is alert and oriented to person, place, and time. She has normal strength.    Skin: Skin is warm and dry. Capillary refill takes less than 2 seconds.    Psychiatric: She has a normal mood and affect. Her speech is normal and behavior is normal. Judgment and thought content normal.      Cervix:  Cervix C/T/H cerclage visualized intact     Significant Labs:  Lab Results   Component Value Date    GROUPTRH AB POS 01/06/2023    HEPBSAG Non-reactive 01/05/2023    STREPBCULT No Group B Streptococcus isolated 12/27/2018       No results for input(s): COLORU, CLARITYU, SPECGRAV, PHUR, PROTEINUA, GLUCOSEU, BILIRUBINCON, BLOODU, WBCU, RBCU, BACTERIA, MUCUS, NITRITE, LEUKOCYTESUR, UROBILINOGEN, HYALINECASTS in the last 48 hours.  I have personallly reviewed all pertinent lab results from the last 24 hours.

## 2023-07-27 NOTE — ASSESSMENT & PLAN NOTE
NST reactive  Occasional contractions, pain not associated with contractions, associated with fetal movement  Cerclage intact, cervix visualized closed/thick  Wet prep +clue cells and yeast  UA pending  Will send RX flagyl and diflucan  Keep appt for tomorrow

## 2023-07-28 LAB — BACTERIA UR CULT: ABNORMAL

## 2023-07-31 ENCOUNTER — PATIENT MESSAGE (OUTPATIENT)
Dept: OTHER | Facility: OTHER | Age: 38
End: 2023-07-31
Payer: MEDICAID

## 2023-08-03 ENCOUNTER — ROUTINE PRENATAL (OUTPATIENT)
Dept: OBSTETRICS AND GYNECOLOGY | Facility: CLINIC | Age: 38
End: 2023-08-03
Payer: MEDICAID

## 2023-08-03 ENCOUNTER — PROCEDURE VISIT (OUTPATIENT)
Dept: OBSTETRICS AND GYNECOLOGY | Facility: CLINIC | Age: 38
End: 2023-08-03
Payer: MEDICAID

## 2023-08-03 VITALS
WEIGHT: 139.69 LBS | BODY MASS INDEX: 27.28 KG/M2 | SYSTOLIC BLOOD PRESSURE: 120 MMHG | DIASTOLIC BLOOD PRESSURE: 82 MMHG

## 2023-08-03 DIAGNOSIS — O24.410 DIET CONTROLLED GESTATIONAL DIABETES MELLITUS (GDM) IN THIRD TRIMESTER: Primary | ICD-10-CM

## 2023-08-03 DIAGNOSIS — O24.410 DIET CONTROLLED GESTATIONAL DIABETES MELLITUS (GDM) IN THIRD TRIMESTER: ICD-10-CM

## 2023-08-03 PROCEDURE — 99999 PR PBB SHADOW E&M-EST. PATIENT-LVL II: ICD-10-PCS | Mod: PBBFAC,,, | Performed by: MIDWIFE

## 2023-08-03 PROCEDURE — 99213 PR OFFICE/OUTPT VISIT, EST, LEVL III, 20-29 MIN: ICD-10-PCS | Mod: TH,S$PBB,, | Performed by: MIDWIFE

## 2023-08-03 PROCEDURE — 99213 OFFICE O/P EST LOW 20 MIN: CPT | Mod: TH,S$PBB,, | Performed by: MIDWIFE

## 2023-08-03 PROCEDURE — 99999 PR PBB SHADOW E&M-EST. PATIENT-LVL II: CPT | Mod: PBBFAC,,, | Performed by: MIDWIFE

## 2023-08-03 PROCEDURE — 76819 US OB/GYN PROCEDURE (VIEWPOINT): ICD-10-PCS | Mod: 26,S$PBB,, | Performed by: OBSTETRICS & GYNECOLOGY

## 2023-08-03 PROCEDURE — 99212 OFFICE O/P EST SF 10 MIN: CPT | Mod: PBBFAC,TH,PN | Performed by: MIDWIFE

## 2023-08-03 PROCEDURE — 76819 FETAL BIOPHYS PROFIL W/O NST: CPT | Mod: PBBFAC,PN | Performed by: OBSTETRICS & GYNECOLOGY

## 2023-08-03 RX ORDER — FLUCONAZOLE 150 MG/1
150 TABLET ORAL ONCE
Status: ON HOLD | COMMUNITY
Start: 2023-07-27 | End: 2023-08-20 | Stop reason: HOSPADM

## 2023-08-03 NOTE — PROGRESS NOTES
38 y.o. female  at 32w4d   Reports + FM, denies VB, LOF or CTX  Doing well without concerns.  TW.5 lbs      Diet controlled gestational diabetes mellitus (GDM) in third trimester  -     BPP  today, cepahlic presentation, MVP 4.6cm, ROBE 15.5cm   - glucose logs reviewed: most within normal range, a few outliers for post-prandials. Has recently incorporated exercise.  - Growth scan nv      Cervical cerclage suture present in third trimester  - no complications  - Removal next week at 36w     Multigravida of advanced maternal age in third trimester  -baby ASA  -mat21 neg    Reviewed warning signs, normal FKCs,  labor precautions and how/when to call.  RTC x 1 wk, call or present sooner prn.

## 2023-08-09 DIAGNOSIS — O24.410 DIET CONTROLLED GESTATIONAL DIABETES MELLITUS (GDM) IN THIRD TRIMESTER: ICD-10-CM

## 2023-08-10 ENCOUNTER — PROCEDURE VISIT (OUTPATIENT)
Dept: OBSTETRICS AND GYNECOLOGY | Facility: CLINIC | Age: 38
End: 2023-08-10
Payer: MEDICAID

## 2023-08-10 ENCOUNTER — PATIENT MESSAGE (OUTPATIENT)
Dept: OBSTETRICS AND GYNECOLOGY | Facility: CLINIC | Age: 38
End: 2023-08-10

## 2023-08-10 VITALS
SYSTOLIC BLOOD PRESSURE: 114 MMHG | DIASTOLIC BLOOD PRESSURE: 74 MMHG | BODY MASS INDEX: 28.45 KG/M2 | HEIGHT: 60 IN | WEIGHT: 144.94 LBS

## 2023-08-10 DIAGNOSIS — O34.33 CERVICAL CERCLAGE SUTURE PRESENT IN THIRD TRIMESTER: ICD-10-CM

## 2023-08-10 DIAGNOSIS — O24.410 DIET CONTROLLED GESTATIONAL DIABETES MELLITUS (GDM) IN THIRD TRIMESTER: ICD-10-CM

## 2023-08-10 DIAGNOSIS — O09.523 MULTIGRAVIDA OF ADVANCED MATERNAL AGE IN THIRD TRIMESTER: Primary | ICD-10-CM

## 2023-08-10 DIAGNOSIS — Z36.85 ANTENATAL SCREENING FOR STREPTOCOCCUS B: ICD-10-CM

## 2023-08-10 PROCEDURE — 76819 FETAL BIOPHYS PROFIL W/O NST: CPT | Mod: PBBFAC,PN | Performed by: OBSTETRICS & GYNECOLOGY

## 2023-08-10 PROCEDURE — 76819 US OB/GYN PROCEDURE (VIEWPOINT): ICD-10-PCS | Mod: 26,S$PBB,, | Performed by: OBSTETRICS & GYNECOLOGY

## 2023-08-10 PROCEDURE — 99213 OFFICE O/P EST LOW 20 MIN: CPT | Mod: TH,S$PBB,, | Performed by: OBSTETRICS & GYNECOLOGY

## 2023-08-10 PROCEDURE — 87081 CULTURE SCREEN ONLY: CPT | Performed by: OBSTETRICS & GYNECOLOGY

## 2023-08-10 PROCEDURE — 99213 PR OFFICE/OUTPT VISIT, EST, LEVL III, 20-29 MIN: ICD-10-PCS | Mod: TH,S$PBB,, | Performed by: OBSTETRICS & GYNECOLOGY

## 2023-08-10 NOTE — PROCEDURES
Procedures    Speculum inserted  Cerclage suture grasped with ring forcep ; the left side of string cut  Suture removed intact  Silver nitrate applied   Pt tolerated procedure well

## 2023-08-10 NOTE — PROGRESS NOTES
"Complaints today:  C/o increased pelvic pressure  "everything hurts"      /74   Ht 5' (1.524 m)   Wt 65.8 kg (144 lb 15.2 oz)   LMP 2022 (Exact Date)   BMI 28.31 kg/m²     38 y.o., at 36w3d by Estimated Date of Delivery: 23  Patient Active Problem List   Diagnosis    History of cervical incompetence    History of cerclage, currently pregnant    Cervical cerclage suture present in third trimester    Nausea and vomiting in pregnancy    Multigravida of advanced maternal age in third trimester    Abnormal glucose tolerance in pregnancy    Diet controlled gestational diabetes mellitus (GDM) in third trimester    BV (bacterial vaginosis)    Yeast infection of the vagina     OB History    Para Term  AB Living   9 6 4 2 2 6   SAB IAB Ectopic Multiple Live Births   2 0 0 0 6      # Outcome Date GA Lbr Andres/2nd Weight Sex Delivery Anes PTL Lv   9 Current            8 Term 19 39w0d / 00:19 2.693 kg (5 lb 15 oz) M Vag-Spont None N KOFI   7 Term 17 39w5d  3.035 kg (6 lb 11.1 oz) M Vag-Spont None N KOFI   6 Term 14 38w0d   M Vag-Spont   KOFI      Complications: Cervical cerclage suture present   5 Term 13 38w0d   M Vag-Spont   KOFI      Complications: Cervical cerclage suture present   4 SAB 12 20w0d   F SAB   ND   3  10/31/09 36w0d   F Vag-Spont   KOFI      Complications: Cervical cerclage suture present   2  05 36w0d   F Vag-Spont   KOFI      Complications: Cervical cerclage suture present   1 SAB 00 20w0d   M    ND       Dating reviewed    Allergies and problem list reviewed and updated    Medical and surgical history reviewed    Prenatal labs reviewed and updated    Physical Exam:  ABD: soft, gravid, nontender,     Assessment:  Encounter Diagnoses   Name Primary?    Cervical cerclage suture present in third trimester     Multigravida of advanced maternal age in third trimester Yes    Diet controlled gestational diabetes mellitus " (GDM) in third trimester          Plan:   Cerclage removed  Did not bring accucheks  hgba1c next visit  Continue weekly bpp for ama, diabetes  gbs todtay  Advised contine daily aspirin   follow up 1 Weeks, kick counts, labor precautions

## 2023-08-11 ENCOUNTER — PATIENT MESSAGE (OUTPATIENT)
Dept: OBSTETRICS AND GYNECOLOGY | Facility: CLINIC | Age: 38
End: 2023-08-11
Payer: MEDICAID

## 2023-08-13 ENCOUNTER — HOSPITAL ENCOUNTER (OUTPATIENT)
Facility: HOSPITAL | Age: 38
Discharge: HOME | End: 2023-08-13
Attending: OBSTETRICS & GYNECOLOGY | Admitting: ADVANCED PRACTICE MIDWIFE
Payer: MEDICAID

## 2023-08-13 ENCOUNTER — NURSE TRIAGE (OUTPATIENT)
Dept: ADMINISTRATIVE | Facility: CLINIC | Age: 38
End: 2023-08-13
Payer: MEDICAID

## 2023-08-13 VITALS
HEART RATE: 67 BPM | OXYGEN SATURATION: 97 % | SYSTOLIC BLOOD PRESSURE: 136 MMHG | DIASTOLIC BLOOD PRESSURE: 80 MMHG | TEMPERATURE: 99 F | RESPIRATION RATE: 20 BRPM

## 2023-08-13 DIAGNOSIS — O36.8130 DECREASED FETAL MOVEMENTS IN THIRD TRIMESTER: ICD-10-CM

## 2023-08-13 PROCEDURE — 99211 OFF/OP EST MAY X REQ PHY/QHP: CPT

## 2023-08-13 PROCEDURE — 99214 OFFICE O/P EST MOD 30 MIN: CPT | Mod: 25,TH,, | Performed by: ADVANCED PRACTICE MIDWIFE

## 2023-08-13 PROCEDURE — 99214 PR OFFICE/OUTPT VISIT, EST, LEVL IV, 30-39 MIN: ICD-10-PCS | Mod: 25,TH,, | Performed by: ADVANCED PRACTICE MIDWIFE

## 2023-08-13 PROCEDURE — 59025 FETAL NON-STRESS TEST: CPT

## 2023-08-13 PROCEDURE — 59025 FETAL NON-STRESS TEST: CPT | Mod: 26,,, | Performed by: ADVANCED PRACTICE MIDWIFE

## 2023-08-13 PROCEDURE — 59025 OBTAIN FETAL NONSTRESS TEST (NST): ICD-10-PCS | Mod: 26,,, | Performed by: ADVANCED PRACTICE MIDWIFE

## 2023-08-13 RX ORDER — PROCHLORPERAZINE EDISYLATE 5 MG/ML
5 INJECTION INTRAMUSCULAR; INTRAVENOUS EVERY 6 HOURS PRN
Status: DISCONTINUED | OUTPATIENT
Start: 2023-08-13 | End: 2023-08-13 | Stop reason: HOSPADM

## 2023-08-13 RX ORDER — ACETAMINOPHEN 500 MG
500 TABLET ORAL EVERY 6 HOURS PRN
Status: DISCONTINUED | OUTPATIENT
Start: 2023-08-13 | End: 2023-08-13 | Stop reason: HOSPADM

## 2023-08-13 RX ORDER — ONDANSETRON 8 MG/1
8 TABLET, ORALLY DISINTEGRATING ORAL EVERY 8 HOURS PRN
Status: DISCONTINUED | OUTPATIENT
Start: 2023-08-13 | End: 2023-08-13 | Stop reason: HOSPADM

## 2023-08-13 NOTE — SUBJECTIVE & OBJECTIVE
Obstetric HPI:  Patient reports None contractions, decreased  fetal movement, No vaginal bleeding , No loss of fluid     This pregnancy has been complicated by   History of cervical incompetence with cervical cerclage-removed 8/10/23  AMA  Diet controlled GDM    OB History    Para Term  AB Living   9 6 4 2 2 6   SAB IAB Ectopic Multiple Live Births   2 0 0 0 6      # Outcome Date GA Lbr Andres/2nd Weight Sex Delivery Anes PTL Lv   9 Current            8 Term 19 39w0d / 00:19 2.693 kg (5 lb 15 oz) M Vag-Spont None N KOFI      Name: JAZLYN NICOLE SITA      Apgar1: 9  Apgar5: 9   7 Term 17 39w5d  3.035 kg (6 lb 11.1 oz) M Vag-Spont None N KOFI      Name: JAZLYN NICOLE SITA      Apgar1: 9  Apgar5: 9   6 Term 14 38w0d   M Vag-Spont   KOFI      Complications: Cervical cerclage suture present   5 Term 13 38w0d   M Vag-Spont   KOFI      Complications: Cervical cerclage suture present   4 SAB 12 20w0d   F SAB   ND   3  10/31/09 36w0d   F Vag-Spont   KOFI      Complications: Cervical cerclage suture present   2  05 36w0d   F Vag-Spont   KOFI      Complications: Cervical cerclage suture present   1 SAB 00 20w0d   M    ND     Past Medical History:   Diagnosis Date    Anemia     H/O cervical cerclage, currently pregnant     Incompetence of cervix 2016    cerlaged to be placed 2018--josh ordered    Incompetent cervix during second trimester, antepartum 2018    Miscarriage     PONV (postoperative nausea and vomiting)      Past Surgical History:   Procedure Laterality Date    CERVICAL CERCLAGE      CERVICAL CERCLAGE N/A 2018    Procedure: CERCLAGE, CERVIX;  Surgeon: Keila Lema MD;  Location: [facility] OR;  Service: OB/GYN;  Laterality: N/A;    CERVICAL CERCLAGE N/A 3/1/2023    Procedure: CERCLAGE, CERVIX;  Surgeon: Keila Lema MD;  Location: [facility] OR;  Service: OB/GYN;  Laterality: N/A;       PTA Medications   Medication Sig     aspirin (ECOTRIN) 81 MG EC tablet Take 1 tablet (81 mg total) by mouth once daily.    blood sugar diagnostic Strp To check BG 4 times daily, to use with insurance preferred meter    blood-glucose meter kit To check BG 4 times daily, to use with insurance preferred meter    fluconazole (DIFLUCAN) 150 MG Tab Take 150 mg by mouth once.    lancets Misc To check BG 4 times daily, to use with insurance preferred meter    multivitamin capsule Take 1 capsule by mouth once daily.    ondansetron (ZOFRAN-ODT) 8 MG TbDL Take 1 tablet (8 mg total) by mouth every 12 (twelve) hours as needed (nausea).    polyethylene glycol (GLYCOLAX) 17 gram PwPk Take 17 g by mouth once daily.    progesterone (PROMETRIUM) 200 MG capsule Take 1 capsule (200 mg total) by mouth every evening.    promethazine (PHENERGAN) 25 MG tablet Take 1 tablet (25 mg total) by mouth every 6 (six) hours as needed for Nausea.       Review of patient's allergies indicates:  No Known Allergies     Family History       Problem Relation (Age of Onset)    Diabetes Mother    Heart failure Father          Tobacco Use    Smoking status: Never    Smokeless tobacco: Never   Substance and Sexual Activity    Alcohol use: Not Currently    Drug use: No    Sexual activity: Yes     Partners: Male     Review of Systems   Cardiovascular:  Positive for leg swelling.   Gastrointestinal:  Negative for abdominal pain.   Genitourinary:  Negative for vaginal bleeding and vaginal discharge.   All other systems reviewed and are negative.     Objective:     Vital Signs (Most Recent):  Temp: 98.6 °F (37 °C) (08/13/23 1316)  Pulse: 67 (08/13/23 1316)  Resp: 20 (08/13/23 1316)  BP: 136/80 (08/13/23 1316)  SpO2: 97 % (08/13/23 1316) Vital Signs (24h Range):  Temp:  [98.6 °F (37 °C)] 98.6 °F (37 °C)  Pulse:  [67] 67  Resp:  [20] 20  SpO2:  [97 %] 97 %  BP: (136)/(80) 136/80        There is no height or weight on file to calculate BMI.    FHT: 130 Cat 1 (reassuring) reactive NST  TOCO:  none      Physical Exam:   Constitutional: She is oriented to person, place, and time. Vital signs are normal. She appears well-developed and well-nourished. She is cooperative.    HENT:   Head: Normocephalic.      Cardiovascular:  Normal rate, regular rhythm and normal heart sounds.      Exam reveals edema (1+ BLE).        Pulmonary/Chest: Effort normal.        Abdominal: Soft.   Gravid, non-tender             Musculoskeletal: Normal range of motion and moves all extremeties.       Neurological: She is alert and oriented to person, place, and time. She has normal strength.    Skin: Skin is warm and dry. Capillary refill takes less than 2 seconds.    Psychiatric: She has a normal mood and affect. Her speech is normal and behavior is normal. Judgment and thought content normal.        Cervix:  deferred     Significant Labs:  Lab Results   Component Value Date    GROUPTRH AB POS 01/06/2023    HEPBSAG Non-reactive 01/05/2023    STREPBCULT Normal cervicovaginal richmond present 08/10/2023       I have personallly reviewed all pertinent lab results from the last 24 hours.  Recent Lab Results       None

## 2023-08-13 NOTE — HOSPITAL COURSE
Reactive NST, patient now reports good fetal movement  Will  d/c home and instructed to f/u in clinic this week with US  Continue [facility]

## 2023-08-13 NOTE — H&P
[facility] - Labor & Delivery  Obstetrics  History & Physical    Patient Name: Ebony Rodriguez  MRN: [id number]  Admission Date: 2023  Primary Care Provider: Ruth, Primary Doctor    Subjective:     Principal Problem:Decreased fetal movements in third trimester    History of Present Illness:  39yo  EGA 36w6d presents to  with c/o decreased fetal movement. Upon arrival she is now feeling good movement. Denies contractions, VB or LOF.       Obstetric HPI:  Patient reports None contractions, decreased  fetal movement, No vaginal bleeding , No loss of fluid     This pregnancy has been complicated by   History of cervical incompetence with cervical cerclage-removed 8/10/23  AMA  Diet controlled GDM    OB History    Para Term  AB Living   9 6 4 2 2 6   SAB IAB Ectopic Multiple Live Births   2 0 0 0 6      # Outcome Date GA Lbr Anrdes/2nd Weight Sex Delivery Anes PTL Lv   9 Current            8 Term 19 39w0d / 00:19 2.693 kg (5 lb 15 oz) M Vag-Spont None N KOFI      Name: JAZLYN RODRIGUEZ      Apgar1: 9  Apgar5: 9   7 Term 17 39w5d  3.035 kg (6 lb 11.1 oz) M Vag-Spont None N KOFI      Name: JAZLYN RODRIGUEZ      Apgar1: 9  Apgar5: 9   6 Term 14 38w0d   M Vag-Spont   KOFI      Complications: Cervical cerclage suture present   5 Term 13 38w0d   M Vag-Spont   KOFI      Complications: Cervical cerclage suture present   4 SAB 12 20w0d   F SAB   ND   3  10/31/09 36w0d   F Vag-Spont   KOFI      Complications: Cervical cerclage suture present   2  05 36w0d   F Vag-Spont   KOFI      Complications: Cervical cerclage suture present   1 SAB 00 20w0d   M    ND     Past Medical History:   Diagnosis Date    Anemia     H/O cervical cerclage, currently pregnant     Incompetence of cervix 2016    cerlaged to be placed 2018--josh ordered    Incompetent cervix during second trimester, antepartum 2018    Miscarriage     PONV  (postoperative nausea and vomiting)      Past Surgical History:   Procedure Laterality Date    CERVICAL CERCLAGE      CERVICAL CERCLAGE N/A 7/11/2018    Procedure: CERCLAGE, CERVIX;  Surgeon: Keila Lema MD;  Location: [facility] OR;  Service: OB/GYN;  Laterality: N/A;    CERVICAL CERCLAGE N/A 3/1/2023    Procedure: CERCLAGE, CERVIX;  Surgeon: Keila Lema MD;  Location: [facility] OR;  Service: OB/GYN;  Laterality: N/A;       PTA Medications   Medication Sig    aspirin (ECOTRIN) 81 MG EC tablet Take 1 tablet (81 mg total) by mouth once daily.    blood sugar diagnostic Strp To check BG 4 times daily, to use with insurance preferred meter    blood-glucose meter kit To check BG 4 times daily, to use with insurance preferred meter    fluconazole (DIFLUCAN) 150 MG Tab Take 150 mg by mouth once.    lancets Misc To check BG 4 times daily, to use with insurance preferred meter    multivitamin capsule Take 1 capsule by mouth once daily.    ondansetron (ZOFRAN-ODT) 8 MG TbDL Take 1 tablet (8 mg total) by mouth every 12 (twelve) hours as needed (nausea).    polyethylene glycol (GLYCOLAX) 17 gram PwPk Take 17 g by mouth once daily.    progesterone (PROMETRIUM) 200 MG capsule Take 1 capsule (200 mg total) by mouth every evening.    promethazine (PHENERGAN) 25 MG tablet Take 1 tablet (25 mg total) by mouth every 6 (six) hours as needed for Nausea.       Review of patient's allergies indicates:  No Known Allergies     Family History       Problem Relation (Age of Onset)    Diabetes Mother    Heart failure Father          Tobacco Use    Smoking status: Never    Smokeless tobacco: Never   Substance and Sexual Activity    Alcohol use: Not Currently    Drug use: No    Sexual activity: Yes     Partners: Male     Review of Systems   Cardiovascular:  Positive for leg swelling.   Gastrointestinal:  Negative for abdominal pain.   Genitourinary:  Negative for vaginal bleeding and vaginal discharge.   All other systems reviewed  and are negative.     Objective:     Vital Signs (Most Recent):  Temp: 98.6 °F (37 °C) (23 1316)  Pulse: 67 (23 1316)  Resp: 20 (23 1316)  BP: 136/80 (23 1316)  SpO2: 97 % (23 1316) Vital Signs (24h Range):  Temp:  [98.6 °F (37 °C)] 98.6 °F (37 °C)  Pulse:  [67] 67  Resp:  [20] 20  SpO2:  [97 %] 97 %  BP: (136)/(80) 136/80        There is no height or weight on file to calculate BMI.    FHT: 130 Cat 1 (reassuring) reactive NST  TOCO:  none     Physical Exam:   Constitutional: She is oriented to person, place, and time. Vital signs are normal. She appears well-developed and well-nourished. She is cooperative.    HENT:   Head: Normocephalic.      Cardiovascular:  Normal rate, regular rhythm and normal heart sounds.      Exam reveals edema (1+ BLE).        Pulmonary/Chest: Effort normal.        Abdominal: Soft.   Gravid, non-tender             Musculoskeletal: Normal range of motion and moves all extremeties.       Neurological: She is alert and oriented to person, place, and time. She has normal strength.    Skin: Skin is warm and dry. Capillary refill takes less than 2 seconds.    Psychiatric: She has a normal mood and affect. Her speech is normal and behavior is normal. Judgment and thought content normal.        Cervix:  deferred     Significant Labs:  Lab Results   Component Value Date    GROUPTRH AB POS 2023    HEPBSAG Non-reactive 2023    STREPBCULT Normal cervicovaginal richmond present 08/10/2023       I have personallly reviewed all pertinent lab results from the last 24 hours.  Recent Lab Results       None          Assessment/Plan:     38 y.o. female  at 36w6d for:    * Decreased fetal movements in third trimester  Reactive NST, patient now reports good fetal movement  Will  d/c home and instructed to f/u in clinic this week with US  Continue [facility]        Maryana Madsen CNM  Obstetrics  [facility] - Labor & Delivery

## 2023-08-13 NOTE — PROCEDURES
Ebony Rodriguez is a 38 y.o. female patient.    Temp: 98.6 °F (37 °C) (08/13/23 1316)  Pulse: 67 (08/13/23 1316)  Resp: 20 (08/13/23 1316)  BP: 136/80 (08/13/23 1316)  SpO2: 97 % (08/13/23 1316)       Obtain Fetal nonstress test (NST)    Date/Time: 8/13/2023 1:42 PM    Performed by: Maryana Madsen CNM  Authorized by: Maryana Madsen CNM    Nonstress Test:     Variability:  6-25 BPM    Decelerations:  None    Accelerations:  15 bpm    Acoustic Stimulator: No      Baseline:  130    Uterine Irritability: No      Contractions:  Not present    Contraction Frequency:  None  Biophysical Profile:     Nonstress Test Interpretation: reactive      Overall Impression:  Reassuring  Post-procedure:     Patient tolerance:  Patient tolerated the procedure well with no immediate complications      8/13/2023

## 2023-08-13 NOTE — HPI
37yo  EGA 36w6d presents to  with c/o decreased fetal movement. Upon arrival she is now feeling good movement. Denies contractions, VB or LOF.

## 2023-08-13 NOTE — DISCHARGE SUMMARY
[facility] - Labor & Delivery  Obstetrics  Discharge Summary      Patient Name: Ebony Rodriguez  MRN: [id number]  Admission Date: 2023  Hospital Length of Stay: 0 days  Discharge Date and Time:  2023 1:41 PM  Attending Physician: Keila Lema MD   Discharging Provider: Maryana Madsen CNM   Primary Care Provider: Ruth, Primary Doctor    HPI: 37yo  EGA 36w6d presents to  with c/o decreased fetal movement. Upon arrival she is now feeling good movement. Denies contractions, VB or LOF.       FHT: 130 Cat 1 (reassuring)  TOCO:  none    * No surgery found *     Hospital Course:   Reactive NST, patient now reports good fetal movement  Will  d/c home and instructed to f/u in clinic this week with US  Continue [facility]           Final Active Diagnoses:    Diagnosis Date Noted POA    PRINCIPAL PROBLEM:  Decreased fetal movements in third trimester [O36.8130] 2023 Yes      Problems Resolved During this Admission:          Immunizations     None          This patient has no babies on file.  Pending Diagnostic Studies:     None          Discharged Condition: good    Disposition: Home or Self Care    Follow Up:   Follow-up Information     Sarah Morgan CNM Follow up in 1 week(s).    Specialty: Obstetrics and Gynecology  Why: routine prenatal visit  Contact information:  [address] [place] [address] [address] [address] [phone]                       Patient Instructions:      Diet Adult Regular     Notify your health care provider if you experience any of the following:  temperature >100.4     Notify your health care provider if you experience any of the following:  persistent nausea and vomiting or diarrhea     Notify your health care provider if you experience any of the following:  severe uncontrolled pain     Notify your health care provider if you experience any of the following:  difficulty breathing or increased cough     Notify your health care provider if you experience any of the following:   severe persistent headache     Notify your health care provider if you experience any of the following:  worsening rash     Notify your health care provider if you experience any of the following:  persistent dizziness, light-headedness, or visual disturbances     Notify your health care provider if you experience any of the following:  increased confusion or weakness     Activity as tolerated     Medications:  Current Discharge Medication List      CONTINUE these medications which have NOT CHANGED    Details   aspirin (ECOTRIN) 81 MG EC tablet Take 1 tablet (81 mg total) by mouth once daily.  Qty: 60 tablet, Refills: 2      blood sugar diagnostic Strp To check BG 4 times daily, to use with insurance preferred meter  Qty: 200 strip, Refills: 1    Associated Diagnoses: Diet controlled gestational diabetes mellitus (GDM) in third trimester      blood-glucose meter kit To check BG 4 times daily, to use with insurance preferred meter  Qty: 1 each, Refills: 0    Associated Diagnoses: Diet controlled gestational diabetes mellitus (GDM) in third trimester      fluconazole (DIFLUCAN) 150 MG Tab Take 150 mg by mouth once.      lancets Misc To check BG 4 times daily, to use with insurance preferred meter  Qty: 200 each, Refills: 1    Associated Diagnoses: Diet controlled gestational diabetes mellitus (GDM) in third trimester      multivitamin capsule Take 1 capsule by mouth once daily.      ondansetron (ZOFRAN-ODT) 8 MG TbDL Take 1 tablet (8 mg total) by mouth every 12 (twelve) hours as needed (nausea).  Qty: 30 tablet, Refills: 0    Associated Diagnoses: Nausea and vomiting in pregnancy      polyethylene glycol (GLYCOLAX) 17 gram PwPk Take 17 g by mouth once daily.  Qty: 20 packet, Refills: 1      progesterone (PROMETRIUM) 200 MG capsule Take 1 capsule (200 mg total) by mouth every evening.  Qty: 30 capsule, Refills: 11    Associated Diagnoses: History of cervical incompetence; Multigravida of advanced maternal age in  second trimester      promethazine (PHENERGAN) 25 MG tablet Take 1 tablet (25 mg total) by mouth every 6 (six) hours as needed for Nausea.  Qty: 30 tablet, Refills: 1             Maryana Madsen CNM  Obstetrics  [facility] - Labor & Delivery

## 2023-08-13 NOTE — TELEPHONE ENCOUNTER
States that she had cerclage removed on 8/10/23. Pt c/o decreased fetal movement this am. States that hands and feet are swollen and had some blurred vision earlier today. Advised to go to LD now per protocol. Verbalized understanding. Encounter routed to provider.       Reason for Disposition   New blurred vision or vision changes    Additional Information   Negative: Sounds like a life-threatening emergency to the triager   Negative: [1] SEVERE headache AND [2] not relieved with acetaminophen (e.g., Tylenol)    Protocols used: Pregnancy - Decreased or Abnormal Fetal Movement-A-AH

## 2023-08-14 LAB — BACTERIA SPEC AEROBE CULT: NORMAL

## 2023-08-16 ENCOUNTER — ROUTINE PRENATAL (OUTPATIENT)
Dept: OBSTETRICS AND GYNECOLOGY | Facility: CLINIC | Age: 38
End: 2023-08-16
Payer: MEDICAID

## 2023-08-16 ENCOUNTER — LAB VISIT (OUTPATIENT)
Dept: LAB | Facility: HOSPITAL | Age: 38
End: 2023-08-16
Attending: OBSTETRICS & GYNECOLOGY
Payer: MEDICAID

## 2023-08-16 ENCOUNTER — PATIENT MESSAGE (OUTPATIENT)
Dept: OBSTETRICS AND GYNECOLOGY | Facility: CLINIC | Age: 38
End: 2023-08-16

## 2023-08-16 ENCOUNTER — PROCEDURE VISIT (OUTPATIENT)
Dept: OBSTETRICS AND GYNECOLOGY | Facility: CLINIC | Age: 38
End: 2023-08-16
Payer: MEDICAID

## 2023-08-16 VITALS
BODY MASS INDEX: 28.16 KG/M2 | DIASTOLIC BLOOD PRESSURE: 86 MMHG | WEIGHT: 144.19 LBS | SYSTOLIC BLOOD PRESSURE: 122 MMHG

## 2023-08-16 DIAGNOSIS — O24.410 DIET CONTROLLED GESTATIONAL DIABETES MELLITUS (GDM) IN THIRD TRIMESTER: ICD-10-CM

## 2023-08-16 DIAGNOSIS — O09.523 MULTIGRAVIDA OF ADVANCED MATERNAL AGE IN THIRD TRIMESTER: ICD-10-CM

## 2023-08-16 DIAGNOSIS — O09.299 HISTORY OF CERCLAGE, CURRENTLY PREGNANT: ICD-10-CM

## 2023-08-16 DIAGNOSIS — O24.410 DIET CONTROLLED GESTATIONAL DIABETES MELLITUS (GDM) IN THIRD TRIMESTER: Primary | ICD-10-CM

## 2023-08-16 DIAGNOSIS — Z98.890 HISTORY OF CERCLAGE, CURRENTLY PREGNANT: ICD-10-CM

## 2023-08-16 PROCEDURE — 76816 OB US FOLLOW-UP PER FETUS: CPT | Mod: PBBFAC,PN | Performed by: OBSTETRICS & GYNECOLOGY

## 2023-08-16 PROCEDURE — 99213 PR OFFICE/OUTPT VISIT, EST, LEVL III, 20-29 MIN: ICD-10-PCS | Mod: TH,S$PBB,, | Performed by: MIDWIFE

## 2023-08-16 PROCEDURE — 99999 PR PBB SHADOW E&M-EST. PATIENT-LVL III: CPT | Mod: PBBFAC,,, | Performed by: MIDWIFE

## 2023-08-16 PROCEDURE — 99213 OFFICE O/P EST LOW 20 MIN: CPT | Mod: PBBFAC,TH,PN | Performed by: MIDWIFE

## 2023-08-16 PROCEDURE — 76819 FETAL BIOPHYS PROFIL W/O NST: CPT | Mod: 26,S$PBB,, | Performed by: OBSTETRICS & GYNECOLOGY

## 2023-08-16 PROCEDURE — 83036 HEMOGLOBIN GLYCOSYLATED A1C: CPT | Performed by: OBSTETRICS & GYNECOLOGY

## 2023-08-16 PROCEDURE — 76819 US OB/GYN PROCEDURE (VIEWPOINT): ICD-10-PCS | Mod: 26,S$PBB,, | Performed by: OBSTETRICS & GYNECOLOGY

## 2023-08-16 PROCEDURE — 76816 US OB/GYN PROCEDURE (VIEWPOINT): ICD-10-PCS | Mod: 26,S$PBB,, | Performed by: OBSTETRICS & GYNECOLOGY

## 2023-08-16 PROCEDURE — 99999 PR PBB SHADOW E&M-EST. PATIENT-LVL III: ICD-10-PCS | Mod: PBBFAC,,, | Performed by: MIDWIFE

## 2023-08-16 PROCEDURE — 36415 COLL VENOUS BLD VENIPUNCTURE: CPT | Mod: PO | Performed by: OBSTETRICS & GYNECOLOGY

## 2023-08-16 PROCEDURE — 99213 OFFICE O/P EST LOW 20 MIN: CPT | Mod: TH,S$PBB,, | Performed by: MIDWIFE

## 2023-08-16 RX ORDER — DEXTROSE 4 G
TABLET,CHEWABLE ORAL
Qty: 1 EACH | OUTPATIENT
Start: 2023-08-16

## 2023-08-16 NOTE — PROGRESS NOTES
38 y.o. female  at 37w2d   Reports + FM, denies VB, LOF or regular CTX  Increasing vaginal and pelvic pressure   TW.5 lbs   GBS negative, pt aware  VE per pt request, cervix very favorable     Diet controlled gestational diabetes mellitus (GDM) in third trimester  -     Hemoglobin A1C today   - does not have glucose logs with her today but states her readings have been wnl   - US today, cephalic presentation, ROBE wnl, MVP 4.4cm, EFW 11%ile 5lb 12oz, AC 12%ile, 88 BPP  - recommendations reviewed for delivery for GDM diet controlled between 39.0-40.6w. will discuss further at nv if IOL is desired at that time    History of cerclage, currently pregnant   - removed at last visit at 36.6w    Multigravida of advanced maternal age in third trimester  - baby ASA  - weekly BPPs     Reviewed warning signs, normal FKCs, labor precautions and how/when to call.  RTC x 1 wk, call or present sooner prn.

## 2023-08-17 ENCOUNTER — PATIENT MESSAGE (OUTPATIENT)
Dept: OBSTETRICS AND GYNECOLOGY | Facility: CLINIC | Age: 38
End: 2023-08-17
Payer: MEDICAID

## 2023-08-17 LAB
ESTIMATED AVG GLUCOSE: 82 MG/DL (ref 68–131)
HBA1C MFR BLD: 4.5 % (ref 4–5.6)

## 2023-08-17 RX ORDER — GUAIFENESIN 600 MG/1
1200 TABLET, EXTENDED RELEASE ORAL 2 TIMES DAILY
Qty: 30 TABLET | Refills: 0 | Status: ON HOLD | OUTPATIENT
Start: 2023-08-17 | End: 2023-08-20 | Stop reason: HOSPADM

## 2023-08-18 ENCOUNTER — NURSE TRIAGE (OUTPATIENT)
Dept: ADMINISTRATIVE | Facility: CLINIC | Age: 38
End: 2023-08-18
Payer: MEDICAID

## 2023-08-18 ENCOUNTER — HOSPITAL ENCOUNTER (INPATIENT)
Facility: HOSPITAL | Age: 38
LOS: 2 days | Discharge: HOME | End: 2023-08-20
Attending: OBSTETRICS & GYNECOLOGY | Admitting: OBSTETRICS & GYNECOLOGY
Payer: MEDICAID

## 2023-08-18 DIAGNOSIS — O47.9 THREATENED LABOR AT TERM: ICD-10-CM

## 2023-08-18 LAB
ABO + RH BLD: NORMAL
BASOPHILS # BLD AUTO: 0.01 K/UL (ref 0–0.2)
BASOPHILS NFR BLD: 0.2 % (ref 0–1.9)
BLD GP AB SCN CELLS X3 SERPL QL: NORMAL
DIFFERENTIAL METHOD: ABNORMAL
EOSINOPHIL # BLD AUTO: 0.1 K/UL (ref 0–0.5)
EOSINOPHIL NFR BLD: 1.3 % (ref 0–8)
ERYTHROCYTE [DISTWIDTH] IN BLOOD BY AUTOMATED COUNT: 12.9 % (ref 11.5–14.5)
HCT VFR BLD AUTO: 33.9 % (ref 37–48.5)
HGB BLD-MCNC: 11.6 G/DL (ref 12–16)
IMM GRANULOCYTES # BLD AUTO: 0.03 K/UL (ref 0–0.04)
IMM GRANULOCYTES NFR BLD AUTO: 0.5 % (ref 0–0.5)
LYMPHOCYTES # BLD AUTO: 1.1 K/UL (ref 1–4.8)
LYMPHOCYTES NFR BLD: 17.5 % (ref 18–48)
MCH RBC QN AUTO: 32.2 PG (ref 27–31)
MCHC RBC AUTO-ENTMCNC: 34.2 G/DL (ref 32–36)
MCV RBC AUTO: 94 FL (ref 82–98)
MONOCYTES # BLD AUTO: 0.5 K/UL (ref 0.3–1)
MONOCYTES NFR BLD: 7.8 % (ref 4–15)
NEUTROPHILS # BLD AUTO: 4.5 K/UL (ref 1.8–7.7)
NEUTROPHILS NFR BLD: 72.7 % (ref 38–73)
NRBC BLD-RTO: 0 /100 WBC
PLATELET # BLD AUTO: 127 K/UL (ref 150–450)
PMV BLD AUTO: 10 FL (ref 9.2–12.9)
RBC # BLD AUTO: 3.6 M/UL (ref 4–5.4)
SPECIMEN OUTDATE: NORMAL
WBC # BLD AUTO: 6.18 K/UL (ref 3.9–12.7)

## 2023-08-18 PROCEDURE — 86900 BLOOD TYPING SEROLOGIC ABO: CPT | Performed by: MIDWIFE

## 2023-08-18 PROCEDURE — 11000001 HC ACUTE MED/SURG PRIVATE ROOM

## 2023-08-18 PROCEDURE — 72200004 HC VAGINAL DELIVERY LEVEL I

## 2023-08-18 PROCEDURE — 85025 COMPLETE CBC W/AUTO DIFF WBC: CPT | Performed by: MIDWIFE

## 2023-08-18 PROCEDURE — 72100002 HC LABOR CARE, 1ST 8 HOURS

## 2023-08-18 PROCEDURE — 72100003 HC LABOR CARE, EA. ADDL. 8 HRS

## 2023-08-18 RX ORDER — ACETAMINOPHEN 500 MG
500 TABLET ORAL EVERY 6 HOURS PRN
Status: DISCONTINUED | OUTPATIENT
Start: 2023-08-18 | End: 2023-08-19

## 2023-08-18 RX ORDER — PROCHLORPERAZINE EDISYLATE 5 MG/ML
5 INJECTION INTRAMUSCULAR; INTRAVENOUS EVERY 6 HOURS PRN
Status: DISCONTINUED | OUTPATIENT
Start: 2023-08-18 | End: 2023-08-19

## 2023-08-18 RX ORDER — ONDANSETRON 8 MG/1
8 TABLET, ORALLY DISINTEGRATING ORAL EVERY 8 HOURS PRN
Status: DISCONTINUED | OUTPATIENT
Start: 2023-08-18 | End: 2023-08-19

## 2023-08-18 RX ORDER — SODIUM CHLORIDE, SODIUM LACTATE, POTASSIUM CHLORIDE, CALCIUM CHLORIDE 600; 310; 30; 20 MG/100ML; MG/100ML; MG/100ML; MG/100ML
INJECTION, SOLUTION INTRAVENOUS CONTINUOUS
Status: DISCONTINUED | OUTPATIENT
Start: 2023-08-18 | End: 2023-08-19

## 2023-08-18 NOTE — HOSPITAL COURSE
R/o labor  Serial blood pressures  NST   8/19/23:PPD1, doing well this am. Routine PP care  8/20/23 PPD #2, normal PP course, d/c home today

## 2023-08-18 NOTE — H&P
[facility]  Obstetrics  History & Physical    Patient Name: Ebony Rodriguez  MRN: [id number]  Admission Date: 2023  Primary Care Provider: Ruth, Primary Doctor    Subjective:     Principal Problem:Threatened labor at term    History of Present Illness:  38 y.o.  HEATH 2023 EGA 37w4d c/o contractions and swelling in feet       Obstetric HPI:  Patient reports irregular contractions, active fetal movement, No vaginal bleeding , No loss of fluid     This pregnancy has been complicated by:  History of cervical incompetence, cerclage placement, N&V, GDM, diet controlled, AMA, bv, yeast, UTI     OB History    Para Term  AB Living   9 6 4 2 2 6   SAB IAB Ectopic Multiple Live Births   2 0 0 0 6      # Outcome Date GA Lbr Andres/2nd Weight Sex Delivery Anes PTL Lv   9 Current            8 Term 19 39w0d / 00:19 2.693 kg (5 lb 15 oz) M Vag-Spont None N KOFI      Name: JAZLYN RODRIGUEZ      Apgar1: 9  Apgar5: 9   7 Term 17 39w5d  3.035 kg (6 lb 11.1 oz) M Vag-Spont None N KOFI      Name: JAZLYN RODRIGUEZ      Apgar1: 9  Apgar5: 9   6 Term 14 38w0d   M Vag-Spont   KOFI      Complications: Cervical cerclage suture present   5 Term 13 38w0d   M Vag-Spont   KOFI      Complications: Cervical cerclage suture present   4 SAB 12 20w0d   F SAB   ND   3  10/31/09 36w0d   F Vag-Spont   KOFI      Complications: Cervical cerclage suture present   2  05 36w0d   F Vag-Spont   KOFI      Complications: Cervical cerclage suture present   1 SAB 00 20w0d   M    ND     Past Medical History:   Diagnosis Date    Anemia     H/O cervical cerclage, currently pregnant     Incompetence of cervix 2016    cerlaged to be placed 2018--josh ordered    Incompetent cervix during second trimester, antepartum 2018    Miscarriage     PONV (postoperative nausea and vomiting)      Past Surgical History:   Procedure Laterality Date     CERVICAL CERCLAGE      CERVICAL CERCLAGE N/A 7/11/2018    Procedure: CERCLAGE, CERVIX;  Surgeon: Keila Lema MD;  Location: [facility] [place];  Service: OB/GYN;  Laterality: N/A;    CERVICAL CERCLAGE N/A 3/1/2023    Procedure: CERCLAGE, CERVIX;  Surgeon: Keila Lema MD;  Location: [facility] OR;  Service: OB/GYN;  Laterality: N/A;       PTA Medications   Medication Sig    aspirin (ECOTRIN) 81 MG EC tablet Take 1 tablet (81 mg total) by mouth once daily.    blood sugar diagnostic Strp To check BG 4 times daily, to use with insurance preferred meter    blood sugar diagnostic Strp 1 strip by Misc.(Non-Drug; Combo Route) route 4 (four) times daily.    blood-glucose meter kit To check BG 4 times daily, to use with insurance preferred meter    fluconazole (DIFLUCAN) 150 MG Tab Take 150 mg by mouth once.    guaiFENesin (MUCINEX) 600 mg 12 hr tablet Take 2 tablets (1,200 mg total) by mouth 2 (two) times daily. for 10 days    lancets [facility] To check BG 4 times daily, to use with insurance preferred meter    multivitamin capsule Take 1 capsule by mouth once daily.    ondansetron (ZOFRAN-ODT) 8 MG TbDL Take 1 tablet (8 mg total) by mouth every 12 (twelve) hours as needed (nausea).    polyethylene glycol (GLYCOLAX) 17 gram PwPk Take 17 g by mouth once daily.    progesterone (PROMETRIUM) 200 MG capsule Take 1 capsule (200 mg total) by mouth every evening.    promethazine (PHENERGAN) 25 MG tablet Take 1 tablet (25 mg total) by mouth every 6 (six) hours as needed for Nausea.       Review of patient's allergies indicates:  No Known Allergies     Family History       Problem Relation (Age of Onset)    Diabetes Mother    Heart failure Father          Tobacco Use    Smoking status: Never    Smokeless tobacco: Never   Substance and Sexual Activity    Alcohol use: Not Currently    Drug use: No    Sexual activity: Yes     Partners: Male     Review of Systems   Cardiovascular:  Positive for leg swelling.   Gastrointestinal:   Positive for abdominal pain.      Objective:     Vital Signs (Most Recent):    Vital Signs (24h Range):           There is no height or weight on file to calculate BMI.    FHT: 130 Cat 1 (reassuring)  TOCO:  irregular      Physical Exam:   Constitutional: She is oriented to person, place, and time. She appears well-developed and well-nourished.    HENT:   Head: Normocephalic.    Eyes: Conjunctivae are normal.      Pulmonary/Chest: Effort normal.        Abdominal: Soft.     Genitourinary:    Vagina and uterus normal.             Musculoskeletal: Normal range of motion.       Neurological: She is alert and oriented to person, place, and time.    Skin: Skin is warm and dry.    Psychiatric: She has a normal mood and affect. Her behavior is normal. Judgment and thought content normal.        Cervix:per RN  Dilation:  4  Effacement:  70  Station: -2  Presentation: Vertex     Significant Labs:  Lab Results   Component Value Date    GROUPTRH AB POS 2023    HEPBSAG Non-reactive 2023    STREPBCULT No Group B Streptococcus isolated 08/10/2023       I have personallly reviewed all pertinent lab results from the last 24 hours.    Assessment/Plan:     38 y.o. female  at 37w4d for:    * Threatened labor at term  R/o labor  Serial blood pressures  NST         Sarah Morgan CNM  Obstetrics  [facility]

## 2023-08-18 NOTE — SUBJECTIVE & OBJECTIVE
Obstetric HPI:  Patient reports irregular contractions, active fetal movement, No vaginal bleeding , No loss of fluid     This pregnancy has been complicated by:  History of cervical incompetence, cerclage placement, N&V, GDM, diet controlled, AMA, bv, yeast, UTI     OB History    Para Term  AB Living   9 6 4 2 2 6   SAB IAB Ectopic Multiple Live Births   2 0 0 0 6      # Outcome Date GA Lbr Andres/2nd Weight Sex Delivery Anes PTL Lv   9 Current            8 Term 19 39w0d / 00:19 2.693 kg (5 lb 15 oz) M Vag-Spont None N KOFI      Name: JAZLYN NICOLEECCA      Apgar1: 9  Apgar5: 9   7 Term 17 39w5d  3.035 kg (6 lb 11.1 oz) M Vag-Spont None N KOFI      Name: JAZLYN NICOLEECCA      Apgar1: 9  Apgar5: 9   6 Term 14 38w0d   M Vag-Spont   KOFI      Complications: Cervical cerclage suture present   5 Term 13 38w0d   M Vag-Spont   KOFI      Complications: Cervical cerclage suture present   4 SAB 12 20w0d   F SAB   ND   3  10/31/09 36w0d   F Vag-Spont   KOFI      Complications: Cervical cerclage suture present   2  05 36w0d   F Vag-Spont   KOFI      Complications: Cervical cerclage suture present   1 SAB 00 20w0d   M    ND     Past Medical History:   Diagnosis Date    Anemia     H/O cervical cerclage, currently pregnant     Incompetence of cervix 2016    cerlaged to be placed 2018--josh ordered    Incompetent cervix during second trimester, antepartum 2018    Miscarriage     PONV (postoperative nausea and vomiting)      Past Surgical History:   Procedure Laterality Date    CERVICAL CERCLAGE      CERVICAL CERCLAGE N/A 2018    Procedure: CERCLAGE, CERVIX;  Surgeon: Keila Lema MD;  Location: [facility] OR;  Service: OB/GYN;  Laterality: N/A;    CERVICAL CERCLAGE N/A 3/1/2023    Procedure: CERCLAGE, CERVIX;  Surgeon: Keila Lema MD;  Location: [facility] OR;  Service: OB/GYN;  Laterality: N/A;       PTA Medications   Medication Sig     aspirin (ECOTRIN) 81 MG EC tablet Take 1 tablet (81 mg total) by mouth once daily.    blood sugar diagnostic Strp To check BG 4 times daily, to use with insurance preferred meter    blood sugar diagnostic Strp 1 strip by Misc.(Non-Drug; Combo Route) route 4 (four) times daily.    blood-glucose meter kit To check BG 4 times daily, to use with insurance preferred meter    fluconazole (DIFLUCAN) 150 MG Tab Take 150 mg by mouth once.    guaiFENesin (MUCINEX) 600 mg 12 hr tablet Take 2 tablets (1,200 mg total) by mouth 2 (two) times daily. for 10 days    lancets Misc To check BG 4 times daily, to use with insurance preferred meter    multivitamin capsule Take 1 capsule by mouth once daily.    ondansetron (ZOFRAN-ODT) 8 MG TbDL Take 1 tablet (8 mg total) by mouth every 12 (twelve) hours as needed (nausea).    polyethylene glycol (GLYCOLAX) 17 gram PwPk Take 17 g by mouth once daily.    progesterone (PROMETRIUM) 200 MG capsule Take 1 capsule (200 mg total) by mouth every evening.    promethazine (PHENERGAN) 25 MG tablet Take 1 tablet (25 mg total) by mouth every 6 (six) hours as needed for Nausea.       Review of patient's allergies indicates:  No Known Allergies     Family History       Problem Relation (Age of Onset)    Diabetes Mother    Heart failure Father          Tobacco Use    Smoking status: Never    Smokeless tobacco: Never   Substance and Sexual Activity    Alcohol use: Not Currently    Drug use: No    Sexual activity: Yes     Partners: Male     Review of Systems   Cardiovascular:  Positive for leg swelling.   Gastrointestinal:  Positive for abdominal pain.      Objective:     Vital Signs (Most Recent):    Vital Signs (24h Range):           There is no height or weight on file to calculate BMI.    FHT: 130 Cat 1 (reassuring)  TOCO:  irregular      Physical Exam:   Constitutional: She is oriented to person, place, and time. She appears well-developed and well-nourished.    HENT:   Head: Normocephalic.    Eyes:  Conjunctivae are normal.      Pulmonary/Chest: Effort normal.        Abdominal: Soft.     Genitourinary:    Vagina and uterus normal.             Musculoskeletal: Normal range of motion.       Neurological: She is alert and oriented to person, place, and time.    Skin: Skin is warm and dry.    Psychiatric: She has a normal mood and affect. Her behavior is normal. Judgment and thought content normal.        Cervix:per RN  Dilation:  4  Effacement:  70  Station: -2  Presentation: Vertex     Significant Labs:  Lab Results   Component Value Date    GROUPTRH AB POS 01/06/2023    HEPBSAG Non-reactive 01/05/2023    STREPBCULT No Group B Streptococcus isolated 08/10/2023       I have personallly reviewed all pertinent lab results from the last 24 hours.

## 2023-08-18 NOTE — TELEPHONE ENCOUNTER
[place]    PCP:  None    She reports 4 cm dilated.  C/O swollen, tightening in the genital area, intermittent lower abdominal pain ~ 30 mins apart, runny nose, brown discharge, and coughing.  She is 37 wks pregnant.  Denies fever, lost mucus plug, and water break.  Per protocol, care advised is go to L&D now.  Pt VU.  Advised to call for worsening/questions/concerns.  VU.     Reason for Disposition   SEVERE headache that is not relieved with acetaminophen (e.g., Tylenol)    Additional Information   Negative: Passed out (i.e., fainted, collapsed and was not responding)   Negative: Shock suspected (e.g., cold/pale/clammy skin, too weak to stand, low BP, rapid pulse)   Negative: Difficult to awaken or acting confused (e.g., disoriented, slurred speech)   Negative: SEVERE abdominal pain (e.g., excruciating), constant, and present > 1 hour   Negative: SEVERE vaginal bleeding (e.g., continuous red blood from vagina, large blood clots)   Negative: Sounds like a life-threatening emergency to the triager   Negative: Vomiting red blood or black (coffee ground) material    Protocols used: Pregnancy - Abdominal Pain Greater Than 20 Weeks EGA-A-OH

## 2023-08-19 PROBLEM — O47.9 THREATENED LABOR AT TERM: Status: RESOLVED | Noted: 2017-05-17 | Resolved: 2023-08-19

## 2023-08-19 PROCEDURE — 59409 OBSTETRICAL CARE: CPT | Mod: AT,,, | Performed by: MIDWIFE

## 2023-08-19 PROCEDURE — 11000001 HC ACUTE MED/SURG PRIVATE ROOM

## 2023-08-19 PROCEDURE — 25000003 PHARM REV CODE 250: Performed by: MIDWIFE

## 2023-08-19 PROCEDURE — 59409 PR OBSTETRICAL CARE,VAG DELIV ONLY: ICD-10-PCS | Mod: AT,,, | Performed by: MIDWIFE

## 2023-08-19 PROCEDURE — 99231 SBSQ HOSP IP/OBS SF/LOW 25: CPT | Mod: ,,, | Performed by: ADVANCED PRACTICE MIDWIFE

## 2023-08-19 PROCEDURE — 99231 PR SUBSEQUENT HOSPITAL CARE,LEVL I: ICD-10-PCS | Mod: ,,, | Performed by: ADVANCED PRACTICE MIDWIFE

## 2023-08-19 RX ORDER — OXYTOCIN/RINGER'S LACTATE 30/500 ML
95 PLASTIC BAG, INJECTION (ML) INTRAVENOUS ONCE
Status: DISCONTINUED | OUTPATIENT
Start: 2023-08-19 | End: 2023-08-20 | Stop reason: HOSPADM

## 2023-08-19 RX ORDER — HYDROCODONE BITARTRATE AND ACETAMINOPHEN 7.5; 325 MG/1; MG/1
1 TABLET ORAL EVERY 4 HOURS PRN
Status: DISCONTINUED | OUTPATIENT
Start: 2023-08-19 | End: 2023-08-20 | Stop reason: HOSPADM

## 2023-08-19 RX ORDER — DIPHENOXYLATE HYDROCHLORIDE AND ATROPINE SULFATE 2.5; .025 MG/1; MG/1
2 TABLET ORAL EVERY 6 HOURS PRN
Status: DISCONTINUED | OUTPATIENT
Start: 2023-08-19 | End: 2023-08-20 | Stop reason: HOSPADM

## 2023-08-19 RX ORDER — OXYTOCIN 10 [USP'U]/ML
10 INJECTION, SOLUTION INTRAMUSCULAR; INTRAVENOUS ONCE AS NEEDED
Status: DISCONTINUED | OUTPATIENT
Start: 2023-08-19 | End: 2023-08-20 | Stop reason: HOSPADM

## 2023-08-19 RX ORDER — MISOPROSTOL 200 UG/1
800 TABLET ORAL ONCE AS NEEDED
Status: DISCONTINUED | OUTPATIENT
Start: 2023-08-19 | End: 2023-08-20 | Stop reason: HOSPADM

## 2023-08-19 RX ORDER — ONDANSETRON 8 MG/1
8 TABLET, ORALLY DISINTEGRATING ORAL EVERY 8 HOURS PRN
Status: DISCONTINUED | OUTPATIENT
Start: 2023-08-19 | End: 2023-08-20 | Stop reason: HOSPADM

## 2023-08-19 RX ORDER — SIMETHICONE 80 MG
1 TABLET,CHEWABLE ORAL EVERY 6 HOURS PRN
Status: DISCONTINUED | OUTPATIENT
Start: 2023-08-19 | End: 2023-08-20 | Stop reason: HOSPADM

## 2023-08-19 RX ORDER — PROCHLORPERAZINE EDISYLATE 5 MG/ML
5 INJECTION INTRAMUSCULAR; INTRAVENOUS EVERY 6 HOURS PRN
Status: DISCONTINUED | OUTPATIENT
Start: 2023-08-19 | End: 2023-08-20 | Stop reason: HOSPADM

## 2023-08-19 RX ORDER — TRANEXAMIC ACID 10 MG/ML
1000 INJECTION, SOLUTION INTRAVENOUS ONCE AS NEEDED
Status: DISCONTINUED | OUTPATIENT
Start: 2023-08-19 | End: 2023-08-20 | Stop reason: HOSPADM

## 2023-08-19 RX ORDER — DIPHENHYDRAMINE HYDROCHLORIDE 50 MG/ML
25 INJECTION INTRAMUSCULAR; INTRAVENOUS EVERY 4 HOURS PRN
Status: DISCONTINUED | OUTPATIENT
Start: 2023-08-19 | End: 2023-08-20 | Stop reason: HOSPADM

## 2023-08-19 RX ORDER — PRENATAL WITH FERROUS FUM AND FOLIC ACID 3080; 920; 120; 400; 22; 1.84; 3; 20; 10; 1; 12; 200; 27; 25; 2 [IU]/1; [IU]/1; MG/1; [IU]/1; MG/1; MG/1; MG/1; MG/1; MG/1; MG/1; UG/1; MG/1; MG/1; MG/1; MG/1
1 TABLET ORAL DAILY
Status: DISCONTINUED | OUTPATIENT
Start: 2023-08-19 | End: 2023-08-20 | Stop reason: HOSPADM

## 2023-08-19 RX ORDER — OXYTOCIN/RINGER'S LACTATE 30/500 ML
95 PLASTIC BAG, INJECTION (ML) INTRAVENOUS ONCE AS NEEDED
Status: DISCONTINUED | OUTPATIENT
Start: 2023-08-19 | End: 2023-08-20 | Stop reason: HOSPADM

## 2023-08-19 RX ORDER — SODIUM CHLORIDE 0.9 % (FLUSH) 0.9 %
10 SYRINGE (ML) INJECTION
Status: DISCONTINUED | OUTPATIENT
Start: 2023-08-19 | End: 2023-08-20 | Stop reason: HOSPADM

## 2023-08-19 RX ORDER — METHYLERGONOVINE MALEATE 0.2 MG/ML
200 INJECTION INTRAVENOUS
Status: DISCONTINUED | OUTPATIENT
Start: 2023-08-19 | End: 2023-08-20 | Stop reason: HOSPADM

## 2023-08-19 RX ORDER — HYDROCODONE BITARTRATE AND ACETAMINOPHEN 5; 325 MG/1; MG/1
1 TABLET ORAL EVERY 4 HOURS PRN
Status: DISCONTINUED | OUTPATIENT
Start: 2023-08-19 | End: 2023-08-20 | Stop reason: HOSPADM

## 2023-08-19 RX ORDER — HYDROCORTISONE 25 MG/G
CREAM TOPICAL 3 TIMES DAILY PRN
Status: DISCONTINUED | OUTPATIENT
Start: 2023-08-19 | End: 2023-08-20 | Stop reason: HOSPADM

## 2023-08-19 RX ORDER — IBUPROFEN 600 MG/1
600 TABLET ORAL EVERY 6 HOURS
Status: DISCONTINUED | OUTPATIENT
Start: 2023-08-19 | End: 2023-08-20 | Stop reason: HOSPADM

## 2023-08-19 RX ORDER — CARBOPROST TROMETHAMINE 250 UG/ML
250 INJECTION, SOLUTION INTRAMUSCULAR
Status: DISCONTINUED | OUTPATIENT
Start: 2023-08-19 | End: 2023-08-20 | Stop reason: HOSPADM

## 2023-08-19 RX ORDER — ACETAMINOPHEN 325 MG/1
650 TABLET ORAL EVERY 6 HOURS PRN
Status: DISCONTINUED | OUTPATIENT
Start: 2023-08-19 | End: 2023-08-20 | Stop reason: HOSPADM

## 2023-08-19 RX ORDER — DIPHENHYDRAMINE HCL 25 MG
25 CAPSULE ORAL EVERY 4 HOURS PRN
Status: DISCONTINUED | OUTPATIENT
Start: 2023-08-19 | End: 2023-08-20 | Stop reason: HOSPADM

## 2023-08-19 RX ORDER — OXYTOCIN/RINGER'S LACTATE 30/500 ML
334 PLASTIC BAG, INJECTION (ML) INTRAVENOUS ONCE AS NEEDED
Status: DISCONTINUED | OUTPATIENT
Start: 2023-08-19 | End: 2023-08-20 | Stop reason: HOSPADM

## 2023-08-19 RX ORDER — DOCUSATE SODIUM 100 MG/1
200 CAPSULE, LIQUID FILLED ORAL 2 TIMES DAILY PRN
Status: DISCONTINUED | OUTPATIENT
Start: 2023-08-19 | End: 2023-08-20 | Stop reason: HOSPADM

## 2023-08-19 RX ADMIN — IBUPROFEN 600 MG: 600 TABLET ORAL at 11:08

## 2023-08-19 RX ADMIN — IBUPROFEN 600 MG: 600 TABLET ORAL at 12:08

## 2023-08-19 RX ADMIN — IBUPROFEN 600 MG: 600 TABLET ORAL at 06:08

## 2023-08-19 RX ADMIN — IBUPROFEN 600 MG: 600 TABLET ORAL at 05:08

## 2023-08-19 RX ADMIN — PRENATAL VITAMINS-IRON FUMARATE 27 MG IRON-FOLIC ACID 0.8 MG TABLET 1 TABLET: at 09:08

## 2023-08-19 NOTE — L&D DELIVERY NOTE
"[facility] - Labor [facility] Delivery  Vaginal Delivery   Operative Note    SUMMARY     Normal spontaneous vaginal delivery of live female infant, was placed on mothers abdomen for skin to skin and bulb suctioning performed.  Infant delivered position OA over intact perineum.  Nuchal cord: No.    Spontaneous delivery of placenta and IV pitocin given noting good uterine tone.  No lacerations noted.  Patient tolerated delivery well. Sponge needle and lap counted correctly x2. EBL: 100ml.    Indications:  (normal spontaneous vaginal delivery)  Pregnancy complicated by:   Patient Active Problem List   Diagnosis    History of cervical incompetence    History of cerclage, currently pregnant    Cervical cerclage suture present in third trimester     (normal spontaneous vaginal delivery)    Single liveborn    Nausea and vomiting in pregnancy    Multigravida of advanced maternal age in third trimester    Abnormal glucose tolerance in pregnancy    Diet controlled gestational diabetes mellitus (GDM) in third trimester    BV (bacterial vaginosis)    Yeast infection of the vagina    Decreased fetal movements in third trimester     Admitting GA: 37w4d    Delivery Information for Fredi Rodriguez    Birth information:  YOB: 2023   Time of birth: 10:46 PM   Sex: female   Head Delivery Date/Time: 2023 10:46 PM   Delivery type: Vaginal, Spontaneous   Gestational Age: 37w4d        Delivery Providers    Delivering clinician: Sarah Morgan CNM   Provider Role    Arianna Mcclure RN Registered Nurse    Maryana De Luna RN Registered Nurse              Measurements    Weight: 2560 g  Weight (lbs): 5 lb 10.3 oz  Length: 47 cm  Length (in): 18.5"  Head circumference: 32.5 cm  Chest circumference: 30 cm  Abdominal girth: 28 cm         Apgars    Living status: Living  Apgar Component Scores:  1 min.:  5 min.:  10 min.:  15 min.:  20 min.:    Skin color:  1  1       Heart rate:  2  2       Reflex irritability:  2  2     "   Muscle tone:  2  2       Respiratory effort:  2  2       Total:  9  9       Apgars assigned by: NIRMALA BENTLEY         Operative Delivery    Forceps attempted?: No  Vacuum extractor attempted?: No         Shoulder Dystocia    Shoulder dystocia present?: No           Presentation    Presentation: Vertex  Position: Occiput Anterior           Interventions/Resuscitation    Method: Bulb Suctioning, Tactile Stimulation       Cord    Vessels: 3 vessels  Complications: None  Delayed Cord Clamping?: Yes  Cord Clamped Date/Time: 2023 10:48 PM  Cord Blood Disposition: Discarded  Gases Sent?: No  Stem Cell Collection (by MD): No       Placenta    Placenta delivery date/time: 2023  Placenta removal: Spontaneous  Placenta appearance: Intact  Placenta disposition: Discarded           Labor Events:       labor: No     Labor Onset Date/Time:         Dilation Complete Date/Time: 2023 22:20     Start Pushing Date/Time: 2023 22:20       Start Pushing Date/Time: 2023 22:20     Rupture Date/Time: 23         Rupture type: ARM (Artificial Rupture)         Fluid Amount:       Fluid Color: Clear               steroids: None     Antibiotics given for GBS: No     Induction: none     Indications for induction:        Augmentation: amniotomy     Indications for augmentation: Ineffective Contraction Pattern     Labor complications: None     Additional complications:          Cervical ripening:                     Delivery:      Episiotomy: None     Indication for Episiotomy:       Perineal Lacerations: None Repaired:      Periurethral Laceration:   Repaired:     Labial Laceration:   Repaired:     Sulcus Laceration:   Repaired:     Vaginal Laceration:   Repaired:     Cervical Laceration:   Repaired:     Repair suture: None     Repair # of packets: 0     Last Value - EBL - Nursing (mL):       Sum - EBL - Nursing (mL): 0     Last Value - EBL - Anesthesia (mL):      Calculated QBL (mL): 100       Vaginal Sweep Performed: No     Surgicount Correct: Yes     Vaginal Packing: No Quantity:       Other providers:       Anesthesia    Method: None          Details (if applicable):  Trial of Labor      Categorization:      Priority:     Indications for :     Incision Type:       Additional  information:  Forceps:    Vacuum:    Breech:    Observed anomalies    Other (Comments):

## 2023-08-19 NOTE — PLAN OF CARE
POC reviewed with pt. Pt verbalizes understanding of POC. No questions at this time.  NADN.  Pt remains Afebrile.   Voids Spontaneously.   Ambulates independently.   Continuous Fetal Monitoring.   Spouse at bedside.   Pt remains free of falls.   No complaints at this time.  Safety measures in place.

## 2023-08-19 NOTE — PLAN OF CARE
Patient afebrile and had no falls this shift. Fundus firm without massage and below umbilicus. Bleeding light, no clots passed this shift. Voids spontaneously. Ambulates independently. Pain well controlled with oral pain medication. Vital signs stable at this time. Bonding well with infant; responds to infant cues and participates in infant care. Call light in reach, encourage usage if needed. No problems indicated at this time.

## 2023-08-19 NOTE — LACTATION NOTE
Lactation Rounds:    Mother reports that she has been having difficulty latching infant. States that she just fed infant an hour ago and will call for assistance when needed.

## 2023-08-19 NOTE — SUBJECTIVE & OBJECTIVE
Interval History:     She is doing well this morning. She is tolerating a regular diet without nausea or vomiting. She is voiding spontaneously. She is ambulating. She has passed flatus, and has not a BM. Vaginal bleeding is mild. She denies fever or chills. Abdominal pain is mild and controlled with oral medications. She Is not breastfeeding. She desires circumcision for her male baby: not applicable.    Objective:     Vital Signs (Most Recent):  Temp: 98.3 °F (36.8 °C) (08/19/23 0429)  Pulse: 64 (08/19/23 0429)  Resp: 18 (08/19/23 0429)  BP: 118/77 (08/19/23 0429)  SpO2: 98 % (08/19/23 0429) Vital Signs (24h Range):  Temp:  [98.1 °F (36.7 °C)-98.8 °F (37.1 °C)] 98.3 °F (36.8 °C)  Pulse:  [] 64  Resp:  [18] 18  SpO2:  [95 %-100 %] 98 %  BP: ()/() 118/77        There is no height or weight on file to calculate BMI.      Intake/Output Summary (Last 24 hours) at 8/19/2023 0855  Last data filed at 8/19/2023 0115  Gross per 24 hour   Intake --   Output 400 ml   Net -400 ml         Significant Labs:  Lab Results   Component Value Date    GROUPTRH AB POS 08/18/2023    HEPBSAG Non-reactive 01/05/2023    STREPBCULT No Group B Streptococcus isolated 08/10/2023     Recent Labs   Lab 08/18/23  1820   HGB 11.6*   HCT 33.9*       I have personallly reviewed all pertinent lab results from the last 24 hours.    Physical Exam:   Constitutional: She is oriented to person, place, and time. She appears well-developed and well-nourished.    HENT:   Head: Normocephalic.      Cardiovascular:  Normal rate and regular rhythm.             Pulmonary/Chest: Effort normal.        Abdominal: Soft.     Genitourinary:    Vagina and uterus normal.             Musculoskeletal: Normal range of motion and moves all extremeties.       Neurological: She is alert and oriented to person, place, and time. She has normal reflexes.    Skin: Skin is warm and dry.        Review of Systems

## 2023-08-19 NOTE — PROGRESS NOTES
[facility] ([facility])  Obstetrics  Postpartum Progress Note    Patient Name: Ebony Rodriguez  MRN: [id number]  Admission Date: 2023  Hospital Length of Stay: 1 days  Attending Physician: Rosie Galvan MD  Primary Care Provider: Ruth, Primary Doctor    Subjective:     Principal Problem: (normal spontaneous vaginal delivery)    Hospital Course:  R/o labor  Serial blood pressures  NST   23:PPD1, doing well this am. Routine PP care      Interval History:     She is doing well this morning. She is tolerating a regular diet without nausea or vomiting. She is voiding spontaneously. She is ambulating. She has passed flatus, and has not a BM. Vaginal bleeding is mild. She denies fever or chills. Abdominal pain is mild and controlled with oral medications. She Is not breastfeeding. She desires circumcision for her male baby: not applicable.    Objective:     Vital Signs (Most Recent):  Temp: 98.3 °F (36.8 °C) (23)  Pulse: 64 (23)  Resp: 18 (23)  BP: 118/77 (23)  SpO2: 98 % (23) Vital Signs (24h Range):  Temp:  [98.1 °F (36.7 °C)-98.8 °F (37.1 °C)] 98.3 °F (36.8 °C)  Pulse:  [] 64  Resp:  [18] 18  SpO2:  [95 %-100 %] 98 %  BP: ()/() 118/77        There is no height or weight on file to calculate BMI.      Intake/Output Summary (Last 24 hours) at 2023 0855  Last data filed at 2023 0115  Gross per 24 hour   Intake --   Output 400 ml   Net -400 ml         Significant Labs:  Lab Results   Component Value Date    GROUPTRH AB POS 2023    HEPBSAG Non-reactive 2023    STREPBCULT No Group B Streptococcus isolated 08/10/2023     Recent Labs   Lab 23  1820   HGB 11.6*   HCT 33.9*       I have personallly reviewed all pertinent lab results from the last 24 hours.    Physical Exam:   Constitutional: She is oriented to person, place, and time. She appears well-developed and well-nourished.    HENT:   Head:  Normocephalic.      Cardiovascular:  Normal rate and regular rhythm.             Pulmonary/Chest: Effort normal.        Abdominal: Soft.     Genitourinary:    Vagina and uterus normal.             Musculoskeletal: Normal range of motion and moves all extremeties.       Neurological: She is alert and oriented to person, place, and time. She has normal reflexes.    Skin: Skin is warm and dry.        Review of Systems    Assessment/Plan:     38 y.o. female  for:    *  (normal spontaneous vaginal delivery)  Routine postpartum care    Single liveborn  Viable female infant in care of peds        Disposition: As patient meets milestones, will plan to discharge tomorrow.    Gabriella Rich CNM  Obstetrics  [facility] ([facility])

## 2023-08-19 NOTE — LACTATION NOTE
Lactation called to room for assistance:    Infant sleeping on mother's chest; reports last feeding at noon. Mother would like to latch baby at this time. Assisted mother with latching infant to the left breast in cross cradle hold. Infant latches and sucks once; mother states that latch is painful, so infant removed from breast. Small drop of colostrum expressed to entice infant to latch; baby appears asleep.    Baby switched to right breast in cross cradle hold. Baby is showing feeding cues. Reviewed deep asymmetric latch and proper positioning. Infant latches deeply, sucks 3-4 times and hold nipple in mouth. Mother reports that latch is comfortable. Infant appears asleep despite stimulation and breast compressions; infant removed from breast. Mother states that she will just formula feed via bottle despite wanting infant to latch. Support provided and discussed that infant is still within 24 hours old and her sleepiness is common. Encouraged lots of skin to skin and offered a breast pump. Mother agreeable.     Cliqhony breast pump set up at bedside.  Instructed on proper usage and to adjust suction according to comfort level. Used 24 mm flange, but after pumping, encouraged mother to use 27 mm flange (due to nipple appearance) to assess comfort. Reviewed frequency and duration of pumping in order to promote and maintain full milk supply. Hands-on pumping technique reviewed. Encouraged hand expression after. Instructed on proper cleaning of breast pump parts. Reviewed proper milk handling, collection, storage, and transportation. Voices understanding.    Lactation packet reviewed for days 1-2.  Discussed early feeding cues and encouraged mother to feed baby in response to those cues. Encouraged on demand feedings and skin to skin.  Reviewed normal feeding expectations of 8 or more feedings per 24 hour period, cues that babies use to signal hunger and satiety and cluster feeding. Discussed the adequacy of  colostrum and baby belly size for the first 3 days of life along with expected output.     Mother denies any further lactation needs or concerns at this time. Encouraged mother to call for assistance when desired or when infant is showing signs of hunger. Lactation availability discussed. Mother verbalizes understanding of all education and counseling.

## 2023-08-20 VITALS
HEART RATE: 63 BPM | TEMPERATURE: 98 F | RESPIRATION RATE: 18 BRPM | DIASTOLIC BLOOD PRESSURE: 78 MMHG | SYSTOLIC BLOOD PRESSURE: 141 MMHG | OXYGEN SATURATION: 98 %

## 2023-08-20 PROBLEM — N76.0 BV (BACTERIAL VAGINOSIS): Status: RESOLVED | Noted: 2023-07-26 | Resolved: 2023-08-20

## 2023-08-20 PROBLEM — Z98.890 HISTORY OF CERCLAGE, CURRENTLY PREGNANT: Status: RESOLVED | Noted: 2018-05-02 | Resolved: 2023-08-20

## 2023-08-20 PROBLEM — O36.8130 DECREASED FETAL MOVEMENTS IN THIRD TRIMESTER: Status: RESOLVED | Noted: 2023-08-13 | Resolved: 2023-08-20

## 2023-08-20 PROBLEM — O09.299 HISTORY OF CERCLAGE, CURRENTLY PREGNANT: Status: RESOLVED | Noted: 2018-05-02 | Resolved: 2023-08-20

## 2023-08-20 PROBLEM — B96.89 BV (BACTERIAL VAGINOSIS): Status: RESOLVED | Noted: 2023-07-26 | Resolved: 2023-08-20

## 2023-08-20 PROBLEM — O34.33 CERVICAL CERCLAGE SUTURE PRESENT IN THIRD TRIMESTER: Status: RESOLVED | Noted: 2018-07-11 | Resolved: 2023-08-20

## 2023-08-20 PROBLEM — O09.523 MULTIGRAVIDA OF ADVANCED MATERNAL AGE IN THIRD TRIMESTER: Status: RESOLVED | Noted: 2023-03-08 | Resolved: 2023-08-20

## 2023-08-20 PROBLEM — B37.31 YEAST INFECTION OF THE VAGINA: Status: RESOLVED | Noted: 2023-07-26 | Resolved: 2023-08-20

## 2023-08-20 PROBLEM — O99.810 ABNORMAL GLUCOSE TOLERANCE IN PREGNANCY: Status: RESOLVED | Noted: 2023-06-23 | Resolved: 2023-08-20

## 2023-08-20 PROBLEM — O21.9 NAUSEA AND VOMITING IN PREGNANCY: Status: RESOLVED | Noted: 2023-01-23 | Resolved: 2023-08-20

## 2023-08-20 PROCEDURE — 25000003 PHARM REV CODE 250: Performed by: MIDWIFE

## 2023-08-20 PROCEDURE — 99238 PR HOSPITAL DISCHARGE DAY,<30 MIN: ICD-10-PCS | Mod: ,,, | Performed by: MIDWIFE

## 2023-08-20 PROCEDURE — 99238 HOSP IP/OBS DSCHRG MGMT 30/<: CPT | Mod: ,,, | Performed by: MIDWIFE

## 2023-08-20 RX ADMIN — IBUPROFEN 600 MG: 600 TABLET ORAL at 06:08

## 2023-08-20 RX ADMIN — IBUPROFEN 600 MG: 600 TABLET ORAL at 12:08

## 2023-08-20 RX ADMIN — DOCUSATE SODIUM 200 MG: 100 CAPSULE, LIQUID FILLED ORAL at 08:08

## 2023-08-20 RX ADMIN — PRENATAL VITAMINS-IRON FUMARATE 27 MG IRON-FOLIC ACID 0.8 MG TABLET 1 TABLET: at 08:08

## 2023-08-20 NOTE — PROGRESS NOTES
[facility] ([facility])  Obstetrics  Postpartum Progress Note    Patient Name: Ebony Rodriguez  MRN: [id number]  Admission Date: 2023  Hospital Length of Stay: 2 days  Attending Physician: Rosie Galvan MD  Primary Care Provider: Ruth, Primary Doctor    Subjective:     Principal Problem: (normal spontaneous vaginal delivery)    Hospital Course:  R/o labor  Serial blood pressures  NST   23:PPD1, doing well this am. Routine PP care  23 PPD #2, normal PP course, d/c home today      Interval History: PPD #2    She is doing well this morning. She is tolerating a regular diet without nausea or vomiting. She is voiding spontaneously. She is ambulating. She has passed flatus, and has not a BM. Vaginal bleeding is mild. She denies fever or chills. Abdominal pain is mild and controlled with oral medications.  She desires circumcision for her male baby: not applicable.    Objective:     Vital Signs (Most Recent):  Temp: 98.2 °F (36.8 °C) (23 1159)  Pulse: 63 (23 1159)  Resp: 18 (23 1159)  BP: (!) 141/78 (23 1159)  SpO2: 98 % (23 0429) Vital Signs (24h Range):  Temp:  [97.8 °F (36.6 °C)-98.7 °F (37.1 °C)] 98.2 °F (36.8 °C)  Pulse:  [56-99] 63  Resp:  [18] 18  BP: (106-141)/(68-81) 141/78        There is no height or weight on file to calculate BMI.      Intake/Output Summary (Last 24 hours) at 2023 1248  Last data filed at 2023 1814  Gross per 24 hour   Intake --   Output 200 ml   Net -200 ml         Significant Labs:  Lab Results   Component Value Date    GROUPTRH AB POS 2023    HEPBSAG Non-reactive 2023    STREPBCULT No Group B Streptococcus isolated 08/10/2023     Recent Labs   Lab 23  1820   HGB 11.6*   HCT 33.9*       I have personallly reviewed all pertinent lab results from the last 24 hours.  Recent Lab Results       None            Physical Exam:   Constitutional: She is oriented to person, place, and time. She appears well-developed  and well-nourished.    HENT:   Head: Normocephalic.      Cardiovascular:  Normal rate.             Pulmonary/Chest: Effort normal.        Abdominal: Soft.     Genitourinary: There is vaginal discharge in the vagina.    Genitourinary Comments:  lochia small/mod per pt             Musculoskeletal: Normal range of motion and moves all extremeties.       Neurological: She is alert and oriented to person, place, and time.    Skin: Skin is warm and dry.    Psychiatric: She has a normal mood and affect. Her behavior is normal. Judgment and thought content normal.       Review of Systems   Eyes:  Negative for visual disturbance.   Gastrointestinal:  Negative for abdominal pain.   Genitourinary:  Positive for vaginal bleeding.   Neurological:  Negative for headaches.   All other systems reviewed and are negative.      Assessment/Plan:     38 y.o. female  for:    *  (normal spontaneous vaginal delivery)  Routine postpartum care    Single liveborn  Viable female infant in care of peds        Disposition: As patient meets milestones, will plan to discharge today.    Glen Croft CNM  Obstetrics  [facility] ([facility])

## 2023-08-20 NOTE — DISCHARGE INSTRUCTIONS
"Mother Self Care:    Activity: Avoid strenuous exercise and get adequate rest.  No driving until the physician consent given.  Emotional Changes: Most women find birth to be a time of great emotional upheaval.  Sense of loss, mood swings, fatigue, anxiety, and feeling "let down" are common.  If feelings worsen or last more than a week, call your physician.  Breast Care/Breastfeeding: Wear a bra for comfort.  Keep nipples dry and apply your own breast milk or lanolin cream as needed for soreness.  Engorgement can be relieved with warm, moist heat before feedings.  You may also take Ibuprofen.  Breast Care/Bottle Feeding: Wear support bra 24 hours a day for one week.  Avoid stimulation to breasts.  You may use ice packs for discomfort.  Faustino-Care/Vaginal Bleeding: Remember to use your faustino-bottle after urinating.  Your flow will change from red, to pink, to yellow/white color over a period of 2 weeks.  Menstruation will return in 3-8 weeks, or longer if breastfeeding.  Episiotomy Vaginal Delivery: Stitches will dissolve within 10 days to 3 weeks.  Warm baths, tucks, and dermoplast will promote healing.  Avoid bubble baths or strong soaps.   Section/Tubal Ligation: Keep incision clean and dry. You may shower, but avoid baths. Please continue to use Nozin twice a day for 7 days after surgery. Ensure you attend your 1 week post op visit to have your dressing removed. Use antibacterial soap to wash your entire body until directed otherwise by a Physician, it is very important to shower daily.   Sexual Activity/Pelvic Rest: No sexual activity, tampons, or douching until your physician gives you consent.  Diet: Continue to eat from the five basic food groups, including plenty of protein, fruits, vegetables, and whole grains.  Limit empty calories and high fat foods.  Drink enough fluids to satisfy thirst and add an extra 500 calories for breastfeeding.  Constipation/Hemorrhoids: Drink plenty of water.  You may take " a stool softener or natural laxative (Metamucil). You may use tucks or hemorrhoid ointment and soak in a warm tub.    CALL YOUR OB DOCTOR IF ANY OF THE FOLLOWING OCCURS:  *Heavy bleeding - saturating a pad an hour or passing any large (2-3 inches in size) blood clots.  *Any pain, redness, or tenderness in lower leg.  *You cannot care for yourself or your baby.  *Any signs of infection-      - Temperature greater than 100.5 degrees F      - Foul smelling vaginal discharge and/or incisional drainage      - Increased episiotomy or incisional pain      - Hot, hard, red or sore area on breast      - Flu-like symptoms      - Any urgency, frequency or burning with urination    Return To the Hospital for further Evaluation:  Headache not relieved by tylenol or ibuprofen  Blurry vision, double vision, seeing spots, or flashing lights  Feeling faint or passing out  Right epigastric pain  Difficulty breathing  Swelling in hands, face, or feet  Any of these symptoms accompanied by nausea/vomiting  Gaining more than 5 pounds in one week  Seizures  These symptoms could be an indication of elevated blood pressure.     For patients that were treated for high blood pressure during pregnancy and or your hospital stay you will need a blood pressure check three days after you leave the hospital. Your nursing staff will assist you in an appointment if needed. If you have Connected Mom you may use your blood pressure cuff and report any readings 140/90 to your provider immediately.       If you have any questions that need to be answered immediately please call the Labor & Delivery Unit at [phone] and ask to speak to a nurse.      Please see [facility] BLUE folder for additional information and handouts.

## 2023-08-20 NOTE — PLAN OF CARE
Patient afebrile and had no falls this shift. Fundus firm without massage and 2 cm below umbilicus. Bleeding light, no clots passed this shift. Voids spontaneously. Ambulates independently. Pain well controlled with oral pain medication. Vital signs stable at this time. Bonding well with infant; responds to infant cues and participates in infant care. Call light in reach, encourage usage as needed. No problem indicated at this time.

## 2023-08-20 NOTE — SUBJECTIVE & OBJECTIVE
Interval History: PPD #2    She is doing well this morning. She is tolerating a regular diet without nausea or vomiting. She is voiding spontaneously. She is ambulating. She has passed flatus, and has not a BM. Vaginal bleeding is mild. She denies fever or chills. Abdominal pain is mild and controlled with oral medications.  She desires circumcision for her male baby: not applicable.    Objective:     Vital Signs (Most Recent):  Temp: 98.2 °F (36.8 °C) (08/20/23 1159)  Pulse: 63 (08/20/23 1159)  Resp: 18 (08/20/23 1159)  BP: (!) 141/78 (08/20/23 1159)  SpO2: 98 % (08/19/23 0429) Vital Signs (24h Range):  Temp:  [97.8 °F (36.6 °C)-98.7 °F (37.1 °C)] 98.2 °F (36.8 °C)  Pulse:  [56-99] 63  Resp:  [18] 18  BP: (106-141)/(68-81) 141/78        There is no height or weight on file to calculate BMI.      Intake/Output Summary (Last 24 hours) at 8/20/2023 1248  Last data filed at 8/19/2023 1814  Gross per 24 hour   Intake --   Output 200 ml   Net -200 ml         Significant Labs:  Lab Results   Component Value Date    GROUPTRH AB POS 08/18/2023    HEPBSAG Non-reactive 01/05/2023    STREPBCULT No Group B Streptococcus isolated 08/10/2023     Recent Labs   Lab 08/18/23  1820   HGB 11.6*   HCT 33.9*       I have personallly reviewed all pertinent lab results from the last 24 hours.  Recent Lab Results       None            Physical Exam:   Constitutional: She is oriented to person, place, and time. She appears well-developed and well-nourished.    HENT:   Head: Normocephalic.      Cardiovascular:  Normal rate.             Pulmonary/Chest: Effort normal.        Abdominal: Soft.     Genitourinary: There is vaginal discharge in the vagina.    Genitourinary Comments:  lochia small/mod per pt             Musculoskeletal: Normal range of motion and moves all extremeties.       Neurological: She is alert and oriented to person, place, and time.    Skin: Skin is warm and dry.    Psychiatric: She has a normal mood and affect. Her  behavior is normal. Judgment and thought content normal.       Review of Systems   Eyes:  Negative for visual disturbance.   Gastrointestinal:  Negative for abdominal pain.   Genitourinary:  Positive for vaginal bleeding.   Neurological:  Negative for headaches.   All other systems reviewed and are negative.

## 2023-08-20 NOTE — LACTATION NOTE
Mother reports that she is latching infant sometimes and bottle feeding formula also. Mother denies need for assistance latching.    Mother anticipates discharge home today. Reviewed signs of good attachment. Reviewed breast massage and compression during feedings and indications for use. Reviewed signs of effective milk transfer and instructed to call pediatrician and lactation if signs not present. Discussed expected feeding and output pattern for days of life 2, 3, 4, & 5+; mother instructed to call pediatrician and lactation if infant is not meeting feeding and output goals.     Reviewed signs of engorgement and expectant management. Reviewed signs of mastitis and instructed mother to call OB provider and lactation if any signs present. Discussed proper use of First Alert Form. Reviewed proper milk handling, collection and storage guidelines. Reviewed nursing diet and nutrition. Discussed resources for medication safety while breastfeeding. Reviewed available outpatient lactation resources.     Mother verbalizes understanding of all education and counseling; she denies any further lactation needs or concerns at this time. Encouraged mother to contact lactation with any questions, concerns, or problems, contact number provided.

## 2023-08-20 NOTE — DISCHARGE SUMMARY
"[facility] ([facility])  Obstetrics  Discharge Summary      Patient Name: Ebony Rodriguez  MRN: [id number]  Admission Date: 2023  Hospital Length of Stay: 2 days  Discharge Date and Time: 23  Attending Physician: Rosie Galvan MD   Discharging Provider: Glen Croft CNM   Primary Care Provider: Ruth, Primary Doctor    HPI: 38 y.o.  HEATH 2023 EGA 37w4d c/o contractions and swelling in feet           * No surgery found *     Hospital Course:   R/o labor  Serial blood pressures  NST   23:PPD1, doing well this am. Routine PP care  23 PPD #2, normal PP course, d/c home today           Final Active Diagnoses:    Diagnosis Date Noted POA    PRINCIPAL PROBLEM:   (normal spontaneous vaginal delivery) [O80] 2019 Not Applicable    Single liveborn [Z38.2] 2019 No      Problems Resolved During this Admission:    Diagnosis Date Noted Date Resolved POA    Threatened labor at term [O47.9] 2017 Yes        Significant Diagnostic Studies: N/A      Feeding Method: bottle    Immunizations     Date Immunization Status Dose Route/Site Given by    23 0124 MMR Incomplete 0.5 mL Subcutaneous/     23 0124 Tdap Incomplete 0.5 mL Intramuscular/           Delivery:    Episiotomy: None   Lacerations: None   Repair suture: None   Repair # of packets: 0   Blood loss (ml):       Birth information:  YOB: 2023   Time of birth: 10:46 PM   Sex: female   Delivery type: Vaginal, Spontaneous   Gestational Age: 37w4d     Measurements    Weight: 2560 g  Weight (lbs): 5 lb 10.3 oz  Length: 47 cm  Length (in): 18.5"  Head circumference: 32.5 cm  Chest circumference: 30 cm  Abdominal girth: 28 cm         Delivery Clinician: Delivery Providers    Delivering clinician: Sarah Morgan CNM   Provider Role    Arianna Mcclure RN Registered Nurse    Maryana De Luna RN Registered Nurse           Additional  information:  Forceps:    Vacuum:    Breech:  "   Observed anomalies      Living?:     Apgars    Living status: Living  Apgar Component Scores:  1 min.:  5 min.:  10 min.:  15 min.:  20 min.:    Skin color:  1  1       Heart rate:  2  2       Reflex irritability:  2  2       Muscle tone:  2  2       Respiratory effort:  2  2       Total:  9  9       Apgars assigned by: NIRMALA BENTLEY         Placenta: Delivered:       appearance    Pending Diagnostic Studies:     None          Discharged Condition: stable    Disposition: Home or Self Care    Follow Up:   Follow-up Information     Tina Love MD Follow up in 2 week(s).    Specialties: Obstetrics and Gynecology, Obstetrics  Why: BTL consult  Contact information:  [address] [place] [address] DR Pia SPENCER [id number] [phone]                       Patient Instructions:      Diet Adult Regular     Pelvic Rest     No driving until:     Notify your health care provider if you experience any of the following:  temperature >100.4     Notify your health care provider if you experience any of the following:  redness, tenderness, or signs of infection (pain, swelling, redness, odor or green/yellow discharge around incision site)     Notify your health care provider if you experience any of the following:  severe persistent headache     Notify your health care provider if you experience any of the following:  persistent dizziness, light-headedness, or visual disturbances     Medications:  Current Discharge Medication List      STOP taking these medications       aspirin (ECOTRIN) 81 MG EC tablet Comments:   Reason for Stopping:         blood sugar diagnostic Strp Comments:   Reason for Stopping:         blood sugar diagnostic Strp Comments:   Reason for Stopping:         blood-glucose meter kit Comments:   Reason for Stopping:         fluconazole (DIFLUCAN) 150 MG Tab Comments:   Reason for Stopping:         guaiFENesin (MUCINEX) 600 mg 12 hr tablet Comments:   Reason for Stopping:         lancets Misc Comments:   Reason for  Stopping:         multivitamin capsule Comments:   Reason for Stopping:         ondansetron (ZOFRAN-ODT) 8 MG TbDL Comments:   Reason for Stopping:         polyethylene glycol (GLYCOLAX) 17 gram PwPk Comments:   Reason for Stopping:         progesterone (PROMETRIUM) 200 MG capsule Comments:   Reason for Stopping:         promethazine (PHENERGAN) 25 MG tablet Comments:   Reason for Stopping:               Glen Croft CNM  Obstetrics  [facility] ([facility])

## 2023-08-20 NOTE — LACTATION NOTE
Primary nurse reports that mother is aware of Lactation availability this shift. Mother to call out for latch assistance and assessment.

## 2023-09-20 ENCOUNTER — PATIENT MESSAGE (OUTPATIENT)
Dept: OBSTETRICS AND GYNECOLOGY | Facility: CLINIC | Age: 38
End: 2023-09-20
Payer: MEDICAID

## 2023-09-22 ENCOUNTER — POSTPARTUM VISIT (OUTPATIENT)
Dept: OBSTETRICS AND GYNECOLOGY | Facility: CLINIC | Age: 38
End: 2023-09-22
Payer: MEDICAID

## 2023-09-22 VITALS
DIASTOLIC BLOOD PRESSURE: 82 MMHG | WEIGHT: 129 LBS | SYSTOLIC BLOOD PRESSURE: 128 MMHG | HEIGHT: 60 IN | BODY MASS INDEX: 25.32 KG/M2

## 2023-09-22 PROCEDURE — 99212 OFFICE O/P EST SF 10 MIN: CPT | Mod: PBBFAC,PN | Performed by: MIDWIFE

## 2023-09-22 PROCEDURE — 88141 PR  CYTOPATH CERV/VAG INTERPRET: ICD-10-PCS | Mod: ,,, | Performed by: STUDENT IN AN ORGANIZED HEALTH CARE EDUCATION/TRAINING PROGRAM

## 2023-09-22 PROCEDURE — 99999 PR PBB SHADOW E&M-EST. PATIENT-LVL II: CPT | Mod: PBBFAC,,, | Performed by: MIDWIFE

## 2023-09-22 PROCEDURE — 59430 PR CARE AFTER DELIVERY ONLY: ICD-10-PCS | Mod: TH,,, | Performed by: MIDWIFE

## 2023-09-22 PROCEDURE — 87624 HPV HI-RISK TYP POOLED RSLT: CPT | Performed by: MIDWIFE

## 2023-09-22 PROCEDURE — 88175 CYTOPATH C/V AUTO FLUID REDO: CPT | Performed by: STUDENT IN AN ORGANIZED HEALTH CARE EDUCATION/TRAINING PROGRAM

## 2023-09-22 PROCEDURE — 99999 PR PBB SHADOW E&M-EST. PATIENT-LVL II: ICD-10-PCS | Mod: PBBFAC,,, | Performed by: MIDWIFE

## 2023-09-22 PROCEDURE — 88141 CYTOPATH C/V INTERPRET: CPT | Mod: ,,, | Performed by: STUDENT IN AN ORGANIZED HEALTH CARE EDUCATION/TRAINING PROGRAM

## 2023-09-22 RX ORDER — MULTIVITAMIN
1 TABLET ORAL DAILY
COMMUNITY

## 2023-09-22 NOTE — PROGRESS NOTES
Subjective:       Ebony Rodriguez is a 38 y.o. female who presents for a postpartum visit. She is 5 weeks postpartum following a spontaneous vaginal delivery. I have fully reviewed the prenatal and intrapartum course. The delivery was at 37w4d gestational weeks. Outcome: spontaneous vaginal delivery. Anesthesia: none. Postpartum course has been uncomplicated. Baby's course has been uncomplicated. Baby is feeding by formula. Bleeding no bleeding. Bowel function is normal. Bladder function is normal. Patient is not sexually active. Contraception method is none. Postpartum depression screening: negative.    The following portions of the patient's history were reviewed and updated as appropriate: allergies, current medications, past family history, past medical history, past social history, past surgical history, and problem list.    Review of Systems  A comprehensive review of systems was negative.     Objective:      /82   Ht 5' (1.524 m)   Wt 58.5 kg (128 lb 15.5 oz)   LMP 11/28/2022 (Exact Date)   Breastfeeding No   BMI 25.19 kg/m²    General:  alert, appears stated age, and cooperative               Abdomen: soft, non-tender; bowel sounds normal; no masses,  no organomegaly    Vulva:  normal   Vagina: normal vagina, no discharge, exudate, lesion, or erythema   Cervix:  no cervical motion tenderness   Corpus: normal size, contour, position, consistency, mobility, non-tender   Adnexa:  normal adnexa   Rectal Exam: Not performed.          Assessment:      Routine postpartum exam. Pap smear done at today's visit.     Plan:      1. Contraception: tubal ligation case request placed today. Await notification for scheduling and pre-op instructions. Declines alternative contraception at this time.   2. Follow up in: 1  year for annual  or as needed.

## 2023-09-25 ENCOUNTER — PATIENT MESSAGE (OUTPATIENT)
Dept: OBSTETRICS AND GYNECOLOGY | Facility: CLINIC | Age: 38
End: 2023-09-25
Payer: MEDICAID

## 2023-09-26 DIAGNOSIS — Z30.2 ENCOUNTER FOR FEMALE STERILIZATION PROCEDURE: Primary | ICD-10-CM

## 2023-09-27 LAB
HPV HR 12 DNA SPEC QL NAA+PROBE: NEGATIVE
HPV16 AG SPEC QL: NEGATIVE
HPV18 DNA SPEC QL NAA+PROBE: NEGATIVE

## 2023-10-01 LAB
FINAL PATHOLOGIC DIAGNOSIS: NORMAL
Lab: NORMAL

## 2023-12-04 ENCOUNTER — LAB VISIT (OUTPATIENT)
Dept: LAB | Facility: HOSPITAL | Age: 38
End: 2023-12-04
Attending: PHYSICIAN ASSISTANT
Payer: MEDICAID

## 2023-12-04 ENCOUNTER — OFFICE VISIT (OUTPATIENT)
Dept: OBSTETRICS AND GYNECOLOGY | Facility: CLINIC | Age: 38
End: 2023-12-04
Payer: MEDICAID

## 2023-12-04 VITALS
WEIGHT: 127.88 LBS | SYSTOLIC BLOOD PRESSURE: 120 MMHG | HEIGHT: 60 IN | DIASTOLIC BLOOD PRESSURE: 72 MMHG | BODY MASS INDEX: 25.11 KG/M2

## 2023-12-04 DIAGNOSIS — Z30.2 ENCOUNTER FOR FEMALE STERILIZATION PROCEDURE: ICD-10-CM

## 2023-12-04 DIAGNOSIS — Z30.2 ENCOUNTER FOR STERILIZATION: Primary | ICD-10-CM

## 2023-12-04 LAB
ANION GAP SERPL CALC-SCNC: 8 MMOL/L (ref 8–16)
BUN SERPL-MCNC: 12 MG/DL (ref 6–20)
CALCIUM SERPL-MCNC: 9.3 MG/DL (ref 8.7–10.5)
CHLORIDE SERPL-SCNC: 105 MMOL/L (ref 95–110)
CO2 SERPL-SCNC: 26 MMOL/L (ref 23–29)
CREAT SERPL-MCNC: 0.7 MG/DL (ref 0.5–1.4)
EST. GFR  (NO RACE VARIABLE): >60 ML/MIN/1.73 M^2
GLUCOSE SERPL-MCNC: 92 MG/DL (ref 70–110)
POTASSIUM SERPL-SCNC: 4 MMOL/L (ref 3.5–5.1)
SODIUM SERPL-SCNC: 139 MMOL/L (ref 136–145)

## 2023-12-04 PROCEDURE — 3008F PR BODY MASS INDEX (BMI) DOCUMENTED: ICD-10-PCS | Mod: CPTII,,, | Performed by: OBSTETRICS & GYNECOLOGY

## 2023-12-04 PROCEDURE — 3044F HG A1C LEVEL LT 7.0%: CPT | Mod: CPTII,,, | Performed by: OBSTETRICS & GYNECOLOGY

## 2023-12-04 PROCEDURE — 99212 OFFICE O/P EST SF 10 MIN: CPT | Mod: PBBFAC,PN | Performed by: OBSTETRICS & GYNECOLOGY

## 2023-12-04 PROCEDURE — 3008F BODY MASS INDEX DOCD: CPT | Mod: CPTII,,, | Performed by: OBSTETRICS & GYNECOLOGY

## 2023-12-04 PROCEDURE — 3078F PR MOST RECENT DIASTOLIC BLOOD PRESSURE < 80 MM HG: ICD-10-PCS | Mod: CPTII,,, | Performed by: OBSTETRICS & GYNECOLOGY

## 2023-12-04 PROCEDURE — 3078F DIAST BP <80 MM HG: CPT | Mod: CPTII,,, | Performed by: OBSTETRICS & GYNECOLOGY

## 2023-12-04 PROCEDURE — 36415 COLL VENOUS BLD VENIPUNCTURE: CPT | Mod: PN | Performed by: PHYSICIAN ASSISTANT

## 2023-12-04 PROCEDURE — 3074F PR MOST RECENT SYSTOLIC BLOOD PRESSURE < 130 MM HG: ICD-10-PCS | Mod: CPTII,,, | Performed by: OBSTETRICS & GYNECOLOGY

## 2023-12-04 PROCEDURE — 1159F MED LIST DOCD IN RCRD: CPT | Mod: CPTII,,, | Performed by: OBSTETRICS & GYNECOLOGY

## 2023-12-04 PROCEDURE — 1159F PR MEDICATION LIST DOCUMENTED IN MEDICAL RECORD: ICD-10-PCS | Mod: CPTII,,, | Performed by: OBSTETRICS & GYNECOLOGY

## 2023-12-04 PROCEDURE — 3044F PR MOST RECENT HEMOGLOBIN A1C LEVEL <7.0%: ICD-10-PCS | Mod: CPTII,,, | Performed by: OBSTETRICS & GYNECOLOGY

## 2023-12-04 PROCEDURE — 99999 PR PBB SHADOW E&M-EST. PATIENT-LVL II: ICD-10-PCS | Mod: PBBFAC,,, | Performed by: OBSTETRICS & GYNECOLOGY

## 2023-12-04 PROCEDURE — 3074F SYST BP LT 130 MM HG: CPT | Mod: CPTII,,, | Performed by: OBSTETRICS & GYNECOLOGY

## 2023-12-04 PROCEDURE — 99999 PR PBB SHADOW E&M-EST. PATIENT-LVL II: CPT | Mod: PBBFAC,,, | Performed by: OBSTETRICS & GYNECOLOGY

## 2023-12-04 PROCEDURE — 80048 BASIC METABOLIC PNL TOTAL CA: CPT | Performed by: PHYSICIAN ASSISTANT

## 2023-12-04 PROCEDURE — 85025 COMPLETE CBC W/AUTO DIFF WBC: CPT | Performed by: PHYSICIAN ASSISTANT

## 2023-12-04 PROCEDURE — 99213 PR OFFICE/OUTPT VISIT, EST, LEVL III, 20-29 MIN: ICD-10-PCS | Mod: S$PBB,,, | Performed by: OBSTETRICS & GYNECOLOGY

## 2023-12-04 PROCEDURE — 99213 OFFICE O/P EST LOW 20 MIN: CPT | Mod: S$PBB,,, | Performed by: OBSTETRICS & GYNECOLOGY

## 2023-12-04 NOTE — H&P (VIEW-ONLY)
History & Physical    SUBJECTIVE:     History of Present Illness:  Patient is a 38 y.o. female presents with desire for sterilization.  P7;.   She adamantly does not want more children  No chief complaint on file.      Review of patient's allergies indicates:  No Known Allergies    Current Outpatient Medications   Medication Sig Dispense Refill    multivitamin (THERAGRAN) per tablet Take 1 tablet by mouth once daily.       No current facility-administered medications for this visit.       Past Medical History:   Diagnosis Date    Anemia     H/O cervical cerclage, currently pregnant     Incompetence of cervix 12/07/2016    cerlaged to be placed 7/11/18 06/18/2018--josh ordered    Incompetent cervix during second trimester, antepartum 07/11/2018    Miscarriage     PONV (postoperative nausea and vomiting)      Past Surgical History:   Procedure Laterality Date    CERVICAL CERCLAGE      CERVICAL CERCLAGE N/A 7/11/2018    Procedure: CERCLAGE, CERVIX;  Surgeon: Keila Lema MD;  Location: [facility] OR;  Service: OB/GYN;  Laterality: N/A;    CERVICAL CERCLAGE N/A 3/1/2023    Procedure: CERCLAGE, CERVIX;  Surgeon: Keila Lema MD;  Location: [facility] OR;  Service: OB/GYN;  Laterality: N/A;     Family History   Problem Relation Age of Onset    Heart failure Father     Diabetes Mother      Social History     Tobacco Use    Smoking status: Never    Smokeless tobacco: Never   Substance Use Topics    Alcohol use: Not Currently    Drug use: No        Review of Systems:  Review of Systems   All other systems reviewed and are negative.      OBJECTIVE:     Vital Signs (Most Recent)  BP: 120/72 (12/04/23 1022)  5' (1.524 m)  58 kg (127 lb 13.9 oz)     Physical Exam:  Physical Exam  Vitals reviewed.   Constitutional:       Appearance: She is well-developed.   HENT:      Head: Normocephalic.   Eyes:      Conjunctiva/sclera: Conjunctivae normal.      Pupils: Pupils are equal, round, and reactive to light.   Cardiovascular:      Rate and  Rhythm: Normal rate.   Pulmonary:      Effort: Pulmonary effort is normal.      Breath sounds: Normal breath sounds.   Abdominal:      Palpations: Abdomen is soft.   Musculoskeletal:         General: Normal range of motion.      Cervical back: Normal range of motion.   Skin:     General: Skin is warm and dry.   Neurological:      Mental Status: She is alert and oriented to person, place, and time.   Psychiatric:         Behavior: Behavior normal.         Thought Content: Thought content normal.         Judgment: Judgment normal.         Laboratory  Cbc, cmp, hcg to be drawn preop    Diagnostic Results:  N/a    ASSESSMENT/PLAN:       Encounter Diagnosis   Name Primary?    Encounter for sterilization Yes       PLAN:Plan     Reviewed laparoscopic tubal ligation procedure in detail--bilateral salpingectom (complete removal of tubes).  Reviewed risks including but not limited to infection, bleeding, damage to bowel/bladder, ureter, cva;htn, dvt, death. Pt aware procedure is permanent and cannot be reversed.  Pt aware risk of failure 3-5/1000; slightly increased risk for pregnancy in tube; pt aware needs pregnancy test if skips menses.  may see a change in menses--heavier, irregular,  All questions answered to the best of my ability.  Alternatives reviewed --condoms, ocp, mirena, depo, nuva ring, nexplanon, abstinence.  Consents signed and witnessed ; aware of surgery date and time  Post op course reviewed

## 2023-12-04 NOTE — PROGRESS NOTES
History & Physical    SUBJECTIVE:     History of Present Illness:  Patient is a 38 y.o. female presents with desire for sterilization.  P7;.   She adamantly does not want more children  No chief complaint on file.      Review of patient's allergies indicates:  No Known Allergies    Current Outpatient Medications   Medication Sig Dispense Refill    multivitamin (THERAGRAN) per tablet Take 1 tablet by mouth once daily.       No current facility-administered medications for this visit.       Past Medical History:   Diagnosis Date    Anemia     H/O cervical cerclage, currently pregnant     Incompetence of cervix 12/07/2016    cerlaged to be placed 7/11/18 06/18/2018--josh ordered    Incompetent cervix during second trimester, antepartum 07/11/2018    Miscarriage     PONV (postoperative nausea and vomiting)      Past Surgical History:   Procedure Laterality Date    CERVICAL CERCLAGE      CERVICAL CERCLAGE N/A 7/11/2018    Procedure: CERCLAGE, CERVIX;  Surgeon: Keila Lema MD;  Location: [facility] OR;  Service: OB/GYN;  Laterality: N/A;    CERVICAL CERCLAGE N/A 3/1/2023    Procedure: CERCLAGE, CERVIX;  Surgeon: Keila Lema MD;  Location: [facility] OR;  Service: OB/GYN;  Laterality: N/A;     Family History   Problem Relation Age of Onset    Heart failure Father     Diabetes Mother      Social History     Tobacco Use    Smoking status: Never    Smokeless tobacco: Never   Substance Use Topics    Alcohol use: Not Currently    Drug use: No        Review of Systems:  Review of Systems   All other systems reviewed and are negative.      OBJECTIVE:     Vital Signs (Most Recent)  BP: 120/72 (12/04/23 1022)  5' (1.524 m)  58 kg (127 lb 13.9 oz)     Physical Exam:  Physical Exam  Vitals reviewed.   Constitutional:       Appearance: She is well-developed.   HENT:      Head: Normocephalic.   Eyes:      Conjunctiva/sclera: Conjunctivae normal.      Pupils: Pupils are equal, round, and reactive to light.   Cardiovascular:      Rate and  Rhythm: Normal rate.   Pulmonary:      Effort: Pulmonary effort is normal.      Breath sounds: Normal breath sounds.   Abdominal:      Palpations: Abdomen is soft.   Musculoskeletal:         General: Normal range of motion.      Cervical back: Normal range of motion.   Skin:     General: Skin is warm and dry.   Neurological:      Mental Status: She is alert and oriented to person, place, and time.   Psychiatric:         Behavior: Behavior normal.         Thought Content: Thought content normal.         Judgment: Judgment normal.         Laboratory  Cbc, cmp, hcg to be drawn preop    Diagnostic Results:  N/a    ASSESSMENT/PLAN:       Encounter Diagnosis   Name Primary?    Encounter for sterilization Yes       PLAN:Plan     Reviewed laparoscopic tubal ligation procedure in detail--bilateral salpingectom (complete removal of tubes).  Reviewed risks including but not limited to infection, bleeding, damage to bowel/bladder, ureter, cva;htn, dvt, death. Pt aware procedure is permanent and cannot be reversed.  Pt aware risk of failure 3-5/1000; slightly increased risk for pregnancy in tube; pt aware needs pregnancy test if skips menses.  may see a change in menses--heavier, irregular,  All questions answered to the best of my ability.  Alternatives reviewed --condoms, ocp, mirena, depo, nuva ring, nexplanon, abstinence.  Consents signed and witnessed ; aware of surgery date and time  Post op course reviewed

## 2023-12-05 LAB
BASOPHILS # BLD AUTO: 0.02 K/UL (ref 0–0.2)
BASOPHILS NFR BLD: 0.4 % (ref 0–1.9)
DIFFERENTIAL METHOD: NORMAL
EOSINOPHIL # BLD AUTO: 0 K/UL (ref 0–0.5)
EOSINOPHIL NFR BLD: 0.5 % (ref 0–8)
ERYTHROCYTE [DISTWIDTH] IN BLOOD BY AUTOMATED COUNT: 12.2 % (ref 11.5–14.5)
HCT VFR BLD AUTO: 39.5 % (ref 37–48.5)
HGB BLD-MCNC: 13 G/DL (ref 12–16)
IMM GRANULOCYTES # BLD AUTO: 0.02 K/UL (ref 0–0.04)
IMM GRANULOCYTES NFR BLD AUTO: 0.4 % (ref 0–0.5)
LYMPHOCYTES # BLD AUTO: 1.4 K/UL (ref 1–4.8)
LYMPHOCYTES NFR BLD: 24.5 % (ref 18–48)
MCH RBC QN AUTO: 29.8 PG (ref 27–31)
MCHC RBC AUTO-ENTMCNC: 32.9 G/DL (ref 32–36)
MCV RBC AUTO: 91 FL (ref 82–98)
MONOCYTES # BLD AUTO: 0.4 K/UL (ref 0.3–1)
MONOCYTES NFR BLD: 7.2 % (ref 4–15)
NEUTROPHILS # BLD AUTO: 3.8 K/UL (ref 1.8–7.7)
NEUTROPHILS NFR BLD: 67 % (ref 38–73)
NRBC BLD-RTO: 0 /100 WBC
PLATELET # BLD AUTO: 189 K/UL (ref 150–450)
PMV BLD AUTO: 10.8 FL (ref 9.2–12.9)
RBC # BLD AUTO: 4.36 M/UL (ref 4–5.4)
WBC # BLD AUTO: 5.71 K/UL (ref 3.9–12.7)

## 2023-12-06 NOTE — PRE-PROCEDURE INSTRUCTIONS
Pre op instructions reviewed with patient per phone on 12/06/23: Spoke about pre op process and surgery instructions, verbalized understanding.    Surgery is scheduled on 12/13/23.  We will call you the business day prior to surgery to confirm arrival time (after 2:30 pm), as it is subject to change due to cancellations & emergencies.    Please report to the [facility] (1st Floor) at [facility] located off of [facility] (2nd building on the left, in front of the [facility]),address: [address] [place] [address] [place]. [address]    INSTRUCTIONS IMPORTANT!!!  Do Not Eat, Drink, or Smoke after 12 midnight! NO WATER after midnight! OK to brush teeth, no gum, candy or mints!    Take only these medicines with a small swallow of water-morning of surgery:  none    ____  NO Acrylic/fake nails or nail polish worn day of surgery (specifically hand/arm & foot surgeries).  ____  NO powder, lotions, deodorants, oils or creams on body.  ____  Please Remove All jewelry & piercings prior to surgery.  ____  Please Remove Dentures, Hearing Aids & Contact Lens prior to the start of surgery.  ____  Please bring photo ID and insurance information to hospital (Leave Valuables at Home).  ____  If going home the same day, arrange for a ride home. You will not be able to drive 24 hrs if Anesthesia was used.   ____  Females (ages 11-60) may need to give a urine sample the morning of surgery; please see Pre op Nurse prior to voiding.  ____  Wear clean, loose fitting clothing. Allow for dressings, bandages.  ____  Stop all Aspirin products, Ibuprofen, Advil, Motrin & Aleve at least 5-7 days before surgery, unless otherwise instructed by your doctor, or the nurse.   ____  Blood Thinners are stopped based on your Provider's recommendation; Call Surgeon's Office to inquire when to stop/hold.  ____  Stop taking any Fish Oil supplements or Vitamins at least 5 days prior to surgery, unless instructed otherwise by your Doctor.         Bathing  Instructions:    -Do not shave your face or body the day before or the day of surgery.              -Shower & Rinse your body as usual with anti-bacterial Soap (Dial), on the evening prior to surgery and the morning of surgery.               -Rinse your body thoroughly.                -Pat yourself day with a clean, soft towel.               -Do not use lotion, cream, powder or deodorant               -Dress in clean clothes     [facility] Visitor/Ride Policy:  Only 2 adults allowed (over the age of 18) to accompany you to the Hospital. You Must have a ride home from a responsible adult that you know and trust. Medical Transport, Uber or Lyft can only be used if patient has a responsible adult to accompany them during ride home.    Post-Op Instructions: You will receive Post-op/Discharge instructions by your Discharge Nurse prior to going home. Please call your Surgeon's office with any post-surgery questions/concerns.    *Call [facility] Pre-Admissions Department with surgery instruction questions @ [phone] -[place]  or [phone]  (Mon-Fri 8 am to 4 pm)    *If you are running late or have questions the morning of surgery, please call the Surgery Dept @ [phone]  *Insurance/ Financial Questions, please call [phone].

## 2023-12-12 ENCOUNTER — TELEPHONE (OUTPATIENT)
Dept: PREADMISSION TESTING | Facility: HOSPITAL | Age: 38
End: 2023-12-12
Payer: MEDICAID

## 2023-12-12 ENCOUNTER — ANESTHESIA EVENT (OUTPATIENT)
Dept: SURGERY | Facility: HOSPITAL | Age: 38
End: 2023-12-12
Payer: MEDICAID

## 2023-12-12 NOTE — ANESTHESIA PREPROCEDURE EVALUATION
12/12/2023  Ebony Rodriguez is a 38 y.o., female    Patient Active Problem List   Diagnosis    History of cervical incompetence    Single liveborn    Diet controlled gestational diabetes mellitus (GDM) in third trimester     Past Medical History:   Diagnosis Date    Anemia     H/O cervical cerclage, currently pregnant     Incompetence of cervix 12/07/2016    cerlaged to be placed 7/11/18 06/18/2018--josh ordered    Incompetent cervix during second trimester, antepartum 07/11/2018    Miscarriage     PONV (postoperative nausea and vomiting)      Past Surgical History:   Procedure Laterality Date    CERVICAL CERCLAGE      CERVICAL CERCLAGE N/A 7/11/2018    Procedure: CERCLAGE, CERVIX;  Surgeon: Keila Lema MD;  Location: [facility] OR;  Service: OB/GYN;  Laterality: N/A;    CERVICAL CERCLAGE N/A 3/1/2023    Procedure: CERCLAGE, CERVIX;  Surgeon: Keila Lema MD;  Location: [facility] OR;  Service: OB/GYN;  Laterality: N/A;       Chemistry        Component Value Date/Time     12/04/2023 1040    K 4.0 12/04/2023 1040     12/04/2023 1040    CO2 26 12/04/2023 1040    BUN 12 12/04/2023 1040    CREATININE 0.7 12/04/2023 1040    GLU 92 12/04/2023 1040        Component Value Date/Time    CALCIUM 9.3 12/04/2023 1040    ALKPHOS 36 (L) 01/05/2023 1435    AST 12 01/05/2023 1435    ALT 10 01/05/2023 1435    BILITOT 0.5 01/05/2023 1435    ESTGFRAFRICA >60 01/08/2017 1159    EGFRNONAA >60 01/08/2017 1159        Lab Results   Component Value Date    WBC 5.71 12/04/2023    HGB 13.0 12/04/2023    HCT 39.5 12/04/2023    MCV 91 12/04/2023     12/04/2023       Pre-op Assessment    I have reviewed the Patient Summary Reports.    I have reviewed the NPO Status.   I have reviewed the Medications.     Review of Systems  Anesthesia Hx:  No problems with previous Anesthesia  PONV well controlled with Zofran  History of  prior surgery of interest to airway management or planning:  Previous anesthesia: General, Spinal         Personal Hx of Anesthesia complications, Post-Operative Nausea/Vomiting, in the past, but not with recent anesthetics / prophylaxis                    Social:  Non-Smoker       Hematology/Oncology:  Hematology Normal   Oncology Normal                                   Cardiovascular:  Cardiovascular Normal                                            Pulmonary:  Pulmonary Normal                       Renal/:  Renal/ Normal                 Musculoskeletal:  Musculoskeletal Normal                OB/GYN/PEDS:  Hx of gestational diabetes           Neurological:  Neurology Normal                                      Endocrine:  Endocrine Normal    BMI 25            Physical Exam  General: Well nourished, Cooperative, Alert and Oriented    Airway:  Mallampati: III   Mouth Opening: Small, but > 3cm  TM Distance: Normal  Tongue: Normal  Neck ROM: Normal ROM    Dental:  Intact  Patient denies any currently loose or chipped teeth; Patient denies any removable dental appliances        Anesthesia Plan  Type of Anesthesia, risks & benefits discussed:    Anesthesia Type: Gen ETT  Intra-op Monitoring Plan: Standard ASA Monitors  Post Op Pain Control Plan: multimodal analgesia and IV/PO Opioids PRN  Induction:  IV  Airway Plan: Direct, Post-Induction  Informed Consent: Informed consent signed with the Patient and all parties understand the risks and agree with anesthesia plan.  All questions answered.   ASA Score: 1  Day of Surgery Review of History & Physical: H&P Update referred to the surgeon/provider.  Anesthesia Plan Notes:  Airway - Non-Surgical Placement Date: 03/01/23; Placement Time: 1007; Method of Intubation: Direct laryngoscopy; Airway Device: Endotracheal Tube; Induction type: IV - routine; Intubated: Postinduction; Style: Cuffed; Cuff Inflation: Minimal occlusive pressure; Placement Verified By: Auscultation,  Colorimetric EtCO2 device; Securment: Lips; Breath Sounds: Equal Bilateral; Insertion Attempts: 1; Removal Date: 03/01/23;  Removal Time: 1018    Ready For Surgery From Anesthesia Perspective.     .

## 2023-12-12 NOTE — TELEPHONE ENCOUNTER
Called and spoke with pt about the following:     Please arrive to [facility] ([facility] at 7 am on 12/13/23 for your scheduled procedure.  Address: [address] [place] [address] [place]. [address] (2nd Building on left, 1st Floor Lobby)  >>>NO eating or drinking after midnight unless instructed otherwise by your Surgeon<<<    Thank you,  -[facility] Pre Admit Testing Dept.  Mon-Fri 7:30 am - 4 pm [phone]

## 2023-12-13 ENCOUNTER — HOSPITAL ENCOUNTER (OUTPATIENT)
Facility: HOSPITAL | Age: 38
Discharge: HOME | End: 2023-12-13
Attending: OBSTETRICS & GYNECOLOGY | Admitting: OBSTETRICS & GYNECOLOGY
Payer: MEDICAID

## 2023-12-13 ENCOUNTER — ANESTHESIA (OUTPATIENT)
Dept: SURGERY | Facility: HOSPITAL | Age: 38
End: 2023-12-13
Payer: MEDICAID

## 2023-12-13 ENCOUNTER — PATIENT MESSAGE (OUTPATIENT)
Dept: SURGERY | Facility: HOSPITAL | Age: 38
End: 2023-12-13

## 2023-12-13 ENCOUNTER — PATIENT MESSAGE (OUTPATIENT)
Dept: OBSTETRICS AND GYNECOLOGY | Facility: CLINIC | Age: 38
End: 2023-12-13
Payer: MEDICAID

## 2023-12-13 DIAGNOSIS — Z90.79 STATUS POST BILATERAL SALPINGECTOMY: Primary | ICD-10-CM

## 2023-12-13 DIAGNOSIS — Z30.2 ENCOUNTER FOR FEMALE STERILIZATION PROCEDURE: ICD-10-CM

## 2023-12-13 LAB
B-HCG UR QL: NEGATIVE
CTP QC/QA: YES

## 2023-12-13 PROCEDURE — 58661 PR LAP,RMV  ADNEXAL STRUCTURE: ICD-10-PCS | Mod: 50,AS,, | Performed by: PHYSICIAN ASSISTANT

## 2023-12-13 PROCEDURE — 25000003 PHARM REV CODE 250: Performed by: OBSTETRICS & GYNECOLOGY

## 2023-12-13 PROCEDURE — 37000008 HC ANESTHESIA 1ST 15 MINUTES: Performed by: OBSTETRICS & GYNECOLOGY

## 2023-12-13 PROCEDURE — 88302 TISSUE EXAM BY PATHOLOGIST: CPT | Mod: 26,,, | Performed by: PATHOLOGY

## 2023-12-13 PROCEDURE — 58661 LAPAROSCOPY REMOVE ADNEXA: CPT | Mod: 50,,, | Performed by: OBSTETRICS & GYNECOLOGY

## 2023-12-13 PROCEDURE — 27201423 OPTIME MED/SURG SUP & DEVICES STERILE SUPPLY: Performed by: OBSTETRICS & GYNECOLOGY

## 2023-12-13 PROCEDURE — 36000709 HC OR TIME LEV III EA ADD 15 MIN: Performed by: OBSTETRICS & GYNECOLOGY

## 2023-12-13 PROCEDURE — 58661 LAPAROSCOPY REMOVE ADNEXA: CPT | Mod: 50,AS,, | Performed by: PHYSICIAN ASSISTANT

## 2023-12-13 PROCEDURE — 81025 URINE PREGNANCY TEST: CPT | Performed by: OBSTETRICS & GYNECOLOGY

## 2023-12-13 PROCEDURE — 37000009 HC ANESTHESIA EA ADD 15 MINS: Performed by: OBSTETRICS & GYNECOLOGY

## 2023-12-13 PROCEDURE — 88302 PR  SURG PATH,LEVEL II: ICD-10-PCS | Mod: 26,,, | Performed by: PATHOLOGY

## 2023-12-13 PROCEDURE — 71000015 HC POSTOP RECOV 1ST HR: Performed by: OBSTETRICS & GYNECOLOGY

## 2023-12-13 PROCEDURE — 63600175 PHARM REV CODE 636 W HCPCS: Performed by: NURSE ANESTHETIST, CERTIFIED REGISTERED

## 2023-12-13 PROCEDURE — 58661 PR LAP,RMV  ADNEXAL STRUCTURE: ICD-10-PCS | Mod: 50,,, | Performed by: OBSTETRICS & GYNECOLOGY

## 2023-12-13 PROCEDURE — 25000003 PHARM REV CODE 250: Performed by: NURSE ANESTHETIST, CERTIFIED REGISTERED

## 2023-12-13 PROCEDURE — 36000708 HC OR TIME LEV III 1ST 15 MIN: Performed by: OBSTETRICS & GYNECOLOGY

## 2023-12-13 PROCEDURE — 88302 TISSUE EXAM BY PATHOLOGIST: CPT | Mod: 59 | Performed by: PATHOLOGY

## 2023-12-13 PROCEDURE — 71000033 HC RECOVERY, INTIAL HOUR: Performed by: OBSTETRICS & GYNECOLOGY

## 2023-12-13 RX ORDER — HYDROCODONE BITARTRATE AND ACETAMINOPHEN 5; 325 MG/1; MG/1
1 TABLET ORAL EVERY 4 HOURS PRN
Qty: 8 TABLET | Refills: 0 | Status: SHIPPED | OUTPATIENT
Start: 2023-12-13

## 2023-12-13 RX ORDER — SUCCINYLCHOLINE CHLORIDE 20 MG/ML
INJECTION INTRAMUSCULAR; INTRAVENOUS
Status: DISCONTINUED | OUTPATIENT
Start: 2023-12-13 | End: 2023-12-13

## 2023-12-13 RX ORDER — SODIUM CHLORIDE 9 MG/ML
INJECTION, SOLUTION INTRAVENOUS CONTINUOUS
Status: DISCONTINUED | OUTPATIENT
Start: 2023-12-13 | End: 2023-12-13 | Stop reason: HOSPADM

## 2023-12-13 RX ORDER — DIPHENHYDRAMINE HYDROCHLORIDE 50 MG/ML
25 INJECTION INTRAMUSCULAR; INTRAVENOUS EVERY 4 HOURS PRN
Status: CANCELLED | OUTPATIENT
Start: 2023-12-13

## 2023-12-13 RX ORDER — PROPOFOL 10 MG/ML
VIAL (ML) INTRAVENOUS
Status: DISCONTINUED | OUTPATIENT
Start: 2023-12-13 | End: 2023-12-13

## 2023-12-13 RX ORDER — ONDANSETRON 2 MG/ML
4 INJECTION INTRAMUSCULAR; INTRAVENOUS DAILY PRN
Status: DISCONTINUED | OUTPATIENT
Start: 2023-12-13 | End: 2023-12-13 | Stop reason: HOSPADM

## 2023-12-13 RX ORDER — LIDOCAINE HYDROCHLORIDE 20 MG/ML
INJECTION INTRAVENOUS
Status: DISCONTINUED | OUTPATIENT
Start: 2023-12-13 | End: 2023-12-13

## 2023-12-13 RX ORDER — FAMOTIDINE 20 MG/1
20 TABLET, FILM COATED ORAL
Status: COMPLETED | OUTPATIENT
Start: 2023-12-13 | End: 2023-12-13

## 2023-12-13 RX ORDER — DEXAMETHASONE SODIUM PHOSPHATE 4 MG/ML
INJECTION, SOLUTION INTRA-ARTICULAR; INTRALESIONAL; INTRAMUSCULAR; INTRAVENOUS; SOFT TISSUE
Status: DISCONTINUED | OUTPATIENT
Start: 2023-12-13 | End: 2023-12-13

## 2023-12-13 RX ORDER — OXYCODONE AND ACETAMINOPHEN 5; 325 MG/1; MG/1
1 TABLET ORAL
Status: DISCONTINUED | OUTPATIENT
Start: 2023-12-13 | End: 2023-12-13 | Stop reason: HOSPADM

## 2023-12-13 RX ORDER — FENTANYL CITRATE 50 UG/ML
INJECTION, SOLUTION INTRAMUSCULAR; INTRAVENOUS
Status: DISCONTINUED | OUTPATIENT
Start: 2023-12-13 | End: 2023-12-13

## 2023-12-13 RX ORDER — ACETAMINOPHEN 500 MG
1000 TABLET ORAL
Status: COMPLETED | OUTPATIENT
Start: 2023-12-13 | End: 2023-12-13

## 2023-12-13 RX ORDER — ONDANSETRON 8 MG/1
8 TABLET, ORALLY DISINTEGRATING ORAL EVERY 8 HOURS PRN
Status: DISCONTINUED | OUTPATIENT
Start: 2023-12-13 | End: 2023-12-13 | Stop reason: HOSPADM

## 2023-12-13 RX ORDER — ROCURONIUM BROMIDE 10 MG/ML
INJECTION, SOLUTION INTRAVENOUS
Status: DISCONTINUED | OUTPATIENT
Start: 2023-12-13 | End: 2023-12-13

## 2023-12-13 RX ORDER — CHLORHEXIDINE GLUCONATE ORAL RINSE 1.2 MG/ML
10 SOLUTION DENTAL
Status: DISCONTINUED | OUTPATIENT
Start: 2023-12-13 | End: 2023-12-13 | Stop reason: HOSPADM

## 2023-12-13 RX ORDER — PROCHLORPERAZINE EDISYLATE 5 MG/ML
5 INJECTION INTRAMUSCULAR; INTRAVENOUS EVERY 6 HOURS PRN
Status: DISCONTINUED | OUTPATIENT
Start: 2023-12-13 | End: 2023-12-13 | Stop reason: HOSPADM

## 2023-12-13 RX ORDER — PROMETHAZINE HYDROCHLORIDE 12.5 MG/1
12.5 TABLET ORAL 4 TIMES DAILY
Qty: 8 TABLET | Refills: 0 | Status: SHIPPED | OUTPATIENT
Start: 2023-12-13 | End: 2023-12-15

## 2023-12-13 RX ORDER — KETAMINE HCL IN 0.9 % NACL 50 MG/5 ML
SYRINGE (ML) INTRAVENOUS
Status: DISCONTINUED | OUTPATIENT
Start: 2023-12-13 | End: 2023-12-13

## 2023-12-13 RX ORDER — IBUPROFEN 800 MG/1
800 TABLET ORAL 3 TIMES DAILY
Qty: 15 TABLET | Refills: 0 | Status: SHIPPED | OUTPATIENT
Start: 2023-12-13 | End: 2023-12-18

## 2023-12-13 RX ORDER — DIPHENHYDRAMINE HYDROCHLORIDE 50 MG/ML
12.5 INJECTION INTRAMUSCULAR; INTRAVENOUS
Status: DISCONTINUED | OUTPATIENT
Start: 2023-12-13 | End: 2023-12-13 | Stop reason: HOSPADM

## 2023-12-13 RX ORDER — HYDROMORPHONE HYDROCHLORIDE 2 MG/ML
0.2 INJECTION, SOLUTION INTRAMUSCULAR; INTRAVENOUS; SUBCUTANEOUS EVERY 5 MIN PRN
Status: DISCONTINUED | OUTPATIENT
Start: 2023-12-13 | End: 2023-12-13 | Stop reason: HOSPADM

## 2023-12-13 RX ORDER — MIDAZOLAM HYDROCHLORIDE 1 MG/ML
INJECTION INTRAMUSCULAR; INTRAVENOUS
Status: DISCONTINUED | OUTPATIENT
Start: 2023-12-13 | End: 2023-12-13

## 2023-12-13 RX ORDER — HYDROCODONE BITARTRATE AND ACETAMINOPHEN 5; 325 MG/1; MG/1
1 TABLET ORAL EVERY 4 HOURS PRN
Status: CANCELLED | OUTPATIENT
Start: 2023-12-13

## 2023-12-13 RX ORDER — ONDANSETRON 2 MG/ML
INJECTION INTRAMUSCULAR; INTRAVENOUS
Status: DISCONTINUED | OUTPATIENT
Start: 2023-12-13 | End: 2023-12-13

## 2023-12-13 RX ORDER — PHENYLEPHRINE HYDROCHLORIDE 10 MG/ML
INJECTION INTRAVENOUS
Status: DISCONTINUED | OUTPATIENT
Start: 2023-12-13 | End: 2023-12-13

## 2023-12-13 RX ORDER — KETOROLAC TROMETHAMINE 30 MG/ML
15 INJECTION, SOLUTION INTRAMUSCULAR; INTRAVENOUS EVERY 8 HOURS PRN
Status: DISCONTINUED | OUTPATIENT
Start: 2023-12-13 | End: 2023-12-13 | Stop reason: HOSPADM

## 2023-12-13 RX ADMIN — FAMOTIDINE 20 MG: 20 TABLET ORAL at 07:12

## 2023-12-13 RX ADMIN — ONDANSETRON 4 MG: 2 INJECTION INTRAMUSCULAR; INTRAVENOUS at 08:12

## 2023-12-13 RX ADMIN — ACETAMINOPHEN 1000 MG: 500 TABLET ORAL at 07:12

## 2023-12-13 RX ADMIN — PROPOFOL 125 MG: 10 INJECTION, EMULSION INTRAVENOUS at 08:12

## 2023-12-13 RX ADMIN — SUGAMMADEX 200 MG: 100 INJECTION, SOLUTION INTRAVENOUS at 08:12

## 2023-12-13 RX ADMIN — FENTANYL CITRATE 100 MCG: 50 INJECTION, SOLUTION INTRAMUSCULAR; INTRAVENOUS at 08:12

## 2023-12-13 RX ADMIN — MIDAZOLAM HYDROCHLORIDE 2 MG: 1 INJECTION, SOLUTION INTRAMUSCULAR; INTRAVENOUS at 08:12

## 2023-12-13 RX ADMIN — CHLORHEXIDINE GLUCONATE 0.12% ORAL RINSE 10 ML: 1.2 LIQUID ORAL at 07:12

## 2023-12-13 RX ADMIN — DEXAMETHASONE SODIUM PHOSPHATE 4 MG: 4 INJECTION, SOLUTION INTRA-ARTICULAR; INTRALESIONAL; INTRAMUSCULAR; INTRAVENOUS; SOFT TISSUE at 08:12

## 2023-12-13 RX ADMIN — PHENYLEPHRINE HYDROCHLORIDE 200 MCG: 10 INJECTION INTRAVENOUS at 08:12

## 2023-12-13 RX ADMIN — GLYCOPYRROLATE 0.2 MG: 0.2 INJECTION, SOLUTION INTRAMUSCULAR; INTRAVENOUS at 08:12

## 2023-12-13 RX ADMIN — LIDOCAINE HYDROCHLORIDE 75 MG: 20 INJECTION INTRAVENOUS at 08:12

## 2023-12-13 RX ADMIN — ROCURONIUM BROMIDE 5 MG: 10 SOLUTION INTRAVENOUS at 08:12

## 2023-12-13 RX ADMIN — Medication 25 MG: at 08:12

## 2023-12-13 RX ADMIN — ROCURONIUM BROMIDE 20 MG: 10 SOLUTION INTRAVENOUS at 08:12

## 2023-12-13 RX ADMIN — SUCCINYLCHOLINE CHLORIDE 100 MG: 20 INJECTION, SOLUTION INTRAMUSCULAR; INTRAVENOUS; PARENTERAL at 08:12

## 2023-12-13 NOTE — OP NOTE
[facility] - Surgery ([facility])  General Surgery  Operative Note    SUMMARY     Date of Procedure: 12/13/2023     Procedure: Procedure(s) (LRB):  SALPINGECTOMY, LAPAROSCOPIC (Bilateral)       Surgeon(s) and Role:     * Keila Lema MD - Primary    Assistant: ALIREZA Valdez (essential for care of patient)     Pre-Operative Diagnosis: Encounter for female sterilization procedure [Z30.2]    Post-Operative Diagnosis: Post-Op Diagnosis Codes:     * Encounter for female sterilization procedure [Z30.2]    Anesthesia: General    Operative Findings (including complications, if any): normal appearing uterus, cervix, ovaries    Description of Technical Procedures: laparoscopic bilateral salpingectomy    The patient was taken to the Operating Room where GETA was induced and found to be adequate. She was then placed in the dorsal lithotomy position with her legs in the Robles stirrups and her arms tucked at her sides.  Her perineum was prepped and draped in the normal, sterile fashion, and a Montes catheter was placed in her bladder and hung to gravity.  A Zumi intrauterine manipulator was then placed in the usual fashion.  Her abdomen was then prepped and draped in the normal, sterile fashion, and her legs were placed in the low lithotomy position.  Time out was performed.  The periumbilical skin was tented with perforating towel clamps, and a Verress needle was inserted through the umbilicus into the intraperitoneal cavity.  Intraperitoneal placement was confirmed with a water drop test, and pneumoperitoneum was achieved with Carbon Dioxide gas up to a pressure of 15 mmHg.  The verress needle was removed, and a 5 mm skin incision was made in the umbilicus. A 5 mm trocar was inserted through this incision, and the scope was inserted through this trocar.  Immediate inspection of underlying organs revealed no damage or injury.  She was then placed in Trendelenburg position.  The pelvic organs were examined, and the findings  stated above were noted.  A 8mm accessory trocar was placed in the left lower quadrant, and a 5mm trocar was placed in the right lower quadrant.   The left fallopian tube was then excised working from the fimbriated end to the cornual portion of the tube using the harmonic scalpel.  The left fallopian tube was then removed and sent to pathology for permanent section.  This same procedure was repeated on the right fallopian tube.  All surgical sites were then examined and noted to be hemostatic.    All instruments were removed from the trocars and pneumoperitoneum was allowed to escape.  The patient was taken out of Trendelenburg position, and all trocars were removed.  Hemostasis at all skin sites was achieved with the Bovie cautery.  All skin sites were closed with 4-0 monoccryl in a running, subcuticular fashion.  All instruments were removed from the vagina.  Sponge, laparotomy sponge, and needle counts were correct.  She will go to recovery in stable condition.  Significant Surgical Tasks Conducted by the Assistant(s), if Applicable: 1st assist    Estimated Blood Loss (EBL): * No values recorded between 12/13/2023  8:40 AM and 12/13/2023  9:04 AM *5cc           Implants: * No implants in log *    Specimens:   Specimen (24h ago, onward)       Start     Ordered    12/13/23 0850  Specimen to Pathology, Surgery Gynecology and Obstetrics  Once        Comments: Pre-op Diagnosis: Encounter for female sterilization procedure [Z30.2]Procedure(s):SALPINGECTOMY, LAPAROSCOPIC Number of specimens: 2Name of specimens: 1. Left tube-perm  2. Right tube-perm     References:    Click here for ordering Quick Tip   Question Answer Comment   Procedure Type: Gynecology and Obstetrics    Specimen Class: Routine/Screening    Which provider would you like to cc? OTILIO ARIAS    Release to patient Immediate        12/13/23 0832                            Condition: Good    Disposition: PACU - hemodynamically stable.    Attestation: I  performed the procedure.

## 2023-12-13 NOTE — ANESTHESIA POSTPROCEDURE EVALUATION
Anesthesia Post Evaluation    Patient: Ebony Rodriguez    Procedure(s) Performed: Procedure(s) (LRB):  SALPINGECTOMY, LAPAROSCOPIC (Bilateral)    Final Anesthesia Type: general      Patient location during evaluation: PACU  Patient participation: Yes- Able to Participate  Level of consciousness: awake and oriented  Post-procedure vital signs: reviewed and stable  Pain management: adequate  Airway patency: patent    PONV status at discharge: No PONV  Anesthetic complications: no      Cardiovascular status: blood pressure returned to baseline and hemodynamically stable  Respiratory status: spontaneous ventilation, room air and unassisted                Vitals Value Taken Time   /58 12/13/23 0910   Temp 97 12/13/23 0910   Pulse 87 12/13/23 0909   Resp 16 12/13/23 0909   SpO2 98 % 12/13/23 0909   Vitals shown include unvalidated device data.      No case tracking events are documented in the log.      Pain/Simon Score: Pain Rating Prior to Med Admin: 0 (12/13/2023  7:23 AM)

## 2023-12-13 NOTE — INTERVAL H&P NOTE
The patient has been examined and the H&P has been reviewed:    I concur with the findings and no changes have occurred since H&P was written.  She is ready to proceed with bilateral salpingectomy.  She adamantly does not want more children  Post op course reviewed    Upt neg  Hgb 13    Surgery risks, benefits and alternative options discussed and understood by patient/family.          There are no hospital problems to display for this patient.

## 2023-12-13 NOTE — BRIEF OP NOTE
[facility] - Surgery (Hospital)  Brief Operative Note    Surgery Date: 12/13/2023     Surgeon(s) and Role:     * Otilio Escobedo MD - Primary    Assistant:  ALIREZA Valdez (essential for care of patient)     Pre-op Diagnosis:  Encounter for female sterilization procedure [Z30.2]    Post-op Diagnosis:  Post-Op Diagnosis Codes:     * Encounter for female sterilization procedure [Z30.2]    Procedure(s) (LRB):  SALPINGECTOMY, LAPAROSCOPIC (Bilateral)    Anesthesia: General    Operative Findings: normal appearing uterus, cervix, ovaries    Estimated Blood Loss: * No values recorded between 12/13/2023  8:40 AM and 12/13/2023  9:05 AM *5cc         Specimens:   Specimen (24h ago, onward)       Start     Ordered    12/13/23 0850  Specimen to Pathology, Surgery Gynecology and Obstetrics  Once        Comments: Pre-op Diagnosis: Encounter for female sterilization procedure [Z30.2]Procedure(s):SALPINGECTOMY, LAPAROSCOPIC Number of specimens: 2Name of specimens: 1. Left tube-perm  2. Right tube-perm     References:    Click here for ordering Quick Tip   Question Answer Comment   Procedure Type: Gynecology and Obstetrics    Specimen Class: Routine/Screening    Which provider would you like to cc? OTILIO ESCOBEDO    Release to patient Immediate        12/13/23 0852                      Discharge Note    OUTCOME: Patient tolerated treatment/procedure well without complication and is now ready for discharge.    DISPOSITION: Home or Self Care    FINAL DIAGNOSIS:  Status post bilateral salpingectomy    FOLLOWUP: In clinic, dr escobedo 2 wks prn (post op check)     DISCHARGE INSTRUCTIONS:    Discharge Procedure Orders   Diet general     Pelvic Rest   Order Comments: X 2 wks --no tampons , intercourse, douching     No dressing needed     Call MD for:  temperature >100.4     Call MD for:  persistent nausea and vomiting     Call MD for:  severe uncontrolled pain     Call MD for:  difficulty breathing, headache or visual disturbances      Lifting restrictions   Order Comments: No lifting over 10-15lbs for 2 wks

## 2023-12-14 VITALS
RESPIRATION RATE: 16 BRPM | WEIGHT: 129 LBS | BODY MASS INDEX: 25.32 KG/M2 | SYSTOLIC BLOOD PRESSURE: 112 MMHG | OXYGEN SATURATION: 100 % | HEIGHT: 60 IN | TEMPERATURE: 98 F | DIASTOLIC BLOOD PRESSURE: 66 MMHG | HEART RATE: 80 BPM

## 2023-12-15 LAB
FINAL PATHOLOGIC DIAGNOSIS: NORMAL
GROSS: NORMAL
Lab: NORMAL

## 2023-12-18 ENCOUNTER — PATIENT MESSAGE (OUTPATIENT)
Dept: OBSTETRICS AND GYNECOLOGY | Facility: CLINIC | Age: 38
End: 2023-12-18
Payer: MEDICAID

## 2024-01-09 ENCOUNTER — PATIENT MESSAGE (OUTPATIENT)
Dept: OBSTETRICS AND GYNECOLOGY | Facility: CLINIC | Age: 39
End: 2024-01-09
Payer: MEDICAID

## 2024-01-11 ENCOUNTER — OFFICE VISIT (OUTPATIENT)
Dept: OBSTETRICS AND GYNECOLOGY | Facility: CLINIC | Age: 39
End: 2024-01-11
Payer: MEDICAID

## 2024-01-11 VITALS
BODY MASS INDEX: 24.63 KG/M2 | HEIGHT: 60 IN | DIASTOLIC BLOOD PRESSURE: 76 MMHG | WEIGHT: 125.44 LBS | SYSTOLIC BLOOD PRESSURE: 114 MMHG

## 2024-01-11 DIAGNOSIS — Z90.79 STATUS POST BILATERAL SALPINGECTOMY: Primary | ICD-10-CM

## 2024-01-11 PROCEDURE — 3078F DIAST BP <80 MM HG: CPT | Mod: CPTII,,, | Performed by: OBSTETRICS & GYNECOLOGY

## 2024-01-11 PROCEDURE — 99212 OFFICE O/P EST SF 10 MIN: CPT | Mod: PBBFAC,PN | Performed by: OBSTETRICS & GYNECOLOGY

## 2024-01-11 PROCEDURE — 99024 POSTOP FOLLOW-UP VISIT: CPT | Mod: ,,, | Performed by: OBSTETRICS & GYNECOLOGY

## 2024-01-11 PROCEDURE — 3074F SYST BP LT 130 MM HG: CPT | Mod: CPTII,,, | Performed by: OBSTETRICS & GYNECOLOGY

## 2024-01-11 PROCEDURE — 1159F MED LIST DOCD IN RCRD: CPT | Mod: CPTII,,, | Performed by: OBSTETRICS & GYNECOLOGY

## 2024-01-11 PROCEDURE — 99999 PR PBB SHADOW E&M-EST. PATIENT-LVL II: CPT | Mod: PBBFAC,,, | Performed by: OBSTETRICS & GYNECOLOGY

## 2024-01-11 NOTE — PROGRESS NOTES
Subjective:       Patient ID: Ebony Rodriguez is a 38 y.o. female.    Chief Complaint:  No chief complaint on file.      History of Present Illness  HPI  Postoperative Follow-up  Patient presents to the clinic 4 weeks status post laparoscopic tubal ligation for requested sterilization. Eating a regular diet without difficulty. Bowel movements are normal. The patient is not having any pain.  Pt c/o irritation around incision  GYN & OB History  Patient's last menstrual period was 01/10/2024 (exact date).   Date of Last Pap: 10/1/2023    OB History    Para Term  AB Living   9 7 5 2 2 7   SAB IAB Ectopic Multiple Live Births   2 0 0 0 7      # Outcome Date GA Lbr Andres/2nd Weight Sex Delivery Anes PTL Lv   9 Term 23 37w4d / 00:26 2.56 kg (5 lb 10.3 oz) F Vag-Spont None N KOFI   8 Term 19 39w0d / 00:19 2.693 kg (5 lb 15 oz) M Vag-Spont None N KOFI   7 Term 17 39w5d  3.035 kg (6 lb 11.1 oz) M Vag-Spont None N KOFI   6 Term 14 38w0d   M Vag-Spont   KOFI      Complications: Cervical cerclage suture present   5 Term 13 38w0d   M Vag-Spont   KOFI      Complications: Cervical cerclage suture present   4 SAB 12 20w0d   F SAB   ND   3  10/31/09 36w0d   F Vag-Spont   KOFI      Complications: Cervical cerclage suture present   2  05 36w0d   F Vag-Spont   KOFI      Complications: Cervical cerclage suture present   1 SAB 00 20w0d   M    ND       Review of Systems  Review of Systems   All other systems reviewed and are negative.          Objective:      Physical Exam:   Constitutional: She is oriented to person, place, and time. She appears well-developed.     Eyes: Pupils are equal, round, and reactive to light. Conjunctivae and EOM are normal.      Pulmonary/Chest: Effort normal.        Abdominal: Soft. She exhibits abdominal incision (well healed x 3).             Musculoskeletal: Normal range of motion.       Neurological: She is alert and oriented to person,  place, and time.    Skin: Skin is warm.    Psychiatric: She has a normal mood and affect.           Assessment:        1. Status post bilateral salpingectomy               Plan:   Released from gyn care  Continue to keep incision clean/dry  Ww exam due after 9/2024

## (undated) DEVICE — CATH 16FR URETHRL RED RUB

## (undated) DEVICE — SUT MERSILENE 5-0 RS22

## (undated) DEVICE — MANIPULATOR UTERINE

## (undated) DEVICE — COVER OVERHEAD SURG LT BLUE

## (undated) DEVICE — IRRIGATOR ENDOSCOPY DISP.

## (undated) DEVICE — MANIFOLD 4 PORT

## (undated) DEVICE — PACK BASIC SETUP SC BR

## (undated) DEVICE — SYR 10CC LUER LOCK

## (undated) DEVICE — SEE MEDLINE ITEM 157027

## (undated) DEVICE — PACK DRAPE PERI/GYN TIBURON

## (undated) DEVICE — TUBING MEDI-VAC 20FT .25IN

## (undated) DEVICE — GLOVE SENSICARE PI GRN 7

## (undated) DEVICE — SOL NS 1000CC

## (undated) DEVICE — COVER LIGHT HANDLE 80/CA

## (undated) DEVICE — SHEARS HARMONIC 5CM 36CM

## (undated) DEVICE — SYR 3CC LUER LOC

## (undated) DEVICE — SEE MEDLINE ITEM 157181

## (undated) DEVICE — DRAPE UINDERBUT GRAD PCH

## (undated) DEVICE — JELLY SURGILUBE LUBE PKT 3GM

## (undated) DEVICE — APPLICATOR CHLORAPREP ORN 26ML

## (undated) DEVICE — CONTAINER SPECIMEN OR STER 4OZ

## (undated) DEVICE — ELECTRODE REM PLYHSV RETURN 9

## (undated) DEVICE — GOWN POLY REINF BRTH SLV XL

## (undated) DEVICE — COVER PROXIMA MAYO STAND

## (undated) DEVICE — TROCAR ENDOPATH XCEL 5X100MM

## (undated) DEVICE — SUT MONOCRYL 4.0 PS2 CP496G

## (undated) DEVICE — TROCAR ENDOPATH XCEL 8MM 10CM

## (undated) DEVICE — CONTAINER SPECIMEN STRL 4OZ

## (undated) DEVICE — SOL ELECTROLUBE ANTI-STIC

## (undated) DEVICE — SUT CHROMIC 3-0 SH 27IN GUT

## (undated) DEVICE — GLOVE SURG STRL SZ 6.5

## (undated) DEVICE — NDL PNEUMO INSUFFLATI 120MM

## (undated) DEVICE — Device

## (undated) DEVICE — SET PNEUMOCLEAR HEAT HUM SE HF

## (undated) DEVICE — SEE MEDLINE ITEM 152739

## (undated) DEVICE — TRAY SKIN SCRUB WET 4 COMPART

## (undated) DEVICE — GLOVE SURG BIOGEL LATEX SZ 7.5

## (undated) DEVICE — CANNULA ENDOPATH XCEL 5X100MM

## (undated) DEVICE — ADHESIVE DERMABOND ADVANCED

## (undated) DEVICE — TOWEL OR DISP STRL BLUE 4/PK

## (undated) DEVICE — UNDERGLOVES BIOGEL PI SZ 7 LF

## (undated) DEVICE — GLOVE PROTEXIS HYDROGEL SZ7.5

## (undated) DEVICE — GLOVE 7.0 PROTEXIS PI BLUE

## (undated) DEVICE — GLOVE SIGNATURE ESSNTL LTX 7

## (undated) DEVICE — DRAPE LAVH SURG 109X109X100IN

## (undated) DEVICE — SEE MEDLINE ITEM 152622

## (undated) DEVICE — TRAY CATH FOL SIL URIMTR 16FR

## (undated) DEVICE — SEE L#152161

## (undated) DEVICE — SOL NORMAL USPCA 0.9%

## (undated) DEVICE — KIT ANTIFOG W/SPONG & FLUID